# Patient Record
Sex: FEMALE | Race: WHITE | NOT HISPANIC OR LATINO | Employment: OTHER | URBAN - METROPOLITAN AREA
[De-identification: names, ages, dates, MRNs, and addresses within clinical notes are randomized per-mention and may not be internally consistent; named-entity substitution may affect disease eponyms.]

---

## 2018-01-09 ENCOUNTER — OFFICE VISIT (OUTPATIENT)
Dept: INTERNAL MEDICINE | Facility: CLINIC | Age: 58
End: 2018-01-09
Payer: COMMERCIAL

## 2018-01-09 VITALS
BODY MASS INDEX: 30.05 KG/M2 | TEMPERATURE: 98.4 F | HEART RATE: 99 BPM | OXYGEN SATURATION: 97 % | SYSTOLIC BLOOD PRESSURE: 90 MMHG | DIASTOLIC BLOOD PRESSURE: 60 MMHG | WEIGHT: 187 LBS | HEIGHT: 66 IN

## 2018-01-09 DIAGNOSIS — Z13.6 CARDIOVASCULAR SCREENING; LDL GOAL LESS THAN 130: ICD-10-CM

## 2018-01-09 DIAGNOSIS — H61.21 IMPACTED CERUMEN OF RIGHT EAR: ICD-10-CM

## 2018-01-09 DIAGNOSIS — Z12.31 ENCOUNTER FOR SCREENING MAMMOGRAM FOR BREAST CANCER: ICD-10-CM

## 2018-01-09 DIAGNOSIS — I10 ESSENTIAL HYPERTENSION, BENIGN: Primary | ICD-10-CM

## 2018-01-09 PROCEDURE — 99202 OFFICE O/P NEW SF 15 MIN: CPT | Mod: 25 | Performed by: NURSE PRACTITIONER

## 2018-01-09 PROCEDURE — 69209 REMOVE IMPACTED EAR WAX UNI: CPT | Mod: RT | Performed by: NURSE PRACTITIONER

## 2018-01-09 RX ORDER — LISINOPRIL AND HYDROCHLOROTHIAZIDE 20; 25 MG/1; MG/1
1 TABLET ORAL DAILY
COMMUNITY
End: 2018-01-19

## 2018-01-09 RX ORDER — HYDROCHLOROTHIAZIDE 25 MG/1
25 TABLET ORAL DAILY
Qty: 30 TABLET | Refills: 1 | Status: SHIPPED | OUTPATIENT
Start: 2018-01-09 | End: 2018-03-07

## 2018-01-09 NOTE — PATIENT INSTRUCTIONS
Hydrochlorothiazide 25 mg daily     Follow blood pressure - goal is less than 140/90    Mammogram   To schedule your mammogram, you may call the breast center at [phone].

## 2018-01-09 NOTE — NURSING NOTE
"Chief Complaint   Patient presents with     Establish Care     New pt ,pt wanting to review medications, needs refill on BP med       Initial BP 90/60 (BP Location: Left arm, Patient Position: Chair, Cuff Size: Adult Large)  Pulse 99  Temp 98.4  F (36.9  C) (Oral)  Ht 5' 6\" (1.676 m)  Wt 187 lb (84.8 kg)  SpO2 97%  BMI 30.18 kg/m2 Estimated body mass index is 30.18 kg/(m^2) as calculated from the following:    Height as of this encounter: 5' 6\" (1.676 m).    Weight as of this encounter: 187 lb (84.8 kg).  Medication Reconciliation: complete      "

## 2018-01-09 NOTE — MR AVS SNAPSHOT
After Visit Summary   1/9/2018    Lani Walker    MRN: [id number]           Patient Information     Date Of Birth          1960        Visit Information        Provider Department      1/9/2018 2:40 PM Francine Lombardi APRN CNP [facility] [address] [facility]        Today's Diagnoses     Essential hypertension, benign    -  1    Encounter for screening mammogram for breast cancer          Care Instructions    Hydrochlorothiazide 25 mg daily     Follow blood pressure - goal is less than 140/90    Mammogram   To schedule your mammogram, you may call the breast center at [phone].             Follow-ups after your visit        Your next 10 appointments already scheduled     Jan 16, 2018  8:20 AM CST   PHYSICAL with MEHREEN Puckett CNP   [facility] [address] ([facility])    [address]  [facility] [id number] [phone]              Future tests that were ordered for you today     Open Future Orders        Priority Expected Expires Ordered    *MA Screening Digital Bilateral Routine  1/9/2019 1/9/2018            Who to contact     If you have questions or need follow up information about today's clinic visit or your schedule please contact [facility] directly at [phone].  Normal or non-critical lab and imaging results will be communicated to you by MyChart, letter or phone within 4 business days after the clinic has received the results. If you do not hear from us within 7 days, please contact the clinic through MyChart or phone. If you have a critical or abnormal lab result, we will notify you by phone as soon as possible.  Submit refill requests through [employer] or call your pharmacy and they will forward the refill request to us. Please allow 3 business days for your refill to be completed.          Additional Information About Your Visit        [employer] Information     [employer] lets you send messages to your doctor, view  "your test results, renew your prescriptions, schedule appointments and more. To sign up, go to [email or web address] . Click on \"Log in\" on the left side of the screen, which will take you to the Welcome page. Then click on \"Sign up Now\" on the right side of the page.     You will be asked to enter the access code listed below, as well as some personal information. Please follow the directions to create your username and password.     Your access code is: [email or web address]  Expires: 2018  3:12 PM     Your access code will  in 90 days. If you need help or a new code, please call your [facility] clinic or [phone].        Care EveryWhere ID     This is your Care EveryWhere ID. This could be used by other organizations to access your [facility] medical records  [id number]        Your Vitals Were     Pulse Temperature Height Pulse Oximetry BMI (Body Mass Index)       99 98.4  F (36.9  C) (Oral) 5' 6\" (1.676 m) 97% 30.18 kg/m2        Blood Pressure from Last 3 Encounters:   18 90/60    Weight from Last 3 Encounters:   18 187 lb (84.8 kg)                 Today's Medication Changes          These changes are accurate as of: 18  3:12 PM.  If you have any questions, ask your nurse or doctor.               Start taking these medicines.        Dose/Directions    hydrochlorothiazide 25 MG tablet   Commonly known as:  HYDRODIURIL   Used for:  Essential hypertension, benign   Started by:  Francine Lombardi APRN CNP        Dose:  25 mg   Take 1 tablet (25 mg) by mouth daily   Quantity:  30 tablet   Refills:  1            Where to get your medicines      These medications were sent to [id number] [address] [address]     Phone:  [phone]     hydrochlorothiazide 25 MG tablet                Primary Care Provider    None Specified       No primary provider on file.        Equal Access to Services     [facility] SHAI AH: Haleyii elisha kraft " loren Aguilar, maryamda lubjadaha, qaybta kamaude sadler, charanjit alfonsoin hayaan azaliakairne ernestoamanda laleahadelaida familia. So [facility] [phone].    ATENCIÓN: Si habla español, tiene a biggs disposición servicios gratuitos de asistencia lingüística. Karine al [phone].    We comply with applicable federal civil rights laws and [place] laws. We do not discriminate on the basis of race, color, national origin, age, disability, sex, sexual orientation, or gender identity.            Thank you!     Thank you for choosing [facility]  for your care. Our goal is always to provide you with excellent care. Hearing back from our patients is one way we can continue to improve our services. Please take a few minutes to complete the written survey that you may receive in the mail after your visit with us. Thank you!             Your Updated Medication List - Protect others around you: Learn how to safely use, store and throw away your medicines at www.disposemymeds.org.          This list is accurate as of: 1/9/18  3:12 PM.  Always use your most recent med list.                   Brand Name Dispense Instructions for use Diagnosis    hydrochlorothiazide 25 MG tablet    HYDRODIURIL    30 tablet    Take 1 tablet (25 mg) by mouth daily    Essential hypertension, benign       lisinopril-hydrochlorothiazide 20-25 MG per tablet    PRINZIDE/ZESTORETIC     Take 1 tablet by mouth daily

## 2018-01-09 NOTE — PROGRESS NOTES
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  Additional history: as documented    Patient Active Problem List   Diagnosis     Essential hypertension, benign     Past Surgical History:   Procedure Laterality Date     BREAST BIOPSY, RT/LT  2016    left benign        Social History   Substance Use Topics     Smoking status: Current Every Day Smoker     Smokeless tobacco: Never Used      Comment: 10 cigs per day     Alcohol use Yes      Comment: once weekly 4-5 drinks      Family History   Problem Relation Age of Onset     Alzheimer Disease Mother      Heart Surgery Father      Alzheimer Disease Sister              Reviewed and updated as needed this visit by clinical staffTobacco  Allergies  Meds  Med Hx  Surg Hx  Fam Hx  Soc Hx      Reviewed and updated as needed this visit by Provider  Allergies         ROS:  Constitutional, HEENT, cardiovascular, pulmonary, GI, , musculoskeletal, neuro, skin, endocrine and psych systems are negative, except as otherwise noted.      OBJECTIVE:   BP 90/60 (BP Location: Left arm, Patient Position: Chair, Cuff Size: Adult Large)  Pulse 99  Temp 98.4  F (36.9  C) (Oral)  Ht 5' 6\" (1.676 m)  Wt 187 lb (84.8 kg)  SpO2 97%  BMI 30.18 kg/m2  Body mass index is 30.18 kg/(m^2).  GENERAL: healthy, alert and no distress  HENT: ear canals and TM's normal, " nose and mouth without ulcers or lesions  RESP: lungs clear to auscultation - no rales, rhonchi or wheezes  CV: regular rate and rhythm, normal S1 S2, no S3 or S4, no murmur, click or rub, no peripheral edema and peripheral pulses strong  MS: no gross musculoskeletal defects noted, no edema  NEURO: Normal strength and tone, mentation intact and speech normal  PSYCH: mentation appears normal, affect normal/bright    Diagnostic Test Results:  none     ASSESSMENT/PLAN:             1. Essential hypertension, benign  Too low - will decrease medication   Follow up next week for physical    - hydrochlorothiazide (HYDRODIURIL) 25 MG tablet; Take 1 tablet (25 mg) by mouth daily  Dispense: 30 tablet; Refill: 1    2. Encounter for screening mammogram for breast cancer  Benign biopsy 2016 left breast   - *MA Screening Digital Bilateral; Future    3. Impacted cerumen of right ear  Ear wash     4. CARDIOVASCULAR SCREENING; LDL GOAL LESS THAN 130  Physical next week       Patient Instructions   Hydrochlorothiazide 25 mg daily     Follow blood pressure - goal is less than 140/90    Mammogram   To schedule your mammogram, you may call the breast center at [phone].         MEHREEN Puckett [facility]

## 2018-01-16 ENCOUNTER — HOSPITAL ENCOUNTER (OUTPATIENT)
Dept: MAMMOGRAPHY | Facility: CLINIC | Age: 58
Discharge: HOME | End: 2018-01-16
Attending: NURSE PRACTITIONER | Admitting: NURSE PRACTITIONER
Payer: COMMERCIAL

## 2018-01-16 DIAGNOSIS — Z12.31 ENCOUNTER FOR SCREENING MAMMOGRAM FOR BREAST CANCER: ICD-10-CM

## 2018-01-16 PROCEDURE — 77067 SCR MAMMO BI INCL CAD: CPT

## 2018-01-19 ENCOUNTER — OFFICE VISIT (OUTPATIENT)
Dept: INTERNAL MEDICINE | Facility: CLINIC | Age: 58
End: 2018-01-19
Payer: COMMERCIAL

## 2018-01-19 VITALS
TEMPERATURE: 98.5 F | SYSTOLIC BLOOD PRESSURE: 108 MMHG | HEART RATE: 89 BPM | OXYGEN SATURATION: 97 % | DIASTOLIC BLOOD PRESSURE: 68 MMHG | HEIGHT: 66 IN | WEIGHT: 185 LBS | BODY MASS INDEX: 29.73 KG/M2

## 2018-01-19 DIAGNOSIS — Z00.01 ENCOUNTER FOR GENERAL ADULT MEDICAL EXAMINATION WITH ABNORMAL FINDINGS: Primary | ICD-10-CM

## 2018-01-19 DIAGNOSIS — Z11.59 NEED FOR HEPATITIS C SCREENING TEST: ICD-10-CM

## 2018-01-19 DIAGNOSIS — Z23 NEED FOR PROPHYLACTIC VACCINATION AND INOCULATION AGAINST INFLUENZA: ICD-10-CM

## 2018-01-19 DIAGNOSIS — I10 ESSENTIAL HYPERTENSION, BENIGN: ICD-10-CM

## 2018-01-19 DIAGNOSIS — Z71.6 ENCOUNTER FOR SMOKING CESSATION COUNSELING: ICD-10-CM

## 2018-01-19 DIAGNOSIS — Z72.0 TOBACCO ABUSE: ICD-10-CM

## 2018-01-19 DIAGNOSIS — Z13.6 CARDIOVASCULAR SCREENING; LDL GOAL LESS THAN 130: ICD-10-CM

## 2018-01-19 LAB
BASOPHILS # BLD AUTO: 0 10E9/L (ref 0–0.2)
BASOPHILS NFR BLD AUTO: 0.2 %
DIFFERENTIAL METHOD BLD: NORMAL
EOSINOPHIL # BLD AUTO: 0.1 10E9/L (ref 0–0.7)
EOSINOPHIL NFR BLD AUTO: 0.8 %
ERYTHROCYTE [DISTWIDTH] IN BLOOD BY AUTOMATED COUNT: 12.6 % (ref 10–15)
HCT VFR BLD AUTO: 38.2 % (ref 35–47)
HGB BLD-MCNC: 12.8 G/DL (ref 11.7–15.7)
LYMPHOCYTES # BLD AUTO: 2 10E9/L (ref 0.8–5.3)
LYMPHOCYTES NFR BLD AUTO: 33.7 %
MCH RBC QN AUTO: 32.4 PG (ref 26.5–33)
MCHC RBC AUTO-ENTMCNC: 33.5 G/DL (ref 31.5–36.5)
MCV RBC AUTO: 97 FL (ref 78–100)
MONOCYTES # BLD AUTO: 0.6 10E9/L (ref 0–1.3)
MONOCYTES NFR BLD AUTO: 9.6 %
NEUTROPHILS # BLD AUTO: 3.3 10E9/L (ref 1.6–8.3)
NEUTROPHILS NFR BLD AUTO: 55.7 %
PLATELET # BLD AUTO: 305 10E9/L (ref 150–450)
RBC # BLD AUTO: 3.95 10E12/L (ref 3.8–5.2)
WBC # BLD AUTO: 5.9 10E9/L (ref 4–11)

## 2018-01-19 PROCEDURE — 36415 COLL VENOUS BLD VENIPUNCTURE: CPT | Performed by: NURSE PRACTITIONER

## 2018-01-19 PROCEDURE — 99396 PREV VISIT EST AGE 40-64: CPT | Mod: 25 | Performed by: NURSE PRACTITIONER

## 2018-01-19 PROCEDURE — 90686 IIV4 VACC NO PRSV 0.5 ML IM: CPT | Performed by: NURSE PRACTITIONER

## 2018-01-19 PROCEDURE — 84439 ASSAY OF FREE THYROXINE: CPT | Performed by: NURSE PRACTITIONER

## 2018-01-19 PROCEDURE — 86803 HEPATITIS C AB TEST: CPT | Performed by: NURSE PRACTITIONER

## 2018-01-19 PROCEDURE — 80061 LIPID PANEL: CPT | Performed by: NURSE PRACTITIONER

## 2018-01-19 PROCEDURE — 90471 IMMUNIZATION ADMIN: CPT | Performed by: NURSE PRACTITIONER

## 2018-01-19 PROCEDURE — 80050 GENERAL HEALTH PANEL: CPT | Performed by: NURSE PRACTITIONER

## 2018-01-19 PROCEDURE — 82306 VITAMIN D 25 HYDROXY: CPT | Performed by: NURSE PRACTITIONER

## 2018-01-19 RX ORDER — BUPROPION HYDROCHLORIDE 150 MG/1
150 TABLET, EXTENDED RELEASE ORAL 2 TIMES DAILY
Qty: 180 TABLET | Refills: 1 | Status: SHIPPED | OUTPATIENT
Start: 2018-01-19 | End: 2018-07-17

## 2018-01-19 RX ORDER — ALBUTEROL SULFATE 90 UG/1
2 AEROSOL, METERED RESPIRATORY (INHALATION) EVERY 6 HOURS PRN
Qty: 1 INHALER | Refills: 0 | Status: SHIPPED | OUTPATIENT
Start: 2018-01-19 | End: 2018-02-16
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
## 2018-01-19 NOTE — PROGRESS NOTES

## 2018-01-19 NOTE — Clinical Note
.####################################  .Please abstract the following data from this visit with this patient into the appropriate field in Epic:  Pap smear done on this date: 6-16 (approximately), by this group: [facility], results were normal.

## 2018-01-19 NOTE — PATIENT INSTRUCTIONS
Lab in [address]    Continue hydrochlorothiazide daily     Wellbutrin 150 mg XR twice daily for smoking cessation     SAD light        Preventive Health Recommendations  Female Ages 50 - 64    Yearly exam: See your health care provider every year in order to  o Review health changes.   o Discuss preventive care.    o Review your medicines if your doctor has prescribed any.      Get a Pap test every three years (unless you have an abnormal result and your provider advises testing more often).    If you get Pap tests with HPV test, you only need to test every 5 years, unless you have an abnormal result.     You do not need a Pap test if your uterus was removed (hysterectomy) and you have not had cancer.    You should be tested each year for STDs (sexually transmitted diseases) if you're at risk.     Have a mammogram every 1 to 2 years.    Have a colonoscopy at age 50, or have a yearly FIT test (stool test). These exams screen for colon cancer.      Have a cholesterol test every 5 years, or more often if advised.    Have a diabetes test (fasting glucose) every three years. If you are at risk for diabetes, you should have this test more often.     If you are at risk for osteoporosis (brittle bone disease), think about having a bone density scan (DEXA).    Shots: Get a flu shot each year. Get a tetanus shot every 10 years.    Nutrition:     Eat at least 5 servings of fruits and vegetables each day.    Eat whole-grain bread, whole-wheat pasta and brown rice instead of white grains and rice.    Talk to your provider about Calcium and Vitamin D.     Lifestyle    Exercise at least 150 minutes a week (30 minutes a day, 5 days a week). This will help you control your weight and prevent disease.    Limit alcohol to one drink per day.    No smoking.     Wear sunscreen to prevent skin cancer.     See your dentist every six months for an exam and cleaning.    See your eye doctor every 1 to 2 years.

## 2018-01-19 NOTE — MR AVS SNAPSHOT
After Visit Summary   1/19/2018    Lani Walker    MRN: [id number]           Patient Information     Date Of Birth          1960        Visit Information        Provider Department      1/19/2018 8:00 AM Francine Lombardi APRN [facility]        Today's Diagnoses     Encounter for general adult medical examination with abnormal findings    -  1    Essential hypertension, benign        CARDIOVASCULAR SCREENING; LDL GOAL LESS THAN 130        Need for hepatitis C screening test        Encounter for smoking cessation counseling        Tobacco abuse          Care Instructions    Lab in [address]    Continue hydrochlorothiazide daily     Wellbutrin 150 mg XR twice daily for smoking cessation     SAD light        Preventive Health Recommendations  Female Ages 50 - 64    Yearly exam: See your health care provider every year in order to  o Review health changes.   o Discuss preventive care.    o Review your medicines if your doctor has prescribed any.      Get a Pap test every three years (unless you have an abnormal result and your provider advises testing more often).    If you get Pap tests with HPV test, you only need to test every 5 years, unless you have an abnormal result.     You do not need a Pap test if your uterus was removed (hysterectomy) and you have not had cancer.    You should be tested each year for STDs (sexually transmitted diseases) if you're at risk.     Have a mammogram every 1 to 2 years.    Have a colonoscopy at age 50, or have a yearly FIT test (stool test). These exams screen for colon cancer.      Have a cholesterol test every 5 years, or more often if advised.    Have a diabetes test (fasting glucose) every three years. If you are at risk for diabetes, you should have this test more often.     If you are at risk for osteoporosis (brittle bone disease), think about having a bone density scan (DEXA).    Shots: Get a flu shot each year. Get a tetanus  "shot every 10 years.    Nutrition:     Eat at least 5 servings of fruits and vegetables each day.    Eat whole-grain bread, whole-wheat pasta and brown rice instead of white grains and rice.    Talk to your provider about Calcium and Vitamin D.     Lifestyle    Exercise at least 150 minutes a week (30 minutes a day, 5 days a week). This will help you control your weight and prevent disease.    Limit alcohol to one drink per day.    No smoking.     Wear sunscreen to prevent skin cancer.     See your dentist every six months for an exam and cleaning.    See your eye doctor every 1 to 2 years.            Follow-ups after your visit        Who to contact     If you have questions or need follow up information about today's clinic visit or your schedule please contact [facility] directly at [phone].  Normal or non-critical lab and imaging results will be communicated to you by MyChart, letter or phone within 4 business days after the clinic has received the results. If you do not hear from us within 7 days, please contact the clinic through [employer] or phone. If you have a critical or abnormal lab result, we will notify you by phone as soon as possible.  Submit refill requests through [employer] or call your pharmacy and they will forward the refill request to us. Please allow 3 business days for your refill to be completed.          Additional Information About Your Visit        [employer] Information     [employer] lets you send messages to your doctor, view your test results, renew your prescriptions, schedule appointments and more. To sign up, go to [email or web address] . Click on \"Log in\" on the left side of the screen, which will take you to the Welcome page. Then click on \"Sign up Now\" on the right side of the page.     You will be asked to enter the access code listed below, as well as some personal information. Please follow the directions to create your username and password.     Your access " "code is: [email or web address]  Expires: 2018  3:12 PM     Your access code will  in 90 days. If you need help or a new code, please call your [facility] clinic or [phone].        Care EveryWhere ID     This is your Care EveryWhere ID. This could be used by other organizations to access your [facility] medical records  [id number]        Your Vitals Were     Pulse Temperature Height Pulse Oximetry BMI (Body Mass Index)       89 98.5  F (36.9  C) (Oral) 5' 6.25\" (1.683 m) 97% 29.63 kg/m2        Blood Pressure from Last 3 Encounters:   18 108/68   18 90/60    Weight from Last 3 Encounters:   18 185 lb (83.9 kg)   18 187 lb (84.8 kg)              We Performed the Following     CBC with platelets differential     Comprehensive metabolic panel     Hepatitis C Screen Reflex to HCV RNA Quant and Genotype     Lipid panel reflex to direct LDL Fasting     TSH with free T4 reflex     Vitamin D Deficiency          Today's Medication Changes          These changes are accurate as of: 18  8:57 AM.  If you have any questions, ask your nurse or doctor.               Start taking these medicines.        Dose/Directions    buPROPion 150 MG 12 hr tablet   Commonly known as:  WELLBUTRIN SR   Used for:  Encounter for smoking cessation counseling, Tobacco abuse   Started by:  Francine Lombardi APRN CNP        Dose:  150 mg   Take 1 tablet (150 mg) by mouth 2 times daily   Quantity:  180 tablet   Refills:  1            Where to get your medicines      These medications were sent to [id number] [address] [address]     Phone:  [phone]     buPROPion 150 MG 12 hr tablet                Primary Care Provider Office Phone # Fax #    MEHREEN Puckett [facility] [phone]       [address] [facility] [address]        Equal Access to Services     MACARIO GIBBONS AH: el Coker, " nilam malloriezacariasyaquelin keitamadisonpawannehemias alfonsoin hayaan adeeg kharash la'aan ah. So [facility] [phone].    ATENCIÓN: Si kamila blayne, tiene a biggs disposición servicios gratuitos de asistencia lingüística. Karine al [phone].    We comply with applicable federal civil rights laws and [place] laws. We do not discriminate on the basis of race, color, national origin, age, disability, sex, sexual orientation, or gender identity.            Thank you!     Thank you for choosing [facility]  for your care. Our goal is always to provide you with excellent care. Hearing back from our patients is one way we can continue to improve our services. Please take a few minutes to complete the written survey that you may receive in the mail after your visit with us. Thank you!             Your Updated Medication List - Protect others around you: Learn how to safely use, store and throw away your medicines at www.disposemymeds.org.          This list is accurate as of: 1/19/18  8:57 AM.  Always use your most recent med list.                   Brand Name Dispense Instructions for use Diagnosis    buPROPion 150 MG 12 hr tablet    WELLBUTRIN SR    180 tablet    Take 1 tablet (150 mg) by mouth 2 times daily    Encounter for smoking cessation counseling, Tobacco abuse       hydrochlorothiazide 25 MG tablet    HYDRODIURIL    30 tablet    Take 1 tablet (25 mg) by mouth daily    Essential hypertension, benign

## 2018-01-19 NOTE — NURSING NOTE
"Chief Complaint   Patient presents with     Physical     fasting,       Initial /68 (BP Location: Left arm, Patient Position: Sitting, Cuff Size: Adult Large)  Pulse 89  Temp 98.5  F (36.9  C) (Oral)  Ht 5' 6.25\" (1.683 m)  Wt 185 lb (83.9 kg)  SpO2 97%  BMI 29.63 kg/m2 Estimated body mass index is 29.63 kg/(m^2) as calculated from the following:    Height as of this encounter: 5' 6.25\" (1.683 m).    Weight as of this encounter: 185 lb (83.9 kg).  Medication Reconciliation: complete    "

## 2018-01-20 ENCOUNTER — HEALTH MAINTENANCE LETTER (OUTPATIENT)
Age: 58
End: 2018-01-20

## 2018-01-21 LAB
ALBUMIN SERPL-MCNC: 4.5 G/DL (ref 3.4–5)
ALP SERPL-CCNC: 56 U/L (ref 40–150)
ALT SERPL W P-5'-P-CCNC: 21 U/L (ref 0–50)
ANION GAP SERPL CALCULATED.3IONS-SCNC: 8 MMOL/L (ref 3–14)
AST SERPL W P-5'-P-CCNC: 16 U/L (ref 0–45)
BILIRUB SERPL-MCNC: 0.6 MG/DL (ref 0.2–1.3)
BUN SERPL-MCNC: 23 MG/DL (ref 7–30)
CALCIUM SERPL-MCNC: 9.5 MG/DL (ref 8.5–10.1)
CHLORIDE SERPL-SCNC: 103 MMOL/L (ref 94–109)
CHOLEST SERPL-MCNC: 296 MG/DL
CO2 SERPL-SCNC: 28 MMOL/L (ref 20–32)
CREAT SERPL-MCNC: 0.97 MG/DL (ref 0.52–1.04)
DEPRECATED CALCIDIOL+CALCIFEROL SERPL-MC: 31 UG/L (ref 20–75)
GFR SERPL CREATININE-BSD FRML MDRD: 59 ML/MIN/1.7M2
GLUCOSE SERPL-MCNC: 94 MG/DL (ref 70–99)
HCV AB SERPL QL IA: NONREACTIVE
HDLC SERPL-MCNC: 61 MG/DL
LDLC SERPL CALC-MCNC: 212 MG/DL
NONHDLC SERPL-MCNC: 235 MG/DL
POTASSIUM SERPL-SCNC: 4 MMOL/L (ref 3.4–5.3)
PROT SERPL-MCNC: 7.6 G/DL (ref 6.8–8.8)
SODIUM SERPL-SCNC: 139 MMOL/L (ref 133–144)
T4 FREE SERPL-MCNC: 1.01 NG/DL (ref 0.76–1.46)
TRIGL SERPL-MCNC: 114 MG/DL
TSH SERPL DL<=0.005 MIU/L-ACNC: 5.55 MU/L (ref 0.4–4)

## 2018-01-24 ENCOUNTER — TELEPHONE (OUTPATIENT)
Dept: INTERNAL MEDICINE | Facility: CLINIC | Age: 58
End: 2018-01-24

## 2018-01-24 DIAGNOSIS — E78.00 HYPERCHOLESTEREMIA: ICD-10-CM

## 2018-01-24 DIAGNOSIS — Z13.6 CARDIOVASCULAR SCREENING; LDL GOAL LESS THAN 130: ICD-10-CM

## 2018-01-24 DIAGNOSIS — R79.89 ELEVATED TSH: Primary | ICD-10-CM

## 2018-01-24 NOTE — LETTER
[facility] [place] [address] [place] [address]  [phone]    1/24/2018    Lani Walker  [address] [address]      Dear Lani    At [facility] we care about your health and well-being.  A review of your chart has indicated that you are due for a Mammogram, Colonoscopy.  Please contact us at [phone] so we can help facilitate these tests.   If you have already had one or all of the above screening tests at another facility, please call us to update your chart.        [facility] Internal Medicine  Healthcare Team

## 2018-01-24 NOTE — TELEPHONE ENCOUNTER
Panel Management Review      Patient has the following on her problem list:     Hypertension   Last three blood pressure readings:  BP Readings from Last 3 Encounters:   01/19/18 108/68   01/09/18 90/60     Blood pressure: Passed    HTN Guidelines:  Age 18-59 BP range:  Less than 140/90  Age 60-85 with Diabetes:  Less than 140/90  Age 60-85 without Diabetes:  less than 150/90      Composite cancer screening  Chart review shows that this patient is due/due soon for the following Pap Smear, Mammogram and Colonoscopy  Summary:    Patient is due/failing the following:   COLONOSCOPY, DAP, MAMMOGRAM, PAP and PHQ9    Action needed:   Reminder.   Pap sent to abstraction. 6/1/16 at [facility], normal.    Type of outreach:    Sent letter.    Questions for provider review:    None                                                                                                                                      RON Valerio     Chart routed to None .

## 2018-01-25 DIAGNOSIS — E78.00 HYPERCHOLESTEREMIA: Primary | ICD-10-CM

## 2018-01-25 DIAGNOSIS — Z13.6 CARDIOVASCULAR SCREENING; LDL GOAL LESS THAN 130: ICD-10-CM

## 2018-01-25 RX ORDER — ATORVASTATIN CALCIUM 20 MG/1
20 TABLET, FILM COATED ORAL DAILY
Qty: 90 TABLET | Refills: 1 | Status: SHIPPED | OUTPATIENT
Start: 2018-01-25 | End: 2019-09-13

## 2018-02-16 DIAGNOSIS — Z71.6 ENCOUNTER FOR SMOKING CESSATION COUNSELING: ICD-10-CM

## 2018-02-16 DIAGNOSIS — Z72.0 TOBACCO ABUSE: ICD-10-CM

## 2018-02-20 RX ORDER — ALBUTEROL SULFATE 90 UG/1
AEROSOL, METERED RESPIRATORY (INHALATION)
Qty: 18 G | Refills: 0 | Status: SHIPPED | OUTPATIENT
Start: 2018-02-20 | End: 2018-04-13

## 2018-02-20 NOTE — TELEPHONE ENCOUNTER
"Routing refill request to provider for review/approval because:  Drug not on the [facility] refill protocol for this dx    Requested Prescriptions   Pending Prescriptions Disp Refills     VENTOLIN  (90 BASE) MCG/ACT Inhaler [Pharmacy Med Name: Ventolin HFA Inhalation Aerosol Solution 108 (90 Base) MCG/ACT] 18 g 0     Sig: Inhale 2 puffs into the lungs every 6 hours as needed for shortness of breath / dyspnea or wheezing    Asthma Maintenance Inhalers - Anticholinergics Passed    2/16/2018  8:04 PM       Passed - Patient is age 12 years or older       Passed - Recent or future visit with authorizing provider's specialty    Patient had office visit in the last year or has a visit in the next 30 days with authorizing provider.  See \"Patient Info\" tab in inbasket, or \"Choose Columns\" in Meds & Orders section of the refill encounter.                     "

## 2018-02-21 DIAGNOSIS — Z71.6 ENCOUNTER FOR SMOKING CESSATION COUNSELING: ICD-10-CM

## 2018-02-21 DIAGNOSIS — Z72.0 TOBACCO ABUSE: ICD-10-CM

## 2018-02-23 RX ORDER — ALBUTEROL SULFATE 90 UG/1
AEROSOL, METERED RESPIRATORY (INHALATION)
Qty: 18 G | Refills: 0 | OUTPATIENT
Start: 2018-02-23

## 2018-03-07 DIAGNOSIS — I10 ESSENTIAL HYPERTENSION, BENIGN: ICD-10-CM

## 2018-03-10 RX ORDER — HYDROCHLOROTHIAZIDE 25 MG/1
TABLET ORAL
Qty: 30 TABLET | Refills: 9 | Status: SHIPPED | OUTPATIENT
Start: 2018-03-10 | End: 2019-01-25

## 2018-03-10 NOTE — TELEPHONE ENCOUNTER
"Requested Prescriptions   Pending Prescriptions Disp Refills     hydrochlorothiazide (HYDRODIURIL) 25 MG tablet [Pharmacy Med Name: HydroCHLOROthiazide Oral Tablet 25 MG] 30 tablet 0     Sig: Take 1 tablet (25 mg) by mouth daily    Diuretics (Including Combos) Protocol Passed    3/7/2018  7:02 AM       Passed - Blood pressure under 140/90 in past 12 months    BP Readings from Last 3 Encounters:   01/19/18 108/68   01/09/18 90/60                Passed - Recent (12 mo) or future (30 days) visit within the authorizing provider's specialty    Patient had office visit in the last 12 months or has a visit in the next 30 days with authorizing provider or within the authorizing provider's specialty.  See \"Patient Info\" tab in inbasket, or \"Choose Columns\" in Meds & Orders section of the refill encounter.           Passed - Patient is age 18 or older       Passed - No active pregancy on record       Passed - Normal serum creatinine on file in past 12 months    Recent Labs   Lab Test  01/19/18   0907   CR  0.97             Passed - Normal serum potassium on file in past 12 months    Recent Labs   Lab Test  01/19/18   0907   POTASSIUM  4.0                   Passed - Normal serum sodium on file in past 12 months    Recent Labs   Lab Test  01/19/18   0907   NA  139             Passed - No positive pregnancy test in past 12 months        Prescription approved per [facility] Refill Protocol.    "

## 2018-04-13 DIAGNOSIS — Z72.0 TOBACCO ABUSE: ICD-10-CM

## 2018-04-13 DIAGNOSIS — Z71.6 ENCOUNTER FOR SMOKING CESSATION COUNSELING: ICD-10-CM

## 2018-04-13 RX ORDER — ALBUTEROL SULFATE 90 UG/1
AEROSOL, METERED RESPIRATORY (INHALATION)
Qty: 18 G | Refills: 0 | Status: SHIPPED | OUTPATIENT
Start: 2018-04-13 | End: 2018-06-25

## 2018-06-25 DIAGNOSIS — Z71.6 ENCOUNTER FOR SMOKING CESSATION COUNSELING: ICD-10-CM

## 2018-06-25 DIAGNOSIS — Z72.0 TOBACCO ABUSE: ICD-10-CM

## 2018-06-27 NOTE — TELEPHONE ENCOUNTER
"Requested Prescriptions   Pending Prescriptions Disp Refills     VENTOLIN  (90 Base) MCG/ACT Inhaler [Pharmacy Med Name: Ventolin HFA Inhalation Aerosol Solution 108 (90 Base) MCG/ACT] 18 g 0    Last Written Prescription Date:  04/13/2018  Last Fill Quantity: 18 g,  # refills: 0   Last office visit: 1/19/2018 with prescribing provider:   Future Office Visit:   Sig: INHALE 2 PUFFS INTO THE LUNGS EVERY 6 HOURS AS NEEDED FOR SHORTNESS OF BREATH/DYSPNEA OR WHEEZING.    Asthma Maintenance Inhalers - Anticholinergics Passed    6/25/2018 11:31 AM       Passed - Patient is age 12 years or older       Passed - Recent (12 mo) or future (30 days) visit within the authorizing provider's specialty    Patient had office visit in the last 12 months or has a visit in the next 30 days with authorizing provider or within the authorizing provider's specialty.  See \"Patient Info\" tab in inbasket, or \"Choose Columns\" in Meds & Orders section of the refill encounter.            "

## 2018-06-28 RX ORDER — ALBUTEROL SULFATE 90 UG/1
AEROSOL, METERED RESPIRATORY (INHALATION)
Qty: 18 G | Refills: 1 | Status: SHIPPED | OUTPATIENT
Start: 2018-06-28 | End: 2018-12-30

## 2018-07-17 DIAGNOSIS — Z72.0 TOBACCO ABUSE: ICD-10-CM

## 2018-07-17 DIAGNOSIS — Z71.6 ENCOUNTER FOR SMOKING CESSATION COUNSELING: ICD-10-CM

## 2018-07-17 NOTE — TELEPHONE ENCOUNTER
"Requested Prescriptions   Pending Prescriptions Disp Refills     buPROPion (WELLBUTRIN SR) 150 MG 12 hr tablet  Last Written Prescription Date:  1/19/18  Last Fill Quantity: 180,  # refills: 1   Last office visit: 1/19/2018 with prescribing provider:  1/19/18   Future Office Visit:     180 tablet 1     Sig: Take 1 tablet (150 mg) by mouth 2 times daily    SSRIs Protocol Passed    7/17/2018 10:14 AM       Passed - Recent (12 mo) or future (30 days) visit within the authorizing provider's specialty    Patient had office visit in the last 12 months or has a visit in the next 30 days with authorizing provider or within the authorizing provider's specialty.  See \"Patient Info\" tab in inbasket, or \"Choose Columns\" in Meds & Orders section of the refill encounter.           Passed - Medication is Bupropion    If the medication is Bupropion (Wellbutrin), and the patient is taking for smoking cessation; OK to refill.         Passed - Patient is age 18 or older       Passed - No active pregnancy on record       Passed - No positive pregnancy test in last 12 months          "

## 2018-07-20 DIAGNOSIS — Z72.0 TOBACCO ABUSE: ICD-10-CM

## 2018-07-20 DIAGNOSIS — Z71.6 ENCOUNTER FOR SMOKING CESSATION COUNSELING: ICD-10-CM

## 2018-07-20 RX ORDER — BUPROPION HYDROCHLORIDE 150 MG/1
TABLET, EXTENDED RELEASE ORAL
Qty: 180 TABLET | Refills: 0 | OUTPATIENT
Start: 2018-07-20

## 2018-07-20 RX ORDER — BUPROPION HYDROCHLORIDE 150 MG/1
150 TABLET, EXTENDED RELEASE ORAL 2 TIMES DAILY
Qty: 180 TABLET | Refills: 1 | Status: SHIPPED | OUTPATIENT
Start: 2018-07-20 | End: 2019-01-23

## 2018-08-02 DIAGNOSIS — E78.00 HYPERCHOLESTEREMIA: ICD-10-CM

## 2018-08-02 DIAGNOSIS — Z13.6 CARDIOVASCULAR SCREENING; LDL GOAL LESS THAN 130: ICD-10-CM

## 2018-08-02 NOTE — TELEPHONE ENCOUNTER
"Requested Prescriptions   Pending Prescriptions Disp Refills     atorvastatin (LIPITOR) 20 MG tablet [Pharmacy Med Name: Atorvastatin Calcium Oral Tablet 20 MG] 90 tablet 0    Last Written Prescription Date:  01/25/2018  Last Fill Quantity: 90,  # refills: 1   Last office visit: 1/19/2018 with prescribing provider:     Future Office Visit:   Sig: Take 1 tablet (20 mg) by mouth daily    Statins Protocol Passed    8/2/2018  7:01 AM       Passed - LDL on file in past 12 months    Recent Labs   Lab Test  01/19/18   0907   LDL  212*            Passed - No abnormal creatine kinase in past 12 months    No lab results found.            Passed - Recent (12 mo) or future (30 days) visit within the authorizing provider's specialty    Patient had office visit in the last 12 months or has a visit in the next 30 days with authorizing provider or within the authorizing provider's specialty.  See \"Patient Info\" tab in inbasket, or \"Choose Columns\" in Meds & Orders section of the refill encounter.           Passed - Patient is age 18 or older       Passed - No active pregnancy on record       Passed - No positive pregnancy test in past 12 months        "

## 2018-08-04 NOTE — TELEPHONE ENCOUNTER
Patient was due for f/u labs in May 2018.    Need to call Patient on 8-6-18 to advise.    Then will refill medication x 1.

## 2018-08-09 RX ORDER — ATORVASTATIN CALCIUM 20 MG/1
TABLET, FILM COATED ORAL
Qty: 30 TABLET | Refills: 0 | OUTPATIENT
Start: 2018-08-09

## 2018-08-17 DIAGNOSIS — E78.00 HYPERCHOLESTEREMIA: ICD-10-CM

## 2018-08-17 DIAGNOSIS — R79.89 ELEVATED TSH: ICD-10-CM

## 2018-08-17 DIAGNOSIS — Z13.6 CARDIOVASCULAR SCREENING; LDL GOAL LESS THAN 130: ICD-10-CM

## 2018-08-17 LAB
ALBUMIN SERPL-MCNC: 4.3 G/DL (ref 3.4–5)
ALP SERPL-CCNC: 63 U/L (ref 40–150)
ALT SERPL W P-5'-P-CCNC: 26 U/L (ref 0–50)
ANION GAP SERPL CALCULATED.3IONS-SCNC: 9 MMOL/L (ref 3–14)
AST SERPL W P-5'-P-CCNC: 19 U/L (ref 0–45)
BILIRUB SERPL-MCNC: 0.9 MG/DL (ref 0.2–1.3)
BUN SERPL-MCNC: 17 MG/DL (ref 7–30)
CALCIUM SERPL-MCNC: 9.5 MG/DL (ref 8.5–10.1)
CHLORIDE SERPL-SCNC: 102 MMOL/L (ref 94–109)
CHOLEST SERPL-MCNC: 201 MG/DL
CO2 SERPL-SCNC: 28 MMOL/L (ref 20–32)
CREAT SERPL-MCNC: 0.96 MG/DL (ref 0.52–1.04)
GFR SERPL CREATININE-BSD FRML MDRD: 60 ML/MIN/1.7M2
GLUCOSE SERPL-MCNC: 101 MG/DL (ref 70–99)
HDLC SERPL-MCNC: 53 MG/DL
LDLC SERPL CALC-MCNC: 117 MG/DL
NONHDLC SERPL-MCNC: 148 MG/DL
POTASSIUM SERPL-SCNC: 3.9 MMOL/L (ref 3.4–5.3)
PROT SERPL-MCNC: 7.5 G/DL (ref 6.8–8.8)
SODIUM SERPL-SCNC: 139 MMOL/L (ref 133–144)
T4 FREE SERPL-MCNC: 1 NG/DL (ref 0.76–1.46)
TRIGL SERPL-MCNC: 153 MG/DL
TSH SERPL DL<=0.005 MIU/L-ACNC: 5.26 MU/L (ref 0.4–4)

## 2018-08-17 PROCEDURE — 36415 COLL VENOUS BLD VENIPUNCTURE: CPT | Performed by: NURSE PRACTITIONER

## 2018-08-17 PROCEDURE — 84439 ASSAY OF FREE THYROXINE: CPT | Performed by: NURSE PRACTITIONER

## 2018-08-17 PROCEDURE — 80061 LIPID PANEL: CPT | Performed by: NURSE PRACTITIONER

## 2018-08-17 PROCEDURE — 84443 ASSAY THYROID STIM HORMONE: CPT | Performed by: NURSE PRACTITIONER

## 2018-08-17 PROCEDURE — 80053 COMPREHEN METABOLIC PANEL: CPT | Performed by: NURSE PRACTITIONER

## 2018-12-30 DIAGNOSIS — Z72.0 TOBACCO ABUSE: ICD-10-CM

## 2018-12-30 DIAGNOSIS — Z71.6 ENCOUNTER FOR SMOKING CESSATION COUNSELING: ICD-10-CM

## 2018-12-31 NOTE — TELEPHONE ENCOUNTER
"Requested Prescriptions   Pending Prescriptions Disp Refills     VENTOLIN  (90 Base) MCG/ACT inhaler [Pharmacy Med Name: Ventolin HFA Inhalation Aerosol Solution 108 (90 Base) MCG/ACT] 18 g 0    Last Written Prescription Date:  06/28/2018  Last Fill Quantity: 18 g,  # refills: 1   Last office visit: 1/19/2018 with prescribing provider:     Future Office Visit:   Sig: INHALE 2 PUFFS INTO THE LUNGS EVERY 6 HOURS AS NEEDED FOR SHORTNESS OF BREATH/DYSPNEA OR WHEEZING.    Asthma Maintenance Inhalers - Anticholinergics Passed - 12/30/2018 10:39 AM       Passed - Patient is age 12 years or older       Passed - Recent (12 mo) or future (30 days) visit within the authorizing provider's specialty    Patient had office visit in the last 12 months or has a visit in the next 30 days with authorizing provider or within the authorizing provider's specialty.  See \"Patient Info\" tab in inbasket, or \"Choose Columns\" in Meds & Orders section of the refill encounter.              "

## 2019-01-02 ENCOUNTER — TELEPHONE (OUTPATIENT)
Dept: INTERNAL MEDICINE | Facility: CLINIC | Age: 59
End: 2019-01-02

## 2019-01-02 RX ORDER — ALBUTEROL SULFATE 90 UG/1
AEROSOL, METERED RESPIRATORY (INHALATION)
Qty: 18 G | Refills: 0 | Status: SHIPPED | OUTPATIENT
Start: 2019-01-02 | End: 2019-03-20

## 2019-01-02 NOTE — LETTER
[facility]  [address], [address]  [place] [address]  [phone]        January 2, 2019    Lani Walker  [address] [address]            Dear Ms. Lani Walker:    In order to ensure we are providing the best quality care, we have reviewed your chart and see that you are due for:     1. Pap smear  2.   3.     Please call the clinic at your earliest convenience to schedule an appointment.   Thank you for trusting us with your health care.             Sincerely,        PLACIDO Jeffrey

## 2019-01-02 NOTE — TELEPHONE ENCOUNTER
Routing refill request to provider for review/approval because:  Dx not on protocol - smoking cessation

## 2019-01-02 NOTE — TELEPHONE ENCOUNTER
Panel Management Review      Patient has the following on her problem list:     Hypertension   Last three blood pressure readings:  BP Readings from Last 3 Encounters:   01/19/18 108/68   01/09/18 90/60     Blood pressure: Passed    HTN Guidelines:  Age 18-59 BP range:  Less than 140/90  Age 60-85 with Diabetes:  Less than 140/90  Age 60-85 without Diabetes:  less than 150/90      Composite cancer screening  Chart review shows that this patient is due/due soon for the following Pap Smear  Summary:    Patient is due/failing the following:   PAP    Action needed:   Patient needs office visit for pap.    Type of outreach:    Sent letter.    Questions for provider review:    None                                                                                                                                      RON Valerio       Chart routed to none .

## 2019-01-25 DIAGNOSIS — I10 ESSENTIAL HYPERTENSION, BENIGN: ICD-10-CM

## 2019-01-25 NOTE — TELEPHONE ENCOUNTER
"Requested Prescriptions   Pending Prescriptions Disp Refills     hydrochlorothiazide (HYDRODIURIL) 25 MG tablet [Pharmacy Med Name: HydroCHLOROthiazide Oral Tablet 25 MG] 30 tablet 8    Last Written Prescription Date:  03/10/2018  Last Fill Quantity: 30,  # refills: 9   Last office visit: 1/19/2018 with prescribing provider:     Future Office Visit:   Sig: Take 1 tablet (25 mg) by mouth daily    Diuretics (Including Combos) Protocol Failed - 1/25/2019  7:01 AM       Failed - Blood pressure under 140/90 in past 12 months    BP Readings from Last 3 Encounters:   01/19/18 108/68   01/09/18 90/60                Failed - Recent (12 mo) or future (30 days) visit within the authorizing provider's specialty    Patient had office visit in the last 12 months or has a visit in the next 30 days with authorizing provider or within the authorizing provider's specialty.  See \"Patient Info\" tab in inbasket, or \"Choose Columns\" in Meds & Orders section of the refill encounter.             Passed - Medication is active on med list       Passed - Patient is age 18 or older       Passed - No active pregancy on record       Passed - Normal serum creatinine on file in past 12 months    Recent Labs   Lab Test 08/17/18  0846   CR 0.96             Passed - Normal serum potassium on file in past 12 months    Recent Labs   Lab Test 08/17/18  0846   POTASSIUM 3.9                   Passed - Normal serum sodium on file in past 12 months    Recent Labs   Lab Test 08/17/18  0846                Passed - No positive pregnancy test in past 12 months        "

## 2019-01-29 NOTE — TELEPHONE ENCOUNTER
Left message for patient to call back. Please assist with scheduling. Let RN know if refill needed before appointment.

## 2019-01-30 RX ORDER — HYDROCHLOROTHIAZIDE 25 MG/1
TABLET ORAL
Qty: 30 TABLET | Refills: 0 | Status: SHIPPED | OUTPATIENT
Start: 2019-01-30 | End: 2019-02-28

## 2019-01-30 NOTE — TELEPHONE ENCOUNTER
Prescription approved per [facility] Refill Protocol.    Next 5 appointments (look out 90 days)    Feb 21, 2019  8:20 AM CST  PHYSICAL with MEHREEN Puckett CNP  [facility] [address]  [facility] [address]  [phone]        Sharita Garcia RN

## 2019-01-30 NOTE — TELEPHONE ENCOUNTER
Pt called and scheduled for appt 2/21/19 physical and med check.      Please order her medication.  Pharmacy is [facility].    Semaj Kaminski  Pt Service Rep

## 2019-02-28 ENCOUNTER — HOSPITAL ENCOUNTER (OUTPATIENT)
Dept: MAMMOGRAPHY | Facility: CLINIC | Age: 59
Discharge: HOME | End: 2019-02-28
Attending: NURSE PRACTITIONER | Admitting: NURSE PRACTITIONER
Payer: COMMERCIAL

## 2019-02-28 ENCOUNTER — OFFICE VISIT (OUTPATIENT)
Dept: INTERNAL MEDICINE | Facility: CLINIC | Age: 59
End: 2019-02-28
Payer: COMMERCIAL

## 2019-02-28 VITALS
HEIGHT: 66 IN | WEIGHT: 185 LBS | SYSTOLIC BLOOD PRESSURE: 100 MMHG | OXYGEN SATURATION: 98 % | TEMPERATURE: 98.4 F | DIASTOLIC BLOOD PRESSURE: 66 MMHG | RESPIRATION RATE: 18 BRPM | BODY MASS INDEX: 29.73 KG/M2 | HEART RATE: 76 BPM

## 2019-02-28 DIAGNOSIS — Z00.01 ENCOUNTER FOR GENERAL ADULT MEDICAL EXAMINATION WITH ABNORMAL FINDINGS: Primary | ICD-10-CM

## 2019-02-28 DIAGNOSIS — Z71.6 ENCOUNTER FOR SMOKING CESSATION COUNSELING: ICD-10-CM

## 2019-02-28 DIAGNOSIS — F41.9 ANXIETY: ICD-10-CM

## 2019-02-28 DIAGNOSIS — Z72.0 TOBACCO ABUSE: ICD-10-CM

## 2019-02-28 DIAGNOSIS — I10 ESSENTIAL HYPERTENSION, BENIGN: ICD-10-CM

## 2019-02-28 DIAGNOSIS — Z12.31 VISIT FOR SCREENING MAMMOGRAM: ICD-10-CM

## 2019-02-28 DIAGNOSIS — F33.0 MILD EPISODE OF RECURRENT MAJOR DEPRESSIVE DISORDER (H): ICD-10-CM

## 2019-02-28 DIAGNOSIS — E78.00 HYPERCHOLESTEREMIA: ICD-10-CM

## 2019-02-28 LAB
BASOPHILS # BLD AUTO: 0 10E9/L (ref 0–0.2)
BASOPHILS NFR BLD AUTO: 0.2 %
DIFFERENTIAL METHOD BLD: NORMAL
EOSINOPHIL # BLD AUTO: 0 10E9/L (ref 0–0.7)
EOSINOPHIL NFR BLD AUTO: 0.5 %
ERYTHROCYTE [DISTWIDTH] IN BLOOD BY AUTOMATED COUNT: 12.6 % (ref 10–15)
HCT VFR BLD AUTO: 42.7 % (ref 35–47)
HGB BLD-MCNC: 14 G/DL (ref 11.7–15.7)
LYMPHOCYTES # BLD AUTO: 2.1 10E9/L (ref 0.8–5.3)
LYMPHOCYTES NFR BLD AUTO: 38.7 %
MCH RBC QN AUTO: 32 PG (ref 26.5–33)
MCHC RBC AUTO-ENTMCNC: 32.8 G/DL (ref 31.5–36.5)
MCV RBC AUTO: 98 FL (ref 78–100)
MONOCYTES # BLD AUTO: 0.4 10E9/L (ref 0–1.3)
MONOCYTES NFR BLD AUTO: 7.6 %
NEUTROPHILS # BLD AUTO: 2.9 10E9/L (ref 1.6–8.3)
NEUTROPHILS NFR BLD AUTO: 53 %
PLATELET # BLD AUTO: 294 10E9/L (ref 150–450)
RBC # BLD AUTO: 4.37 10E12/L (ref 3.8–5.2)
WBC # BLD AUTO: 5.5 10E9/L (ref 4–11)

## 2019-02-28 PROCEDURE — 82306 VITAMIN D 25 HYDROXY: CPT | Performed by: NURSE PRACTITIONER

## 2019-02-28 PROCEDURE — 84443 ASSAY THYROID STIM HORMONE: CPT | Performed by: NURSE PRACTITIONER

## 2019-02-28 PROCEDURE — 80061 LIPID PANEL: CPT | Performed by: NURSE PRACTITIONER

## 2019-02-28 PROCEDURE — 36415 COLL VENOUS BLD VENIPUNCTURE: CPT | Performed by: NURSE PRACTITIONER

## 2019-02-28 PROCEDURE — 84439 ASSAY OF FREE THYROXINE: CPT | Performed by: NURSE PRACTITIONER

## 2019-02-28 PROCEDURE — 99396 PREV VISIT EST AGE 40-64: CPT | Performed by: NURSE PRACTITIONER

## 2019-02-28 PROCEDURE — 77067 SCR MAMMO BI INCL CAD: CPT

## 2019-02-28 PROCEDURE — 80053 COMPREHEN METABOLIC PANEL: CPT | Performed by: NURSE PRACTITIONER

## 2019-02-28 PROCEDURE — 85025 COMPLETE CBC W/AUTO DIFF WBC: CPT | Performed by: NURSE PRACTITIONER

## 2019-02-28 RX ORDER — VARENICLINE TARTRATE 1 MG/1
1 TABLET, FILM COATED ORAL 2 TIMES DAILY
Qty: 180 TABLET | Refills: 1 | Status: SHIPPED | OUTPATIENT
Start: 2019-03-28 | End: 2021-11-18

## 2019-02-28 RX ORDER — HYDROCHLOROTHIAZIDE 25 MG/1
TABLET ORAL
Qty: 90 TABLET | Refills: 3 | Status: SHIPPED | OUTPATIENT
Start: 2019-02-28 | End: 2020-02-27

## 2019-02-28 ASSESSMENT — MIFFLIN-ST. JEOR: SCORE: 1444.87

## 2019-02-28 ASSESSMENT — PATIENT HEALTH QUESTIONNAIRE - PHQ9
10. IF YOU CHECKED OFF ANY PROBLEMS, HOW DIFFICULT HAVE THESE PROBLEMS MADE IT FOR YOU TO DO YOUR WORK, TAKE CARE OF THINGS AT HOME, OR GET ALONG WITH OTHER PEOPLE: NOT DIFFICULT AT ALL
SUM OF ALL RESPONSES TO PHQ QUESTIONS 1-9: 6
SUM OF ALL RESPONSES TO PHQ QUESTIONS 1-9: 6

## 2019-02-28 NOTE — PATIENT INSTRUCTIONS
Lab in [address]    Prozac 20 mg daily   May increase to 40 mg after 2 weeks if tolerated     Check in in a month and if doing well we can send in a 90 day supply    Chantix after you are stable on prozac - fluoxetine
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
## 2019-02-28 NOTE — NURSING NOTE
"Chief Complaint   Patient presents with     Physical     fasting does not want pap today     initial /66   Pulse 76   Temp 98.4  F (36.9  C) (Oral)   Resp 18   Ht 1.683 m (5' 6.25\")   Wt 83.9 kg (185 lb)   SpO2 98%   BMI 29.63 kg/m   Estimated body mass index is 29.63 kg/m  as calculated from the following:    Height as of this encounter: 1.683 m (5' 6.25\").    Weight as of this encounter: 83.9 kg (185 lb)..  bp completed using cuff size large  STIVEN MONTIEL LPN  "

## 2019-03-01 DIAGNOSIS — Z72.0 TOBACCO ABUSE: ICD-10-CM

## 2019-03-01 DIAGNOSIS — Z71.6 ENCOUNTER FOR SMOKING CESSATION COUNSELING: ICD-10-CM

## 2019-03-01 DIAGNOSIS — I10 ESSENTIAL HYPERTENSION, BENIGN: ICD-10-CM

## 2019-03-01 LAB
ALBUMIN SERPL-MCNC: 4.7 G/DL (ref 3.4–5)
ALP SERPL-CCNC: 58 U/L (ref 40–150)
ALT SERPL W P-5'-P-CCNC: 28 U/L (ref 0–50)
ANION GAP SERPL CALCULATED.3IONS-SCNC: 8 MMOL/L (ref 3–14)
AST SERPL W P-5'-P-CCNC: 19 U/L (ref 0–45)
BILIRUB SERPL-MCNC: 0.7 MG/DL (ref 0.2–1.3)
BUN SERPL-MCNC: 20 MG/DL (ref 7–30)
CALCIUM SERPL-MCNC: 10.1 MG/DL (ref 8.5–10.1)
CHLORIDE SERPL-SCNC: 104 MMOL/L (ref 94–109)
CHOLEST SERPL-MCNC: 285 MG/DL
CO2 SERPL-SCNC: 26 MMOL/L (ref 20–32)
CREAT SERPL-MCNC: 0.88 MG/DL (ref 0.52–1.04)
DEPRECATED CALCIDIOL+CALCIFEROL SERPL-MC: 31 UG/L (ref 20–75)
GFR SERPL CREATININE-BSD FRML MDRD: 72 ML/MIN/{1.73_M2}
GLUCOSE SERPL-MCNC: 104 MG/DL (ref 70–99)
HDLC SERPL-MCNC: 48 MG/DL
LDLC SERPL CALC-MCNC: 203 MG/DL
NONHDLC SERPL-MCNC: 237 MG/DL
POTASSIUM SERPL-SCNC: 4.1 MMOL/L (ref 3.4–5.3)
PROT SERPL-MCNC: 8.1 G/DL (ref 6.8–8.8)
SODIUM SERPL-SCNC: 138 MMOL/L (ref 133–144)
T4 FREE SERPL-MCNC: 0.96 NG/DL (ref 0.76–1.46)
TRIGL SERPL-MCNC: 169 MG/DL
TSH SERPL DL<=0.005 MIU/L-ACNC: 5.95 MU/L (ref 0.4–4)

## 2019-03-01 ASSESSMENT — PATIENT HEALTH QUESTIONNAIRE - PHQ9: SUM OF ALL RESPONSES TO PHQ QUESTIONS 1-9: 6

## 2019-03-01 NOTE — TELEPHONE ENCOUNTER
"Requested Prescriptions   Pending Prescriptions Disp Refills     buPROPion (WELLBUTRIN SR) 150 MG 12 hr tablet [Pharmacy Med Name: buPROPion HCl ER (SR) Oral Tablet Extended Release 12 Hour   DISCONTINUED 2/28/19.  Last Written Prescription Date:  1/24/19  Last Fill Quantity: 60 TABLET,  # refills: 0   Last office visit: 2/28/2019 with prescribing provider:  DIONISIO Atwood Office Visit:     60 tablet 0     Sig: Take 1 tablet (150 mg) by mouth 2 times daily. Due for office visit.    SSRIs Protocol Failed - 3/1/2019  7:02 AM  PHQ-9 SCORE 2/28/2019   PHQ-9 Total Score MyChart 6 (Mild depression)   PHQ-9 Total Score 6     No flowsheet data found.             Failed - Medication is active on med list       Passed - Recent (12 mo) or future (30 days) visit within the authorizing provider's specialty    Patient had office visit in the last 12 months or has a visit in the next 30 days with authorizing provider or within the authorizing provider's specialty.  See \"Patient Info\" tab in inbasket, or \"Choose Columns\" in Meds & Orders section of the refill encounter.             Passed - Medication is Bupropion    If the medication is Bupropion (Wellbutrin), and the patient is taking for smoking cessation; OK to refill.         Passed - Patient is age 18 or older       Passed - No active pregnancy on record       Passed - No positive pregnancy test in last 12 months        hydrochlorothiazide (HYDRODIURIL) 25 MG tablet [Pharmacy Med Name: hydroCHLOROthiazide Oral Tablet 25 MG]  Last Written Prescription Date:  2/28/19  Last Fill Quantity: 90 TABLET,  # refills: 3   Last office visit: 2/28/2019 with prescribing provider:  DIONISIO Atwood Office Visit:     30 tablet 0     Sig: Take 1 tablet (25 mg) by mouth daily    Diuretics (Including Combos) Protocol Passed - 3/1/2019  7:02 AM       Passed - Blood pressure under 140/90 in past 12 months    BP Readings from Last 3 Encounters:   02/28/19 100/66   01/19/18 108/68   01/09/18 " "90/60                Passed - Recent (12 mo) or future (30 days) visit within the authorizing provider's specialty    Patient had office visit in the last 12 months or has a visit in the next 30 days with authorizing provider or within the authorizing provider's specialty.  See \"Patient Info\" tab in inbasket, or \"Choose Columns\" in Meds & Orders section of the refill encounter.             Passed - Medication is active on med list       Passed - Patient is age 18 or older       Passed - No active pregancy on record       Passed - Normal serum creatinine on file in past 12 months    Recent Labs   Lab Test 02/28/19  0954   CR 0.88             Passed - Normal serum potassium on file in past 12 months    Recent Labs   Lab Test 02/28/19  0954   POTASSIUM 4.1                   Passed - Normal serum sodium on file in past 12 months    Recent Labs   Lab Test 02/28/19  0954                Passed - No positive pregnancy test in past 12 months          "

## 2019-03-05 RX ORDER — HYDROCHLOROTHIAZIDE 25 MG/1
TABLET ORAL
Qty: 30 TABLET | Refills: 0 | OUTPATIENT
Start: 2019-03-05

## 2019-03-05 RX ORDER — BUPROPION HYDROCHLORIDE 150 MG/1
TABLET, EXTENDED RELEASE ORAL
Qty: 60 TABLET | Refills: 0 | OUTPATIENT
Start: 2019-03-05

## 2019-03-05 NOTE — TELEPHONE ENCOUNTER
Wellbutrin   Per office visit note on 2/28/19:  Tobacco abuse  Wants to stop smoking   wellbutrin did not help   - varenicline (CHANTIX MARILYN) 0.5 MG X 11 & 1 MG X 42 tablet; Take 0.5 mg tab daily for 3 days, THEN 0.5 mg tab twice daily for 4 days, THEN 1 mg twice daily.  Dispense: 53 tablet; Refill: 0  - varenicline (CHANTIX) 1 MG tablet; Take 1 tablet (1 mg) by mouth 2 times daily  Dispense: 180 tablet; Refill: 1    Medication refused, due to the stated above.     Hydrochlorothiazide   Medication refilled 2/28/19 for 3 months with additional refills. Medication refused, too soon for refill.

## 2019-03-08 DIAGNOSIS — E78.00 HYPERCHOLESTEREMIA: Primary | ICD-10-CM

## 2019-03-08 RX ORDER — ATORVASTATIN CALCIUM 20 MG/1
20 TABLET, FILM COATED ORAL DAILY
Qty: 90 TABLET | Refills: 3 | Status: SHIPPED | OUTPATIENT
Start: 2019-03-08 | End: 2019-09-13

## 2019-03-20 DIAGNOSIS — Z72.0 TOBACCO ABUSE: ICD-10-CM

## 2019-03-20 DIAGNOSIS — Z71.6 ENCOUNTER FOR SMOKING CESSATION COUNSELING: ICD-10-CM

## 2019-03-21 RX ORDER — ALBUTEROL SULFATE 90 UG/1
AEROSOL, METERED RESPIRATORY (INHALATION)
Qty: 18 G | Refills: 0 | Status: SHIPPED | OUTPATIENT
Start: 2019-03-21 | End: 2019-04-29

## 2019-03-21 NOTE — TELEPHONE ENCOUNTER
Routing refill request to provider for review/approval because:    No hx of asthma.  Prescribed for smoking cessation.    Sharita Garcia RN

## 2019-03-21 NOTE — TELEPHONE ENCOUNTER
"Requested Prescriptions   Pending Prescriptions Disp Refills     VENTOLIN  (90 Base) MCG/ACT inhaler [Pharmacy Med Name: Ventolin HFA Inhalation Aerosol Solution 108 (90 Base) MCG/ACT] 18 g 0    Last Written Prescription Date:  01/02/2019  Last Fill Quantity: 18 g,  # refills: 0   Last office visit: 2/28/2019 with prescribing provider:     Future Office Visit:   Sig: INHALE 2 PUFFS INTO THE LUNGS EVERY 6 HOURS AS NEEDED FOR SHORTNESS OF BREATH/DYSPNEA OR WHEEZING.    Asthma Maintenance Inhalers - Anticholinergics Passed - 3/20/2019 10:01 PM       Passed - Patient is age 12 years or older       Passed - Recent (12 mo) or future (30 days) visit within the authorizing provider's specialty    Patient had office visit in the last 12 months or has a visit in the next 30 days with authorizing provider or within the authorizing provider's specialty.  See \"Patient Info\" tab in inbasket, or \"Choose Columns\" in Meds & Orders section of the refill encounter.             Passed - Medication is active on med list        "

## 2019-04-29 DIAGNOSIS — Z71.6 ENCOUNTER FOR SMOKING CESSATION COUNSELING: ICD-10-CM

## 2019-04-29 DIAGNOSIS — Z72.0 TOBACCO ABUSE: ICD-10-CM

## 2019-04-30 NOTE — TELEPHONE ENCOUNTER
Routing refill request to provider for review/approval because:  Drug not on the [facility] refill protocol for associated diagnosis.

## 2019-04-30 NOTE — TELEPHONE ENCOUNTER
"Requested Prescriptions   Pending Prescriptions Disp Refills     VENTOLIN  (90 Base) MCG/ACT inhaler [Pharmacy Med Name: Ventolin  Last Written Prescription Date:  3/21/2019  Last Fill Quantity: 18g,  # refills: 0   Last office visit: 2/28/2019 with prescribing provider:     Future Office Visit:   HFA Inhalation Aerosol Solution 108 (90 Base) MCG/ACT] 18 g 0     Sig: INHALE 2 PUFFS INTO THE LUNGS EVERY 6 HOURS AS NEEDED FOR SHORTNESS OF BREATH/DYSPNEA OR WHEEZING.       Asthma Maintenance Inhalers - Anticholinergics Passed - 4/29/2019  6:31 PM        Passed - Patient is age 12 years or older        Passed - Recent (12 mo) or future (30 days) visit within the authorizing provider's specialty     Patient had office visit in the last 12 months or has a visit in the next 30 days with authorizing provider or within the authorizing provider's specialty.  See \"Patient Info\" tab in inbasket, or \"Choose Columns\" in Meds & Orders section of the refill encounter.              Passed - Medication is active on med list        "

## 2019-05-01 RX ORDER — ALBUTEROL SULFATE 90 UG/1
AEROSOL, METERED RESPIRATORY (INHALATION)
Qty: 18 G | Refills: 0 | Status: SHIPPED | OUTPATIENT
Start: 2019-05-01 | End: 2019-10-04

## 2019-06-02 DIAGNOSIS — F41.9 ANXIETY: ICD-10-CM

## 2019-06-02 DIAGNOSIS — F33.0 MILD EPISODE OF RECURRENT MAJOR DEPRESSIVE DISORDER (H): ICD-10-CM

## 2019-06-03 NOTE — TELEPHONE ENCOUNTER
"Requested Prescriptions   Pending Prescriptions Disp Refills     FLUoxetine (PROZAC) 20 MG capsule [Pharmacy Med Name: FLUOXETINE 20MG CAPSULES] 60 capsule 0     Sig: TAKE 1 CAPSULE(20 MG) BY MOUTH DAILY. MAY INCREASE TO 2 CAPSULES(40 MG) AFTER 2 WEEKS IF TOLERATED AND NEEDED   Last Written Prescription Date:  02/28/2019  Last Fill Quantity: 60,  # refills: 1   Last office visit: 2/28/2019 with prescribing provider:     Future Office Visit:      SSRIs Protocol Failed - 6/2/2019  3:17 AM        Failed - PHQ-9 score less than 5 in past 6 months     Please review last PHQ-9 score.           Passed - Medication is active on med list        Passed - Patient is age 18 or older        Passed - No active pregnancy on record        Passed - No positive pregnancy test in last 12 months        Passed - Recent (6 mo) or future (30 days) visit within the authorizing provider's specialty     Patient had office visit in the last 6 months or has a visit in the next 30 days with authorizing provider or within the authorizing provider's specialty.  See \"Patient Info\" tab in inbasket, or \"Choose Columns\" in Meds & Orders section of the refill encounter.            "

## 2019-06-03 NOTE — TELEPHONE ENCOUNTER
Routing refill request to provider for review/approval because:  PHQ-9 greater than 5    PHQ-9 score:    PHQ-9 SCORE 2/28/2019   PHQ-9 Total Score MyChart 6 (Mild depression)   PHQ-9 Total Score 6

## 2019-06-19 ENCOUNTER — TELEPHONE (OUTPATIENT)
Dept: INTERNAL MEDICINE | Facility: CLINIC | Age: 59
End: 2019-06-19

## 2019-06-19 NOTE — TELEPHONE ENCOUNTER
Panel Management Review      Patient has the following on her problem list:     Depression / Dysthymia review    Measure:  Needs PHQ-9 score of 4 or less during index window.  Administer PHQ-9 and if score is 5 or more, send encounter to provider for next steps.    5 - 7 month window range:     PHQ-9 SCORE 2/28/2019   PHQ-9 Total Score MyChart 6 (Mild depression)   PHQ-9 Total Score 6       If PHQ-9 recheck is 5 or more, route to provider for next steps.    Patient is due for:  DAP    Hypertension   Last three blood pressure readings:  BP Readings from Last 3 Encounters:   02/28/19 100/66   01/19/18 108/68   01/09/18 90/60     Blood pressure: Passed    HTN Guidelines:  Less than 140/90      Composite cancer screening  Chart review shows that this patient is due/due soon for the following Pap Smear and Colonoscopy  Summary:    Patient is due/failing the following:   COLONOSCOPY, DAP and PAP    Action needed:   Patient needs office visit for see above.    Type of outreach:    Phone, spoke to patient.  pt [facility] in June to have pap done    Questions for provider review:    None                                                                                                                                    .STIVEN MONTIEL LPN       Chart routed to none .

## 2019-08-02 DIAGNOSIS — F41.9 ANXIETY: ICD-10-CM

## 2019-08-02 DIAGNOSIS — F33.0 MILD EPISODE OF RECURRENT MAJOR DEPRESSIVE DISORDER (H): ICD-10-CM

## 2019-08-02 NOTE — TELEPHONE ENCOUNTER
PHQ-9 score:    PHQ-9 SCORE 2/28/2019   PHQ-9 Total Score MyChart 6 (Mild depression)   PHQ-9 Total Score 6       Routing refill request to provider for review/approval because:  Most recent phq9 ouside standing order parameters.    Please advise, thanks.

## 2019-08-02 NOTE — TELEPHONE ENCOUNTER
"Requested Prescriptions   Pending Prescriptions Disp Refills     FLUoxetine (PROZAC) 20 MG capsule [Pharmacy Med Name: FLUOXETINE 20MG CAPSULES] 60 capsule 0     Sig: TAKE 1 CAPSULE(20 MG) BY MOUTH DAILY. MAY INCREASE TO 2 CAPSULES 40 MG AFTER 2 WEEKS IF TOLERATED AND NEEDED   Last Written Prescription Date:  06/03/2019  Last Fill Quantity: 60,  # refills: 0   Last office visit: 2/28/2019 with prescribing provider:     Future Office Visit:      SSRIs Protocol Failed - 8/2/2019  7:01 AM        Failed - PHQ-9 score less than 5 in past 6 months     Please review last PHQ-9 score.           Passed - Medication is active on med list        Passed - Patient is age 18 or older        Passed - No active pregnancy on record        Passed - No positive pregnancy test in last 12 months        Passed - Recent (6 mo) or future (30 days) visit within the authorizing provider's specialty     Patient had office visit in the last 6 months or has a visit in the next 30 days with authorizing provider or within the authorizing provider's specialty.  See \"Patient Info\" tab in inbasket, or \"Choose Columns\" in Meds & Orders section of the refill encounter.            "

## 2019-08-02 NOTE — TELEPHONE ENCOUNTER
Spoke with patient.  States she is taking 1 capsule daily.    States she just picked up refill this morning and has enough to last for a while.    She scheduled a f/u appointment in August 2019 for her depression and needs a pap smear.    Next 5 appointments (look out 90 days)    Aug 27, 2019  7:00 AM CDT  Office Visit with MEHREEN Puckett CNP  [facility] [address]  [facility] [address] [phone]          Medication refused with reason: refill not needed.

## 2019-09-13 ENCOUNTER — OFFICE VISIT (OUTPATIENT)
Dept: INTERNAL MEDICINE | Facility: CLINIC | Age: 59
End: 2019-09-13
Payer: COMMERCIAL

## 2019-09-13 VITALS
WEIGHT: 185 LBS | BODY MASS INDEX: 29.73 KG/M2 | DIASTOLIC BLOOD PRESSURE: 68 MMHG | HEART RATE: 78 BPM | SYSTOLIC BLOOD PRESSURE: 112 MMHG | TEMPERATURE: 98 F | OXYGEN SATURATION: 97 % | HEIGHT: 66 IN | RESPIRATION RATE: 18 BRPM

## 2019-09-13 DIAGNOSIS — E78.00 HYPERCHOLESTEREMIA: ICD-10-CM

## 2019-09-13 DIAGNOSIS — Z13.6 CARDIOVASCULAR SCREENING; LDL GOAL LESS THAN 130: ICD-10-CM

## 2019-09-13 DIAGNOSIS — Z23 NEED FOR PROPHYLACTIC VACCINATION AND INOCULATION AGAINST INFLUENZA: ICD-10-CM

## 2019-09-13 DIAGNOSIS — F41.9 ANXIETY: ICD-10-CM

## 2019-09-13 DIAGNOSIS — Z11.51 SCREENING FOR HUMAN PAPILLOMAVIRUS: ICD-10-CM

## 2019-09-13 DIAGNOSIS — Z00.00 LABORATORY EXAMINATION ORDERED AS PART OF A ROUTINE GENERAL MEDICAL EXAMINATION: Primary | ICD-10-CM

## 2019-09-13 DIAGNOSIS — F33.0 MILD EPISODE OF RECURRENT MAJOR DEPRESSIVE DISORDER (H): ICD-10-CM

## 2019-09-13 PROCEDURE — 90471 IMMUNIZATION ADMIN: CPT | Performed by: NURSE PRACTITIONER

## 2019-09-13 PROCEDURE — 90682 RIV4 VACC RECOMBINANT DNA IM: CPT | Performed by: NURSE PRACTITIONER

## 2019-09-13 PROCEDURE — 99214 OFFICE O/P EST MOD 30 MIN: CPT | Mod: 25 | Performed by: NURSE PRACTITIONER

## 2019-09-13 PROCEDURE — G0123 SCREEN CERV/VAG THIN LAYER: HCPCS | Performed by: NURSE PRACTITIONER

## 2019-09-13 PROCEDURE — 87624 HPV HI-RISK TYP POOLED RSLT: CPT | Performed by: NURSE PRACTITIONER

## 2019-09-13 RX ORDER — ATORVASTATIN CALCIUM 20 MG/1
20 TABLET, FILM COATED ORAL DAILY
Qty: 90 TABLET | Refills: 3 | Status: SHIPPED | OUTPATIENT
Start: 2019-09-13 | End: 2020-09-29 | Stop reason: DRUGHIGH

## 2019-09-13 ASSESSMENT — PATIENT HEALTH QUESTIONNAIRE - PHQ9
SUM OF ALL RESPONSES TO PHQ QUESTIONS 1-9: 5
SUM OF ALL RESPONSES TO PHQ QUESTIONS 1-9: 5
10. IF YOU CHECKED OFF ANY PROBLEMS, HOW DIFFICULT HAVE THESE PROBLEMS MADE IT FOR YOU TO DO YOUR WORK, TAKE CARE OF THINGS AT HOME, OR GET ALONG WITH OTHER PEOPLE: SOMEWHAT DIFFICULT

## 2019-09-13 ASSESSMENT — MIFFLIN-ST. JEOR: SCORE: 1439.87

## 2019-09-13 NOTE — NURSING NOTE
"Chief Complaint   Patient presents with     Recheck Medication     also needs pap and discuss chantix     initial /68   Pulse 78   Temp 98  F (36.7  C) (Oral)   Resp 18   Ht 1.683 m (5' 6.25\")   Wt 83.9 kg (185 lb)   SpO2 97%   BMI 29.63 kg/m   Estimated body mass index is 29.63 kg/m  as calculated from the following:    Height as of this encounter: 1.683 m (5' 6.25\").    Weight as of this encounter: 83.9 kg (185 lb)..  bp completed using cuff size large  STIVEN MONTIEL LPN  "

## 2019-09-13 NOTE — PROGRESS NOTES
"Answers for HPI/ROS submitted by the patient on 9/13/2019   If you checked off any problems, how difficult have these problems made it for you to do your work, take care of things at home, or get along with other people?: Somewhat difficult  PHQ9 TOTAL SCORE: 5  .Subjective     Lani Walker is a 59 year old female who presents to clinic today for the following health issues:    HPI   Chief Complaint   Patient presents with     Recheck Medication     also needs pap and discuss chantix    Want to up depression medication to twice daily- winter is harder than rest of year - feels she would benefit with increase     She lost her job- they closed [employer] - she is getting paid for 5 weeks     Need to start chantix for smoking cessation                 Reviewed and updated as needed this visit by Provider  Tobacco  Allergies  Meds  Problems  Med Hx  Surg Hx  Fam Hx         Review of Systems   ROS COMP: Constitutional, HEENT, cardiovascular, pulmonary, GI, , musculoskeletal, neuro, skin, endocrine and psych systems are negative, except as otherwise noted.      Objective    /68   Pulse 78   Temp 98  F (36.7  C) (Oral)   Resp 18   Ht 1.683 m (5' 6.25\")   Wt 83.9 kg (185 lb)   SpO2 97%   BMI 29.63 kg/m    Body mass index is 29.63 kg/m .  Physical Exam   GENERAL: alert and no distress  RESP: lungs clear to auscultation - no rales, rhonchi or wheezes  CV: regular rate and rhythm  ABDOMEN: soft, nontender,  and bowel sounds normal   (female): normal female external genitalia, normal urethral meatus, vaginal mucosa, normal cervix/adnexa/uterus without masses or discharge  MS: no gross musculoskeletal defects noted, no edema  NEURO: Normal strength and tone, mentation intact and speech normal  PSYCH: mentation appears normal, affect normal/bright    Diagnostic Test Results:  Labs reviewed in Epic  Lab future         Assessment & Plan     1. Mild episode of recurrent major depressive disorder " "(H)  Want to increase dose of medication   - FLUoxetine (PROZAC) 20 MG capsule; Take 1 capsule (20 mg) by mouth 2 times daily  Dispense: 180 capsule; Refill: 3    2. Anxiety  As above   - FLUoxetine (PROZAC) 20 MG capsule; Take 1 capsule (20 mg) by mouth 2 times daily  Dispense: 180 capsule; Refill: 3    3. Laboratory examination ordered as part of a routine general medical examination  Future lab   - Lipid panel reflex to direct LDL Fasting; Future  - Comprehensive metabolic panel; Future  - TSH with free T4 reflex; Future  - CBC with platelets and differential; Future    4. Hypercholesteremia  Tolerating medication   - atorvastatin (LIPITOR) 20 MG tablet; Take 1 tablet (20 mg) by mouth daily  Dispense: 90 tablet; Refill: 3    5. CARDIOVASCULAR SCREENING; LDL GOAL LESS THAN 130    - atorvastatin (LIPITOR) 20 MG tablet; Take 1 tablet (20 mg) by mouth daily  Dispense: 90 tablet; Refill: 3    6. Screening for human papillomavirus    - Pap imaged thin layer screen with HPV - recommended age 30 - 65 years (select HPV order below)  - HPV High Risk Types DNA Cervical    7. Need for prophylactic vaccination and inoculation against influenza    - INFLUENZA QUAD, RECOMBINANT, P-FREE (RIV4) (FLUBLOCK) [68030]  - Vaccine Administration, Initial [67262]     Tobacco Cessation:   reports that she has been smoking.  She has never used smokeless tobacco.        BMI:   Estimated body mass index is 29.63 kg/m  as calculated from the following:    Height as of this encounter: 1.683 m (5' 6.25\").    Weight as of this encounter: 83.9 kg (185 lb).           Patient Instructions   Prozac 20 mg twice daily     Fasting lab future               Return in about 6 months (around 3/13/2020).    MEHREEN Puckett [facility]        "

## 2019-09-13 NOTE — LETTER
September 23, 2019    Lanijaun Walker  [address] [facility] [address]    Dear ,  This letter is regarding your recent Pap smear (cervical cancer screening) and Human Papillomavirus (HPV) test.  We are happy to inform you that your Pap smear result is normal. Cervical cancer is closely linked with certain types of HPV. Your results showed no evidence of high-risk HPV.  We recommend you have your next PAP smear and HPV test in 5 years.  You will still need to return to the clinic every year for an annual exam and other preventive tests.  If you have additional questions regarding this result, please call our registered nurse, Charity at [phone].  Sincerely,    MEHREEN Puckett CNP /esh

## 2019-09-14 ASSESSMENT — PATIENT HEALTH QUESTIONNAIRE - PHQ9: SUM OF ALL RESPONSES TO PHQ QUESTIONS 1-9: 5

## 2019-09-18 LAB
COPATH REPORT: NORMAL
FINAL DIAGNOSIS: NORMAL
HPV HR 12 DNA CVX QL NAA+PROBE: NEGATIVE
HPV16 DNA SPEC QL NAA+PROBE: NEGATIVE
HPV18 DNA SPEC QL NAA+PROBE: NEGATIVE
PAP: NORMAL
SPECIMEN DESCRIPTION: NORMAL
SPECIMEN SOURCE CVX/VAG CYTO: NORMAL

## 2019-10-04 DIAGNOSIS — Z71.6 ENCOUNTER FOR SMOKING CESSATION COUNSELING: ICD-10-CM

## 2019-10-04 DIAGNOSIS — Z72.0 TOBACCO ABUSE: ICD-10-CM

## 2019-10-04 NOTE — TELEPHONE ENCOUNTER
"Requested Prescriptions   Pending Prescriptions Disp Refills     VENTOLIN  (90 Base) MCG/ACT inhaler [Pharmacy Med Name: Ventolin HFA Inhalation Aerosol Solution 108 (90 Base) MCG/ACT] 18 g 0     Sig: INHALE 2 PUFFS INTO THE LUNGS EVERY 6 HOURS AS NEEDED FOR SHORTNESS OF BREATH/DYSPNEA OR WHEEZING.   Last Written Prescription Date:  05/01/2019  Last Fill Quantity: 18 g,  # refills: 0   Last office visit: 9/13/2019 with prescribing provider:     Future Office Visit:      Asthma Maintenance Inhalers - Anticholinergics Passed - 10/4/2019  2:44 PM        Passed - Patient is age 12 years or older        Passed - Recent (12 mo) or future (30 days) visit within the authorizing provider's specialty     Patient has had an office visit with the authorizing provider or a provider within the authorizing providers department within the previous 12 mos or has a future within next 30 days. See \"Patient Info\" tab in inbasket, or \"Choose Columns\" in Meds & Orders section of the refill encounter.              Passed - Medication is active on med list        "

## 2019-10-08 NOTE — TELEPHONE ENCOUNTER
Routing refill request to provider for review/approval because:  Medication not on protocol for associated diagnosis

## 2019-10-09 RX ORDER — ALBUTEROL SULFATE 90 UG/1
AEROSOL, METERED RESPIRATORY (INHALATION)
Qty: 18 G | Refills: 0 | Status: SHIPPED | OUTPATIENT
Start: 2019-10-09 | End: 2020-01-13

## 2020-01-09 DIAGNOSIS — Z71.6 ENCOUNTER FOR SMOKING CESSATION COUNSELING: ICD-10-CM

## 2020-01-09 DIAGNOSIS — Z72.0 TOBACCO ABUSE: ICD-10-CM

## 2020-01-09 NOTE — TELEPHONE ENCOUNTER
"Requested Prescriptions   Pending Prescriptions Disp Refills     VENTOLIN  (90 Base) MCG/ACT inhaler [Pharmacy Med Name: Ventolin HFA Inhalation Aerosol Solution 108 (90 Base) MCG/ACT] 18 g 0     Sig: INHALE 2 PUFFS INTO THE LUNGS EVERY 6 HOURS AS NEEDED FOR SHORTNESS OF BREATH/DYSPNEA OR WHEEZING.       Asthma Maintenance Inhalers - Anticholinergics Passed - 1/9/2020  1:03 PM        Passed - Patient is age 12 years or older        Passed - Recent (12 mo) or future (30 days) visit within the authorizing provider's specialty     Patient has had an office visit with the authorizing provider or a provider within the authorizing providers department within the previous 12 mos or has a future within next 30 days. See \"Patient Info\" tab in inbasket, or \"Choose Columns\" in Meds & Orders section of the refill encounter.              Passed - Medication is active on med list        Last Written Prescription Date:  10/9/19  Last Fill Quantity: 18 G,  # refills: 0   Last office visit: 9/13/2019 with prescribing provider:  9/13/19   Future Office Visit:      "

## 2020-01-09 NOTE — LETTER
[facility]  [address], [address]  [place], [place]  [address]                                            TEL:[phone]  FAX:[phone]        Lani Walker  [address] [place] [id number]          January 13, 2020    Dear Lani,   APPOINTMENT REMINDER:   Our records indicate that it is time for you to be seen for a follow-up office visit and annual physical.     Taking care of your health is important to us, and ongoing visits with your provider are vital to your care.    We look forward to seeing you in the near future.  You may call our office at [phone] to schedule this appointment.    Please disregard this notice if you have already made an appointment.      Sincerely,      [facility]

## 2020-01-13 RX ORDER — ALBUTEROL SULFATE 90 UG/1
AEROSOL, METERED RESPIRATORY (INHALATION)
Qty: 18 G | Refills: 0 | Status: SHIPPED | OUTPATIENT
Start: 2020-01-13 | End: 2020-04-03

## 2020-01-13 NOTE — TELEPHONE ENCOUNTER
Medication is being filled for 1 time refill only due to:  Patient needs to be seen because will be due for f/u/annual physical.    Letter mailed to patient reminding to schedule.

## 2020-02-25 DIAGNOSIS — I10 ESSENTIAL HYPERTENSION, BENIGN: ICD-10-CM

## 2020-02-27 RX ORDER — HYDROCHLOROTHIAZIDE 25 MG/1
TABLET ORAL
Qty: 90 TABLET | Refills: 0 | Status: SHIPPED | OUTPATIENT
Start: 2020-02-27 | End: 2020-05-21

## 2020-04-02 DIAGNOSIS — Z71.6 ENCOUNTER FOR SMOKING CESSATION COUNSELING: ICD-10-CM

## 2020-04-02 DIAGNOSIS — Z72.0 TOBACCO ABUSE: ICD-10-CM

## 2020-04-02 NOTE — TELEPHONE ENCOUNTER
"Requested Prescriptions   Pending Prescriptions Disp Refills     VENTOLIN  (90 Base) MCG/ACT inhaler [Pharmacy Med Name: Ventolin HFA Inhalation Aerosol Solution 108 (90 Base) MCG/ACT] 18 g 0     Sig: INHALE 2 PUFFS INTO THE LUNGS EVERY 6 HOURS AS NEEDED FOR SHORTNESS OF BREATH/DYSPNEA OR WHEEZING.   Last Written Prescription Date:  01/13/2020  Last Fill Quantity: 18 g,  # refills: 0   Last office visit: 9/13/2019 with prescribing provider:     Future Office Visit:      Asthma Maintenance Inhalers - Anticholinergics Passed - 4/2/2020  2:39 PM        Passed - Patient is age 12 years or older        Passed - Recent (12 mo) or future (30 days) visit within the authorizing provider's specialty     Patient has had an office visit with the authorizing provider or a provider within the authorizing providers department within the previous 12 mos or has a future within next 30 days. See \"Patient Info\" tab in inbasket, or \"Choose Columns\" in Meds & Orders section of the refill encounter.              Passed - Medication is active on med list       Short-Acting Beta Agonist Inhalers Protocol  Passed - 4/2/2020  2:39 PM        Passed - Patient is age 12 or older        Passed - Recent (12 mo) or future (30 days) visit within the authorizing provider's specialty     Patient has had an office visit with the authorizing provider or a provider within the authorizing providers department within the previous 12 mos or has a future within next 30 days. See \"Patient Info\" tab in inbasket, or \"Choose Columns\" in Meds & Orders section of the refill encounter.              Passed - Medication is active on med list           "

## 2020-04-03 RX ORDER — ALBUTEROL SULFATE 90 UG/1
AEROSOL, METERED RESPIRATORY (INHALATION)
Qty: 18 G | Refills: 0 | Status: SHIPPED | OUTPATIENT
Start: 2020-04-03 | End: 2020-07-07

## 2020-04-03 NOTE — TELEPHONE ENCOUNTER
"Requested Prescriptions   Pending Prescriptions Disp Refills     VENTOLIN  (90 Base) MCG/ACT inhaler [Pharmacy Med Name: Ventolin HFA Inhalation Aerosol Solution 108 (90 Base) MCG/ACT] 18 g 0     Sig: INHALE 2 PUFFS INTO THE LUNGS EVERY 6 HOURS AS NEEDED FOR SHORTNESS OF BREATH/DYSPNEA OR WHEEZING.       Asthma Maintenance Inhalers - Anticholinergics Passed - 4/2/2020  3:21 PM        Passed - Patient is age 12 years or older        Passed - Recent (12 mo) or future (30 days) visit within the authorizing provider's specialty     Patient has had an office visit with the authorizing provider or a provider within the authorizing providers department within the previous 12 mos or has a future within next 30 days. See \"Patient Info\" tab in inbasket, or \"Choose Columns\" in Meds & Orders section of the refill encounter.              Passed - Medication is active on med list       Short-Acting Beta Agonist Inhalers Protocol  Passed - 4/2/2020  3:21 PM        Passed - Patient is age 12 or older        Passed - Recent (12 mo) or future (30 days) visit within the authorizing provider's specialty     Patient has had an office visit with the authorizing provider or a provider within the authorizing providers department within the previous 12 mos or has a future within next 30 days. See \"Patient Info\" tab in inbasket, or \"Choose Columns\" in Meds & Orders section of the refill encounter.              Passed - Medication is active on med list           Rx approved per [facility] protocol.      Tania Smith RN BSN, Pap Tracking    "

## 2020-05-20 DIAGNOSIS — I10 ESSENTIAL HYPERTENSION, BENIGN: ICD-10-CM

## 2020-05-21 RX ORDER — HYDROCHLOROTHIAZIDE 25 MG/1
TABLET ORAL
Qty: 90 TABLET | Refills: 0 | Status: SHIPPED | OUTPATIENT
Start: 2020-05-21 | End: 2020-07-30

## 2020-05-21 NOTE — TELEPHONE ENCOUNTER
Routing refill request to provider for review/approval because:  Labs not current:  Scr, Na, K  Does have an appt with you in 2 months

## 2020-06-08 ENCOUNTER — HOSPITAL ENCOUNTER (OUTPATIENT)
Dept: MAMMOGRAPHY | Facility: CLINIC | Age: 60
Discharge: HOME | End: 2020-06-08
Attending: NURSE PRACTITIONER | Admitting: NURSE PRACTITIONER
Payer: COMMERCIAL

## 2020-06-08 DIAGNOSIS — Z12.31 VISIT FOR SCREENING MAMMOGRAM: ICD-10-CM

## 2020-06-08 PROCEDURE — 77067 SCR MAMMO BI INCL CAD: CPT

## 2020-06-09 ENCOUNTER — HOSPITAL ENCOUNTER (OUTPATIENT)
Dept: ULTRASOUND IMAGING | Facility: CLINIC | Age: 60
End: 2020-06-09
Attending: NURSE PRACTITIONER
Payer: COMMERCIAL

## 2020-06-09 ENCOUNTER — HOSPITAL ENCOUNTER (OUTPATIENT)
Dept: MAMMOGRAPHY | Facility: CLINIC | Age: 60
End: 2020-06-09
Attending: NURSE PRACTITIONER
Payer: COMMERCIAL

## 2020-06-09 DIAGNOSIS — R92.8 ABNORMAL MAMMOGRAM: ICD-10-CM

## 2020-06-09 PROCEDURE — 76642 ULTRASOUND BREAST LIMITED: CPT | Mod: LT

## 2020-06-09 PROCEDURE — G0279 TOMOSYNTHESIS, MAMMO: HCPCS

## 2020-07-29 DIAGNOSIS — I10 ESSENTIAL HYPERTENSION, BENIGN: ICD-10-CM

## 2020-07-29 DIAGNOSIS — E78.00 HYPERCHOLESTEREMIA: Primary | ICD-10-CM

## 2020-07-30 RX ORDER — HYDROCHLOROTHIAZIDE 25 MG/1
TABLET ORAL
Qty: 30 TABLET | Refills: 0 | Status: SHIPPED | OUTPATIENT
Start: 2020-07-30 | End: 2020-07-31

## 2020-07-30 NOTE — TELEPHONE ENCOUNTER
Pending Prescriptions:                       Disp   Refills    hydrochlorothiazide (HYDRODIURIL) 25 MG ta*90 tab*0        Sig: TAKE 1 TABLET(25 MG) BY MOUTH DAILY    Routing refill request to provider for review/approval because:  Patient fails protocol

## 2020-07-30 NOTE — TELEPHONE ENCOUNTER
Patient has 3 pills left. She left some at her mothers home. Please send in 1 refill. Patient will be out before her appt

## 2020-07-31 RX ORDER — HYDROCHLOROTHIAZIDE 25 MG/1
25 TABLET ORAL DAILY
Qty: 30 TABLET | Refills: 1 | Status: SHIPPED | OUTPATIENT
Start: 2020-07-31 | End: 2020-09-08

## 2020-07-31 NOTE — TELEPHONE ENCOUNTER
Spoke with patient.  She has a lab appointment and an office visit scheduled in September 2020.  Cannot come in earlier as she will be busy.    Medication refilled to last until appointment 9-8-20.

## 2020-09-14 DIAGNOSIS — Z71.6 ENCOUNTER FOR SMOKING CESSATION COUNSELING: ICD-10-CM

## 2020-09-14 DIAGNOSIS — Z72.0 TOBACCO ABUSE: ICD-10-CM

## 2020-09-14 RX ORDER — ALBUTEROL SULFATE 90 UG/1
AEROSOL, METERED RESPIRATORY (INHALATION)
Qty: 18 G | Refills: 0 | Status: SHIPPED | OUTPATIENT
Start: 2020-09-14 | End: 2021-01-29

## 2020-09-18 ENCOUNTER — TELEPHONE (OUTPATIENT)
Dept: INTERNAL MEDICINE | Facility: CLINIC | Age: 60
End: 2020-09-18

## 2020-09-18 NOTE — TELEPHONE ENCOUNTER
Prior Authorization Retail Medication Request    Medication/Dose: VENTOLIN  (90 Base) MCG/ACT inhaler   ICD code (if different than what is on RX):    Tobacco abuse [Z72.0]        Encounter for smoking cessation counseling [Z71.6]           Previously Tried and Failed:    Rationale:      Insurance Name:    Insurance ID:      covermymeds Key [id number]    Pharmacy Information (if different than what is on RX)  Name:    Phone:

## 2020-09-18 NOTE — TELEPHONE ENCOUNTER
Prior Authorization Not Needed per Insurance    Medication: VENTOLIN  (90 Base) MCG/ACT inhaler   Insurance Company: [employer] - Phone [phone] Fax [phone]  Expected CoPay:      Pharmacy Filling the Rx: [id number] - [place], [place] - [address] [place] [address]  Pharmacy Notified: Yes  Patient Notified: Yes    Verified with pharmacy that medication went through insurance. No PA is needed.

## 2020-09-28 DIAGNOSIS — E78.00 HYPERCHOLESTEREMIA: ICD-10-CM

## 2020-09-28 DIAGNOSIS — I10 ESSENTIAL HYPERTENSION, BENIGN: ICD-10-CM

## 2020-09-28 LAB
BASOPHILS # BLD AUTO: 0 10E9/L (ref 0–0.2)
BASOPHILS NFR BLD AUTO: 0.2 %
DIFFERENTIAL METHOD BLD: NORMAL
EOSINOPHIL # BLD AUTO: 0.1 10E9/L (ref 0–0.7)
EOSINOPHIL NFR BLD AUTO: 0.6 %
ERYTHROCYTE [DISTWIDTH] IN BLOOD BY AUTOMATED COUNT: 12.7 % (ref 10–15)
HCT VFR BLD AUTO: 41.4 % (ref 35–47)
HGB BLD-MCNC: 13.9 G/DL (ref 11.7–15.7)
LYMPHOCYTES # BLD AUTO: 2 10E9/L (ref 0.8–5.3)
LYMPHOCYTES NFR BLD AUTO: 22.8 %
MCH RBC QN AUTO: 32.4 PG (ref 26.5–33)
MCHC RBC AUTO-ENTMCNC: 33.6 G/DL (ref 31.5–36.5)
MCV RBC AUTO: 97 FL (ref 78–100)
MONOCYTES # BLD AUTO: 0.7 10E9/L (ref 0–1.3)
MONOCYTES NFR BLD AUTO: 8.6 %
NEUTROPHILS # BLD AUTO: 5.9 10E9/L (ref 1.6–8.3)
NEUTROPHILS NFR BLD AUTO: 67.8 %
PLATELET # BLD AUTO: 291 10E9/L (ref 150–450)
RBC # BLD AUTO: 4.29 10E12/L (ref 3.8–5.2)
WBC # BLD AUTO: 8.6 10E9/L (ref 4–11)

## 2020-09-28 PROCEDURE — 80061 LIPID PANEL: CPT | Performed by: NURSE PRACTITIONER

## 2020-09-28 PROCEDURE — 84439 ASSAY OF FREE THYROXINE: CPT | Performed by: NURSE PRACTITIONER

## 2020-09-28 PROCEDURE — 87389 HIV-1 AG W/HIV-1&-2 AB AG IA: CPT | Performed by: NURSE PRACTITIONER

## 2020-09-28 PROCEDURE — 36415 COLL VENOUS BLD VENIPUNCTURE: CPT | Performed by: NURSE PRACTITIONER

## 2020-09-28 PROCEDURE — 80050 GENERAL HEALTH PANEL: CPT | Performed by: NURSE PRACTITIONER

## 2020-09-29 DIAGNOSIS — E78.00 HYPERCHOLESTEREMIA: Primary | ICD-10-CM

## 2020-09-29 LAB
ALBUMIN SERPL-MCNC: 3.9 G/DL (ref 3.4–5)
ALP SERPL-CCNC: 65 U/L (ref 40–150)
ALT SERPL W P-5'-P-CCNC: 29 U/L (ref 0–50)
ANION GAP SERPL CALCULATED.3IONS-SCNC: 11 MMOL/L (ref 3–14)
AST SERPL W P-5'-P-CCNC: 15 U/L (ref 0–45)
BILIRUB SERPL-MCNC: 0.9 MG/DL (ref 0.2–1.3)
BUN SERPL-MCNC: 16 MG/DL (ref 7–30)
CALCIUM SERPL-MCNC: 9.4 MG/DL (ref 8.5–10.1)
CHLORIDE SERPL-SCNC: 104 MMOL/L (ref 94–109)
CHOLEST SERPL-MCNC: 241 MG/DL
CO2 SERPL-SCNC: 21 MMOL/L (ref 20–32)
CREAT SERPL-MCNC: 0.97 MG/DL (ref 0.52–1.04)
GFR SERPL CREATININE-BSD FRML MDRD: 64 ML/MIN/{1.73_M2}
GLUCOSE SERPL-MCNC: 105 MG/DL (ref 70–99)
HDLC SERPL-MCNC: 44 MG/DL
HIV 1+2 AB+HIV1 P24 AG SERPL QL IA: NONREACTIVE
LDLC SERPL CALC-MCNC: 154 MG/DL
NONHDLC SERPL-MCNC: 197 MG/DL
POTASSIUM SERPL-SCNC: 3.9 MMOL/L (ref 3.4–5.3)
PROT SERPL-MCNC: 7.7 G/DL (ref 6.8–8.8)
SODIUM SERPL-SCNC: 136 MMOL/L (ref 133–144)
T4 FREE SERPL-MCNC: 0.99 NG/DL (ref 0.76–1.46)
TRIGL SERPL-MCNC: 216 MG/DL
TSH SERPL DL<=0.005 MIU/L-ACNC: 5.41 MU/L (ref 0.4–4)

## 2020-09-29 RX ORDER — ATORVASTATIN CALCIUM 40 MG/1
40 TABLET, FILM COATED ORAL DAILY
Qty: 90 TABLET | Refills: 3 | Status: SHIPPED | OUTPATIENT
Start: 2020-09-29 | End: 2021-10-01

## 2020-10-06 ENCOUNTER — OFFICE VISIT (OUTPATIENT)
Dept: INTERNAL MEDICINE | Facility: CLINIC | Age: 60
End: 2020-10-06
Payer: COMMERCIAL

## 2020-10-06 VITALS
HEART RATE: 71 BPM | HEIGHT: 66 IN | RESPIRATION RATE: 20 BRPM | SYSTOLIC BLOOD PRESSURE: 106 MMHG | BODY MASS INDEX: 30.86 KG/M2 | WEIGHT: 192 LBS | OXYGEN SATURATION: 100 % | TEMPERATURE: 98.3 F | DIASTOLIC BLOOD PRESSURE: 68 MMHG

## 2020-10-06 DIAGNOSIS — Z00.01 ENCOUNTER FOR GENERAL ADULT MEDICAL EXAMINATION WITH ABNORMAL FINDINGS: Primary | ICD-10-CM

## 2020-10-06 DIAGNOSIS — L85.8 KERATOSIS PILARIS: ICD-10-CM

## 2020-10-06 DIAGNOSIS — R63.5 WEIGHT GAIN: ICD-10-CM

## 2020-10-06 DIAGNOSIS — I10 ESSENTIAL HYPERTENSION, BENIGN: ICD-10-CM

## 2020-10-06 DIAGNOSIS — F33.0 MILD EPISODE OF RECURRENT MAJOR DEPRESSIVE DISORDER (H): ICD-10-CM

## 2020-10-06 DIAGNOSIS — F41.9 ANXIETY: ICD-10-CM

## 2020-10-06 DIAGNOSIS — E78.00 HYPERCHOLESTEREMIA: ICD-10-CM

## 2020-10-06 PROCEDURE — 99396 PREV VISIT EST AGE 40-64: CPT | Performed by: NURSE PRACTITIONER

## 2020-10-06 RX ORDER — METFORMIN HCL 500 MG
500 TABLET, EXTENDED RELEASE 24 HR ORAL
Qty: 90 TABLET | Refills: 1 | Status: SHIPPED | OUTPATIENT
Start: 2020-10-06 | End: 2021-03-31

## 2020-10-06 RX ORDER — TOPIRAMATE 50 MG/1
25 TABLET, FILM COATED ORAL 2 TIMES DAILY
Qty: 180 TABLET | Refills: 1 | Status: SHIPPED | OUTPATIENT
Start: 2020-10-06 | End: 2021-11-18

## 2020-10-06 RX ORDER — HYDROCHLOROTHIAZIDE 25 MG/1
25 TABLET ORAL DAILY
Qty: 90 TABLET | Refills: 3 | Status: SHIPPED | OUTPATIENT
Start: 2020-10-06 | End: 2021-09-27

## 2020-10-06 ASSESSMENT — ENCOUNTER SYMPTOMS
DIZZINESS: 0
HEADACHES: 0
JOINT SWELLING: 0
BREAST MASS: 0
SHORTNESS OF BREATH: 0
COUGH: 0
PALPITATIONS: 0
DIARRHEA: 0
FEVER: 0
HEARTBURN: 0
HEMATOCHEZIA: 0
ABDOMINAL PAIN: 0
HEMATURIA: 0
PARESTHESIAS: 1
NAUSEA: 0
DYSURIA: 0
ARTHRALGIAS: 0
FREQUENCY: 0
NERVOUS/ANXIOUS: 1
WEAKNESS: 0
EYE PAIN: 0
SORE THROAT: 0
MYALGIAS: 0
CONSTIPATION: 0
CHILLS: 0

## 2020-10-06 ASSESSMENT — PATIENT HEALTH QUESTIONNAIRE - PHQ9
SUM OF ALL RESPONSES TO PHQ QUESTIONS 1-9: 3
SUM OF ALL RESPONSES TO PHQ QUESTIONS 1-9: 3
10. IF YOU CHECKED OFF ANY PROBLEMS, HOW DIFFICULT HAVE THESE PROBLEMS MADE IT FOR YOU TO DO YOUR WORK, TAKE CARE OF THINGS AT HOME, OR GET ALONG WITH OTHER PEOPLE: NOT DIFFICULT AT ALL

## 2020-10-06 ASSESSMENT — MIFFLIN-ST. JEOR: SCORE: 1466.63

## 2020-10-06 NOTE — NURSING NOTE
"Chief Complaint   Patient presents with     Physical     had labs done last week     initial /68   Pulse 71   Temp 98.3  F (36.8  C) (Oral)   Resp 20   Ht 1.683 m (5' 6.25\")   Wt 87.1 kg (192 lb)   SpO2 100%   BMI 30.76 kg/m   Estimated body mass index is 30.76 kg/m  as calculated from the following:    Height as of this encounter: 1.683 m (5' 6.25\").    Weight as of this encounter: 87.1 kg (192 lb)..  bp completed using cuff size large  STIVEN MONTIEL LPN  "

## 2020-10-06 NOTE — PATIENT INSTRUCTIONS
Amlactin cream to arms daily   This may sting at first     Metformin xr once daily     Topamax 1/2 tab for 2 weeks and increase to 1 tab twice daily     Need to do a blood pressure and weight recheck in a month  -Nurse only

## 2020-10-06 NOTE — PROGRESS NOTES
SUBJECTIVE:   CC: Lani Walker is an 60 year old woman who presents for preventive health visit.       Patient has been advised of split billing requirements and indicates understanding:   Healthy Habits:     Getting at least 3 servings of Calcium per day:  Yes    Bi-annual eye exam:  NO    Dental care twice a year:  Yes    Sleep apnea or symptoms of sleep apnea:  None    Diet:  Carbohydrate counting    Frequency of exercise:  4-5 days/week    Duration of exercise:  15-30 minutes    Taking medications regularly:  Yes    Medication side effects:  None    PHQ-2 Total Score: 4    Additional concerns today:  No              Today's PHQ-2 Score:   PHQ-2 ( 1999 [employer]) 10/6/2020   Q1: Little interest or pleasure in doing things 2   Q2: Feeling down, depressed or hopeless 2   PHQ-2 Score 4   Q1: Little interest or pleasure in doing things More than half the days   Q2: Feeling down, depressed or hopeless More than half the days   PHQ-2 Score 4       Abuse: Current or Past (Physical, Sexual or Emotional) - No  Do you feel safe in your environment? Yes    Have you ever done Advance Care Planning? (For example, a Health Directive, POLST, or a discussion with a medical provider or your loved ones about your wishes): No, advance care planning information given to patient to review.  Patient plans to discuss their wishes with loved ones or provider.      Social History     Tobacco Use     Smoking status: Current Every Day Smoker     Smokeless tobacco: Never Used     Tobacco comment: 10 cigs per day   Substance Use Topics     Alcohol use: Yes     Comment: once weekly 4-5 drinks          Alcohol Use 10/6/2020   Prescreen: >3 drinks/day or >7 drinks/week? Yes   AUDIT SCORE  3       Reviewed orders with patient.  Reviewed health maintenance and updated orders accordingly - Yes          Pertinent mammograms are reviewed under the imaging tab.  History of abnormal Pap smear:   PAP / HPV Latest Ref Rng & Units 9/13/2019   PAP -  "NIL   HPV 16 DNA NEG:Negative Negative   HPV 18 DNA NEG:Negative Negative   OTHER HR HPV NEG:Negative Negative     Reviewed and updated as needed this visit by clinical staff  Tobacco  Allergies  Meds  Problems  Med Hx  Surg Hx  Fam Hx          Reviewed and updated as needed this visit by Provider  Tobacco  Allergies  Meds  Problems  Med Hx  Surg Hx  Fam Hx             Review of Systems   Constitutional: Negative for chills and fever.   HENT: Negative for congestion, ear pain, hearing loss and sore throat.    Eyes: Negative for pain and visual disturbance.   Respiratory: Negative for cough and shortness of breath.    Cardiovascular: Negative for chest pain, palpitations and peripheral edema.   Gastrointestinal: Negative for abdominal pain, constipation, diarrhea, heartburn, hematochezia and nausea.   Breasts:  Negative for tenderness, breast mass and discharge.   Genitourinary: Negative for dysuria, frequency, genital sores, hematuria, pelvic pain, urgency, vaginal bleeding and vaginal discharge.   Musculoskeletal: Negative for arthralgias, joint swelling and myalgias.   Skin: Positive for rash.   Neurological: Positive for paresthesias. Negative for dizziness, weakness and headaches.   Psychiatric/Behavioral: Negative for mood changes. The patient is nervous/anxious.      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      OBJECTIVE:   /68   Pulse 71   Temp 98.3  F (36.8  C) (Oral)   Resp 20   Ht 1.683 m (5' 6.25\")   Wt 87.1 kg (192 lb)   SpO2 100%   BMI 30.76 kg/m    Physical Exam  GENERAL: healthy, alert and no distress  HENT: ear canals and TM's normal, nose and mouth without ulcers or " lesions  RESP: lungs clear to auscultation - no rales, rhonchi or wheezes  CV: regular rate and rhythm, normal S1 S2, no S3 or S4, no murmur, click or rub, no peripheral edema and peripheral pulses strong  ABDOMEN: soft, nontender, no hepatosplenomegaly, no masses and bowel sounds normal  MS: no gross musculoskeletal defects noted, no edema  SKIN: no suspicious lesions or rashes  NEURO: Normal strength and tone, mentation intact and speech normal  PSYCH: mentation appears normal, affect normal/bright    Diagnostic Test Results:  Labs reviewed in Epic    ASSESSMENT/PLAN:   1. Encounter for general adult medical examination with abnormal findings      2. Keratosis pilaris  Upper arms bilaterally     3. Weight gain  Want to try medication for weight loss   Friend has done metformin and topamax with success   - metFORMIN (GLUCOPHAGE-XR) 500 MG 24 hr tablet; Take 1 tablet (500 mg) by mouth daily (with dinner)  Dispense: 90 tablet; Refill: 1  - topiramate (TOPAMAX) 50 MG tablet; Take 0.5 tablets (25 mg) by mouth 2 times daily Take 1/2 tab daily twice daily - after 2 weeks increase to 1 tab daily  Dispense: 180 tablet; Refill: 1    4. Hypercholesteremia  Tolerating medication     5. Essential hypertension, benign  In good range   - hydrochlorothiazide (HYDRODIURIL) 25 MG tablet; Take 1 tablet (25 mg) by mouth daily  Dispense: 90 tablet; Refill: 3    6. Mild episode of recurrent major depressive disorder (H)  Stable on medication   - FLUoxetine (PROZAC) 20 MG capsule; Take 1 capsule (20 mg) by mouth daily  Dispense: 90 capsule; Refill: 3    7. Anxiety  Stable on medication   - FLUoxetine (PROZAC) 20 MG capsule; Take 1 capsule (20 mg) by mouth daily  Dispense: 90 capsule; Refill: 3    Patient has been advised of split billing requirements and indicates understanding: Yes  COUNSELING:  Reviewed preventive health counseling, as reflected in patient instructions       Regular exercise       Healthy diet/nutrition        "Osteoporosis Prevention/Bone Health       Colon cancer screening    Estimated body mass index is 30.76 kg/m  as calculated from the following:    Height as of this encounter: 1.683 m (5' 6.25\").    Weight as of this encounter: 87.1 kg (192 lb).    Weight management plan: Discussed healthy diet and exercise guidelines trial of med     She reports that she has been smoking. She has never used smokeless tobacco.  Tobacco Cessation Action Plan:   Information offered: Patient not interested at this time      Counseling Resources:  ATP IV Guidelines  Pooled Cohorts Equation Calculator  Breast Cancer Risk Calculator  BRCA-Related Cancer Risk Assessment: FHS-7 Tool  FRAX Risk Assessment  ICSI Preventive Guidelines  Dietary Guidelines for Americans, 2010  "[employer]'s MyPlate  ASA Prophylaxis  Lung CA Screening    MEHREEN Puckett [facility]  Answers for HPI/ROS submitted by the patient on 10/6/2020   Annual Exam:  If you checked off any problems, how difficult have these problems made it for you to do your work, take care of things at home, or get along with other people?: Not difficult at all  PHQ9 TOTAL SCORE: 3    "

## 2020-10-07 ASSESSMENT — PATIENT HEALTH QUESTIONNAIRE - PHQ9: SUM OF ALL RESPONSES TO PHQ QUESTIONS 1-9: 3

## 2020-11-20 ENCOUNTER — HOSPITAL ENCOUNTER (OUTPATIENT)
Facility: CLINIC | Age: 60
End: 2020-11-20
Attending: INTERNAL MEDICINE | Admitting: INTERNAL MEDICINE
Payer: COMMERCIAL

## 2020-11-29 DIAGNOSIS — Z11.59 ENCOUNTER FOR SCREENING FOR OTHER VIRAL DISEASES: Primary | ICD-10-CM

## 2021-01-28 DIAGNOSIS — Z71.6 ENCOUNTER FOR SMOKING CESSATION COUNSELING: ICD-10-CM

## 2021-01-28 DIAGNOSIS — Z72.0 TOBACCO ABUSE: ICD-10-CM

## 2021-01-29 ENCOUNTER — TELEPHONE (OUTPATIENT)
Dept: INTERNAL MEDICINE | Facility: CLINIC | Age: 61
End: 2021-01-29

## 2021-01-29 RX ORDER — ALBUTEROL SULFATE 90 UG/1
AEROSOL, METERED RESPIRATORY (INHALATION)
Qty: 18 G | Refills: 2 | Status: SHIPPED | OUTPATIENT
Start: 2021-01-29 | End: 2021-11-17

## 2021-01-29 NOTE — TELEPHONE ENCOUNTER
Pending Prescriptions:                       Disp   Refills    VENTOLIN  (90 Base) MCG/ACT inhale*18 g   2            Sig: INHALE 2 PUFFS INTO THE LUNGS EVERY 6 HOURS AS           NEEDED FOR SHORTNESS OF BREATH/DYSPNEA OR           WHEEZING.    Prescription approved per [facility] Refill Protocol.

## 2021-01-29 NOTE — TELEPHONE ENCOUNTER
Prior Authorization Retail Medication Request    Medication/Dose:  Albuterol sulfate  ICD code (if different than what is on RX):  unknown  Previously Tried and Failed:  unknown  Rationale:  unknown    Insurance Name:  unknown  Insurance ID:  unknown      Pharmacy Information (if different than what is on RX)  Name:  [facility]  Phone:  [phone]

## 2021-02-01 NOTE — TELEPHONE ENCOUNTER
Prior Authorization Not Needed per Insurance    Medication: VENTOLIN  (90 Base) MCG/ACT inhaler   Insurance Company: [employer] - Phone [phone] Fax [phone]  Expected CoPay:      Pharmacy Filling the Rx: [id number] - [place], [place] - [address] [place] [address]  Pharmacy Notified: Yes  Patient Notified: Yes **Instructed pharmacy to notify patient when script is ready to /ship.**    Contacted pharmacy to see which albuterol hfa they are processing.  They were able to get generic to go through.  Brand Venoltin isn't covered but they will fill generic and see if patient wants that. Pharmacy stated to disregard for now.

## 2021-02-08 RX ORDER — ONDANSETRON 2 MG/ML
4 INJECTION INTRAMUSCULAR; INTRAVENOUS
Status: CANCELLED | OUTPATIENT
Start: 2021-02-08

## 2021-02-08 RX ORDER — LIDOCAINE 40 MG/G
CREAM TOPICAL
Status: CANCELLED | OUTPATIENT
Start: 2021-02-08

## 2021-03-24 DIAGNOSIS — R63.5 WEIGHT GAIN: ICD-10-CM

## 2021-03-25 NOTE — TELEPHONE ENCOUNTER
This is a lab order for diabetes, it was ordered for weekend.  Please call the patient and get a recent weight so that we can document she is losing weight with the medication or at least not gaining any further weight.

## 2021-03-25 NOTE — TELEPHONE ENCOUNTER
Pending Prescriptions:                       Disp   Refills    metFORMIN (GLUCOPHAGE-XR) 500 MG 24 hr tab*90 tab*1        Sig: TAKE 1 TABLET(500 MG) BY MOUTH DAILY WITH DINNER    Routing refill request to provider for review/approval because:  No results found for: A1C

## 2021-03-30 NOTE — TELEPHONE ENCOUNTER
Spoke with patient who states that she weighs #180, she started at #192. Provider please advise.  VERNA WILKINSON CMA

## 2021-03-31 RX ORDER — METFORMIN HCL 500 MG
TABLET, EXTENDED RELEASE 24 HR ORAL
Qty: 90 TABLET | Refills: 0 | Status: SHIPPED | OUTPATIENT
Start: 2021-03-31 | End: 2021-06-30

## 2021-03-31 NOTE — TELEPHONE ENCOUNTER
rx filled for 90 days only she needs to make an appointment with Francine in the office so we can check her wt.

## 2021-06-13 DIAGNOSIS — Z11.59 ENCOUNTER FOR SCREENING FOR OTHER VIRAL DISEASES: ICD-10-CM

## 2021-06-30 VITALS — BODY MASS INDEX: 28.51 KG/M2 | WEIGHT: 178 LBS

## 2021-06-30 DIAGNOSIS — R63.5 WEIGHT GAIN: ICD-10-CM

## 2021-06-30 RX ORDER — METFORMIN HCL 500 MG
TABLET, EXTENDED RELEASE 24 HR ORAL
Qty: 90 TABLET | Refills: 1 | Status: SHIPPED | OUTPATIENT
Start: 2021-06-30 | End: 2021-11-18

## 2021-06-30 NOTE — TELEPHONE ENCOUNTER
Metformin. Per the message from March, she was supposed to come in and check her weight for Metformin refill. She takes it for weight gain.     No results found for: A1C    Lab Results   Component Value Date     09/28/2020     02/28/2019

## 2021-07-06 ENCOUNTER — HOSPITAL ENCOUNTER (OUTPATIENT)
Dept: MAMMOGRAPHY | Facility: CLINIC | Age: 61
Discharge: HOME | End: 2021-07-06
Attending: NURSE PRACTITIONER | Admitting: NURSE PRACTITIONER
Payer: COMMERCIAL

## 2021-07-06 DIAGNOSIS — Z12.31 VISIT FOR SCREENING MAMMOGRAM: ICD-10-CM

## 2021-07-06 PROCEDURE — 77063 BREAST TOMOSYNTHESIS BI: CPT

## 2021-09-06 DIAGNOSIS — Z11.59 ENCOUNTER FOR SCREENING FOR OTHER VIRAL DISEASES: ICD-10-CM

## 2021-10-01 DIAGNOSIS — E78.00 HYPERCHOLESTEREMIA: ICD-10-CM

## 2021-10-01 DIAGNOSIS — F41.9 ANXIETY: ICD-10-CM

## 2021-10-01 DIAGNOSIS — F33.0 MILD EPISODE OF RECURRENT MAJOR DEPRESSIVE DISORDER (H): ICD-10-CM

## 2021-10-01 RX ORDER — ATORVASTATIN CALCIUM 40 MG/1
TABLET, FILM COATED ORAL
Qty: 90 TABLET | Refills: 0 | Status: SHIPPED | OUTPATIENT
Start: 2021-10-01 | End: 2021-11-18

## 2021-10-01 NOTE — TELEPHONE ENCOUNTER
Pending Prescriptions:                       Disp   Refills    FLUoxetine (PROZAC) 20 MG capsule [Pharmac*180 ca*         Sig: TAKE 1 CAPSULE(20 MG) BY MOUTH TWICE DAILY  Routing refill request to provider for review/approval because:  Fails protocol  Next 5 appointments (look out 90 days)      Oct 26, 2021  7:00 AM  PHYSICAL with MEHREEN Puckett CNP  [facility] ([facility] [address]  [facility] [id number] [phone]

## 2021-10-01 NOTE — TELEPHONE ENCOUNTER
Medication is being filled for 1 time refill only due to:  Patient needs to be seen because needs OV and Labs.    Pt has appt scheduled.  Refill to get patient through until then    Marium Farrell RN, BSN

## 2021-11-03 DIAGNOSIS — Z11.59 ENCOUNTER FOR SCREENING FOR OTHER VIRAL DISEASES: ICD-10-CM

## 2021-11-18 ENCOUNTER — OFFICE VISIT (OUTPATIENT)
Dept: INTERNAL MEDICINE | Facility: CLINIC | Age: 61
End: 2021-11-18
Payer: COMMERCIAL

## 2021-11-18 VITALS
HEART RATE: 73 BPM | BODY MASS INDEX: 28.61 KG/M2 | DIASTOLIC BLOOD PRESSURE: 66 MMHG | RESPIRATION RATE: 18 BRPM | TEMPERATURE: 98 F | HEIGHT: 66 IN | SYSTOLIC BLOOD PRESSURE: 112 MMHG | WEIGHT: 178 LBS | OXYGEN SATURATION: 100 %

## 2021-11-18 DIAGNOSIS — F41.9 ANXIETY: ICD-10-CM

## 2021-11-18 DIAGNOSIS — I10 ESSENTIAL HYPERTENSION, BENIGN: ICD-10-CM

## 2021-11-18 DIAGNOSIS — Z72.0 TOBACCO ABUSE: ICD-10-CM

## 2021-11-18 DIAGNOSIS — R73.09 ELEVATED GLUCOSE: ICD-10-CM

## 2021-11-18 DIAGNOSIS — E78.00 HYPERCHOLESTEREMIA: ICD-10-CM

## 2021-11-18 DIAGNOSIS — Z71.6 ENCOUNTER FOR SMOKING CESSATION COUNSELING: ICD-10-CM

## 2021-11-18 DIAGNOSIS — Z00.00 ROUTINE GENERAL MEDICAL EXAMINATION AT A HEALTH CARE FACILITY: Primary | ICD-10-CM

## 2021-11-18 DIAGNOSIS — R63.5 WEIGHT GAIN: ICD-10-CM

## 2021-11-18 DIAGNOSIS — F33.0 MILD EPISODE OF RECURRENT MAJOR DEPRESSIVE DISORDER (H): ICD-10-CM

## 2021-11-18 LAB
ALBUMIN SERPL-MCNC: 4 G/DL (ref 3.4–5)
ALP SERPL-CCNC: 61 U/L (ref 40–150)
ALT SERPL W P-5'-P-CCNC: 20 U/L (ref 0–50)
ANION GAP SERPL CALCULATED.3IONS-SCNC: 8 MMOL/L (ref 3–14)
AST SERPL W P-5'-P-CCNC: 11 U/L (ref 0–45)
BASOPHILS # BLD AUTO: 0 10E3/UL (ref 0–0.2)
BASOPHILS NFR BLD AUTO: 0 %
BILIRUB SERPL-MCNC: 0.8 MG/DL (ref 0.2–1.3)
BUN SERPL-MCNC: 12 MG/DL (ref 7–30)
CALCIUM SERPL-MCNC: 9.3 MG/DL (ref 8.5–10.1)
CHLORIDE BLD-SCNC: 101 MMOL/L (ref 94–109)
CHOLEST SERPL-MCNC: 238 MG/DL
CO2 SERPL-SCNC: 26 MMOL/L (ref 20–32)
CREAT SERPL-MCNC: 0.86 MG/DL (ref 0.52–1.04)
EOSINOPHIL # BLD AUTO: 0 10E3/UL (ref 0–0.7)
EOSINOPHIL NFR BLD AUTO: 1 %
ERYTHROCYTE [DISTWIDTH] IN BLOOD BY AUTOMATED COUNT: 12.5 % (ref 10–15)
FASTING STATUS PATIENT QL REPORTED: YES
GFR SERPL CREATININE-BSD FRML MDRD: 74 ML/MIN/1.73M2
GLUCOSE BLD-MCNC: 112 MG/DL (ref 70–99)
HBA1C MFR BLD: 5.9 % (ref 0–5.6)
HCT VFR BLD AUTO: 39.3 % (ref 35–47)
HDLC SERPL-MCNC: 60 MG/DL
HGB BLD-MCNC: 13.2 G/DL (ref 11.7–15.7)
LDLC SERPL CALC-MCNC: 150 MG/DL
LYMPHOCYTES # BLD AUTO: 2.1 10E3/UL (ref 0.8–5.3)
LYMPHOCYTES NFR BLD AUTO: 31 %
MCH RBC QN AUTO: 31.9 PG (ref 26.5–33)
MCHC RBC AUTO-ENTMCNC: 33.6 G/DL (ref 31.5–36.5)
MCV RBC AUTO: 95 FL (ref 78–100)
MONOCYTES # BLD AUTO: 0.6 10E3/UL (ref 0–1.3)
MONOCYTES NFR BLD AUTO: 9 %
NEUTROPHILS # BLD AUTO: 4 10E3/UL (ref 1.6–8.3)
NEUTROPHILS NFR BLD AUTO: 60 %
NONHDLC SERPL-MCNC: 178 MG/DL
PLATELET # BLD AUTO: 257 10E3/UL (ref 150–450)
POTASSIUM BLD-SCNC: 3.6 MMOL/L (ref 3.4–5.3)
PROT SERPL-MCNC: 7.6 G/DL (ref 6.8–8.8)
RBC # BLD AUTO: 4.14 10E6/UL (ref 3.8–5.2)
SODIUM SERPL-SCNC: 135 MMOL/L (ref 133–144)
T4 FREE SERPL-MCNC: 1.02 NG/DL (ref 0.76–1.46)
TRIGL SERPL-MCNC: 138 MG/DL
TSH SERPL DL<=0.005 MIU/L-ACNC: 4.49 MU/L (ref 0.4–4)
WBC # BLD AUTO: 6.7 10E3/UL (ref 4–11)

## 2021-11-18 PROCEDURE — 36415 COLL VENOUS BLD VENIPUNCTURE: CPT | Performed by: NURSE PRACTITIONER

## 2021-11-18 PROCEDURE — 99396 PREV VISIT EST AGE 40-64: CPT | Performed by: NURSE PRACTITIONER

## 2021-11-18 PROCEDURE — 80050 GENERAL HEALTH PANEL: CPT | Performed by: NURSE PRACTITIONER

## 2021-11-18 PROCEDURE — 83036 HEMOGLOBIN GLYCOSYLATED A1C: CPT | Performed by: NURSE PRACTITIONER

## 2021-11-18 PROCEDURE — 80061 LIPID PANEL: CPT | Performed by: NURSE PRACTITIONER

## 2021-11-18 PROCEDURE — 84439 ASSAY OF FREE THYROXINE: CPT | Performed by: NURSE PRACTITIONER

## 2021-11-18 RX ORDER — ALBUTEROL SULFATE 90 UG/1
AEROSOL, METERED RESPIRATORY (INHALATION)
Qty: 8.5 G | Refills: 11 | Status: SHIPPED | OUTPATIENT
Start: 2021-11-18 | End: 2022-11-29

## 2021-11-18 RX ORDER — HYDROCHLOROTHIAZIDE 25 MG/1
25 TABLET ORAL DAILY
Qty: 90 TABLET | Refills: 3 | Status: SHIPPED | OUTPATIENT
Start: 2021-11-18 | End: 2022-09-27

## 2021-11-18 RX ORDER — ATORVASTATIN CALCIUM 40 MG/1
40 TABLET, FILM COATED ORAL DAILY
Qty: 90 TABLET | Refills: 3 | Status: SHIPPED | OUTPATIENT
Start: 2021-11-18 | End: 2024-01-01

## 2021-11-18 RX ORDER — METFORMIN HCL 500 MG
TABLET, EXTENDED RELEASE 24 HR ORAL
Qty: 90 TABLET | Refills: 3 | Status: SHIPPED | OUTPATIENT
Start: 2021-11-18 | End: 2022-06-02

## 2021-11-18 ASSESSMENT — ENCOUNTER SYMPTOMS
HEADACHES: 0
HEMATOCHEZIA: 0
SHORTNESS OF BREATH: 0
HEMATURIA: 0
DYSURIA: 0
SORE THROAT: 0
PARESTHESIAS: 0
MYALGIAS: 0
CONSTIPATION: 0
ARTHRALGIAS: 0
FREQUENCY: 0
CHILLS: 0
JOINT SWELLING: 0
DIZZINESS: 0
EYE PAIN: 0
FEVER: 0
NERVOUS/ANXIOUS: 0
WEAKNESS: 0
DIARRHEA: 0
COUGH: 0
BREAST MASS: 0
PALPITATIONS: 0
ABDOMINAL PAIN: 0
NAUSEA: 0
HEARTBURN: 0

## 2021-11-18 ASSESSMENT — ANXIETY QUESTIONNAIRES
7. FEELING AFRAID AS IF SOMETHING AWFUL MIGHT HAPPEN: SEVERAL DAYS
2. NOT BEING ABLE TO STOP OR CONTROL WORRYING: SEVERAL DAYS
4. TROUBLE RELAXING: NOT AT ALL
3. WORRYING TOO MUCH ABOUT DIFFERENT THINGS: SEVERAL DAYS
5. BEING SO RESTLESS THAT IT IS HARD TO SIT STILL: NOT AT ALL
GAD7 TOTAL SCORE: 5
6. BECOMING EASILY ANNOYED OR IRRITABLE: SEVERAL DAYS
8. IF YOU CHECKED OFF ANY PROBLEMS, HOW DIFFICULT HAVE THESE MADE IT FOR YOU TO DO YOUR WORK, TAKE CARE OF THINGS AT HOME, OR GET ALONG WITH OTHER PEOPLE?: SOMEWHAT DIFFICULT
7. FEELING AFRAID AS IF SOMETHING AWFUL MIGHT HAPPEN: SEVERAL DAYS
1. FEELING NERVOUS, ANXIOUS, OR ON EDGE: SEVERAL DAYS
GAD7 TOTAL SCORE: 5
GAD7 TOTAL SCORE: 5

## 2021-11-18 ASSESSMENT — PATIENT HEALTH QUESTIONNAIRE - PHQ9
SUM OF ALL RESPONSES TO PHQ QUESTIONS 1-9: 9
SUM OF ALL RESPONSES TO PHQ QUESTIONS 1-9: 9
10. IF YOU CHECKED OFF ANY PROBLEMS, HOW DIFFICULT HAVE THESE PROBLEMS MADE IT FOR YOU TO DO YOUR WORK, TAKE CARE OF THINGS AT HOME, OR GET ALONG WITH OTHER PEOPLE: NOT DIFFICULT AT ALL

## 2021-11-18 ASSESSMENT — MIFFLIN-ST. JEOR: SCORE: 1398.12

## 2021-11-18 NOTE — PROGRESS NOTES
Answers for HPI/ROS submitted by the patient on 11/18/2021  If you checked off any problems, how difficult have these problems made it for you to do your work, take care of things at home, or get along with other people?: Not difficult at all  PHQ9 TOTAL SCORE: 9  JENNIFFER 7 TOTAL SCORE: 5       SUBJECTIVE:   CC: Lani Walker is an 61 year old woman who presents for preventive health visit.       Patient has been advised of split billing requirements and indicates understanding:   Healthy Habits:     Getting at least 3 servings of Calcium per day:  Yes    Bi-annual eye exam:  NO    Dental care twice a year:  Yes    Sleep apnea or symptoms of sleep apnea:  None    Diet:  Regular (no restrictions)    Frequency of exercise:  2-3 days/week    Duration of exercise:  15-30 minutes    Taking medications regularly:  Yes    PHQ-2 Total Score: 2    Additional concerns today:  No        Today's PHQ-2 Score:   PHQ-2 ( 1999 [employer]) 11/18/2021   Q1: Little interest or pleasure in doing things 1   Q2: Feeling down, depressed or hopeless 1   PHQ-2 Score 2   PHQ-2 Total Score (12-17 Years)- Positive if 3 or more points; Administer PHQ-A if positive -   Q1: Little interest or pleasure in doing things Several days   Q2: Feeling down, depressed or hopeless Several days   PHQ-2 Score 2       Abuse: Current or Past (Physical, Sexual or Emotional) - No  Do you feel safe in your environment? Yes        Social History     Tobacco Use     Smoking status: Current Every Day Smoker     Smokeless tobacco: Never Used     Tobacco comment: 10 cigs per day   Substance Use Topics     Alcohol use: Yes     Comment: once weekly 4-5 drinks          Alcohol Use 11/18/2021   Prescreen: >3 drinks/day or >7 drinks/week? No   AUDIT SCORE  -       Reviewed orders with patient.  Reviewed health maintenance and updated orders accordingly - Yes      Breast Cancer Screening:  Any new diagnosis of family breast, ovarian, or bowel cancer?     FHS-7:   Breast CA Risk  Assessment (FHS-7) 11/18/2021   Did any of your first-degree relatives have breast or ovarian cancer? Yes   Did any of your relatives have bilateral breast cancer? Yes   Did any man in your family have breast cancer? No   Did any woman in your family have breast and ovarian cancer? Yes   Did any woman in your family have breast cancer before age 50 y? No   Do you have 2 or more relatives with breast and/or ovarian cancer? Yes   Do you have 2 or more relatives with breast and/or bowel cancer? No         Pertinent mammograms are reviewed under the imaging tab.    History of abnormal Pap smear:   PAP / HPV Latest Ref Rng & Units 9/13/2019   PAP (Historical) - NIL   HPV16 NEG:Negative Negative   HPV18 NEG:Negative Negative   HRHPV NEG:Negative Negative     Reviewed and updated as needed this visit by clinical staff  Tobacco  Allergies  Meds  Problems  Med Hx  Surg Hx  Fam Hx  Soc Hx         Reviewed and updated as needed this visit by Provider   Allergies                 Review of Systems   Constitutional: Negative for chills and fever.   HENT: Negative for congestion, ear pain, hearing loss and sore throat.    Eyes: Negative for pain and visual disturbance.   Respiratory: Negative for cough and shortness of breath.    Cardiovascular: Negative for chest pain, palpitations and peripheral edema.   Gastrointestinal: Negative for abdominal pain, constipation, diarrhea, heartburn, hematochezia and nausea.   Breasts:  Negative for tenderness, breast mass and discharge.   Genitourinary: Negative for dysuria, frequency, genital sores, hematuria, pelvic pain, urgency, vaginal bleeding and vaginal discharge.   Musculoskeletal: Negative for arthralgias, joint swelling and myalgias.   Skin: Negative for rash.   Neurological: Negative for dizziness, weakness, headaches and paresthesias.   Psychiatric/Behavioral: Positive for mood changes. The patient is not nervous/anxious.      Colon 2017 [facility] [place]     Some weight loss  "with metformin - wants to continue    Doing well on current medication       OBJECTIVE:   /66   Pulse 73   Temp 98  F (36.7  C) (Oral)   Resp 18   Ht 1.683 m (5' 6.25\")   Wt 80.7 kg (178 lb)   SpO2 100%   BMI 28.51 kg/m    Physical Exam  GENERAL:  alert and no distress  HENT: ear canals and TM's normal, nose and mouth without ulcers or lesions  RESP: lungs clear to auscultation - no rales, rhonchi or wheezes  CV: regular rate and rhythm  ABDOMEN: soft, nontender,  and bowel sounds normal  MS: no gross musculoskeletal defects noted, no edema  SKIN: no suspicious lesions or rashes  NEURO: Normal strength and tone, mentation intact and speech normal  PSYCH: mentation appears normal, affect normal/bright    Diagnostic Test Results:  Labs reviewed in [facility]  Lab     ASSESSMENT/PLAN:   (Z00.00) Routine general medical examination at a health care facility  (primary encounter diagnosis)  Comment:   Plan: Lipid panel reflex to direct LDL Fasting,         Comprehensive metabolic panel (BMP + Alb, Alk         Phos, ALT, AST, Total. Bili, TP), TSH with free        T4 reflex, CBC with platelets and differential            (F33.0) Mild episode of recurrent major depressive disorder (H)  Comment: stable   Plan: Comprehensive metabolic panel (BMP + Alb, Alk         Phos, ALT, AST, Total. Bili, TP), TSH with free        T4 reflex, CBC with platelets and differential,        FLUoxetine (PROZAC) 20 MG capsule            (E78.00) Hypercholesteremia  Comment: tolerating medication   Plan: Lipid panel reflex to direct LDL Fasting,         Comprehensive metabolic panel (BMP + Alb, Alk         Phos, ALT, AST, Total. Bili, TP), atorvastatin         (LIPITOR) 40 MG tablet            (I10) Essential hypertension, benign  Comment: in good range   Plan: Comprehensive metabolic panel (BMP + Alb, Alk         Phos, ALT, AST, Total. Bili, TP), TSH with free        T4 reflex, CBC with platelets and differential,        " "hydrochlorothiazide (HYDRODIURIL) 25 MG tablet            (F41.9) Anxiety  Comment: stable on med   Plan: FLUoxetine (PROZAC) 20 MG capsule            (Z72.0) Tobacco abuse  Comment:   Plan: albuterol (PROAIR HFA/PROVENTIL HFA/VENTOLIN         HFA) 108 (90 Base) MCG/ACT inhaler       (Z71.6) Encounter for smoking cessation counseling  Comment:   Plan: albuterol (PROAIR HFA/PROVENTIL HFA/VENTOLIN         HFA) 108 (90 Base) MCG/ACT inhaler          (R63.5) Weight gain  Comment has lost weight  Wt Readings from Last 4 Encounters:   11/18/21 80.7 kg (178 lb)   06/30/21 80.7 kg (178 lb)   10/06/20 87.1 kg (192 lb)   09/13/19 83.9 kg (185 lb)       Plan: metFORMIN (GLUCOPHAGE-XR) 500 MG 24 hr tablet            (R73.09) Elevated glucose  Comment:   Plan: Hemoglobin A1c              Patient has been advised of split billing requirements and indicates understanding:   COUNSELING:  Reviewed preventive health counseling, as reflected in patient instructions       Regular exercise       Healthy diet/nutrition       Osteoporosis prevention/bone health    Estimated body mass index is 28.51 kg/m  as calculated from the following:    Height as of this encounter: 1.683 m (5' 6.25\").    Weight as of this encounter: 80.7 kg (178 lb).    Weight management plan: Discussed healthy diet and exercise guidelines metformin    She reports that she has been smoking. She has never used smokeless tobacco.  Tobacco Cessation Action Plan:   Information offered: Patient not interested at this time      Counseling Resources:  ATP IV Guidelines  Pooled Cohorts Equation Calculator  Breast Cancer Risk Calculator  BRCA-Related Cancer Risk Assessment: FHS-7 Tool  FRAX Risk Assessment  ICSI Preventive Guidelines  Dietary Guidelines for Americans, 2010  USDA's MyPlate  ASA Prophylaxis  Lung CA Screening    MEHREEN Puckett [facility]  "

## 2021-11-18 NOTE — NURSING NOTE
"Chief Complaint   Patient presents with     Physical     fasting     initial /66   Pulse 73   Temp 98  F (36.7  C) (Oral)   Resp 18   Ht 1.683 m (5' 6.25\")   Wt 80.7 kg (178 lb)   SpO2 100%   BMI 28.51 kg/m   Estimated body mass index is 28.51 kg/m  as calculated from the following:    Height as of this encounter: 1.683 m (5' 6.25\").    Weight as of this encounter: 80.7 kg (178 lb)..  bp completed using cuff size large  STIVEN MONTIEL LPN  " No

## 2021-11-19 ASSESSMENT — ANXIETY QUESTIONNAIRES: GAD7 TOTAL SCORE: 5

## 2021-11-19 ASSESSMENT — PATIENT HEALTH QUESTIONNAIRE - PHQ9: SUM OF ALL RESPONSES TO PHQ QUESTIONS 1-9: 9

## 2021-12-29 DIAGNOSIS — R63.5 WEIGHT GAIN: ICD-10-CM

## 2021-12-31 RX ORDER — TOPIRAMATE 50 MG/1
TABLET, FILM COATED ORAL
Qty: 180 TABLET | Refills: 1 | OUTPATIENT
Start: 2021-12-31

## 2021-12-31 NOTE — TELEPHONE ENCOUNTER
Medication refused, medication discontinued.  Patient reported not taking on 11/18/21.    Pending Prescriptions:                       Disp   Refills    topiramate (TOPAMAX) 50 MG tablet [Pharma*180 ta*1            Sig: TAKE 1/2 TABLET TWICE DAILY BY MOUTH FOR 2 WEEKS           AND THEN INCREASE TO TAKE 1 TABLET BY MOUTH DAILY

## 2022-01-01 DIAGNOSIS — I10 ESSENTIAL HYPERTENSION, BENIGN: ICD-10-CM

## 2022-01-01 DIAGNOSIS — R63.5 WEIGHT GAIN: ICD-10-CM

## 2022-01-01 DIAGNOSIS — E78.00 HYPERCHOLESTEREMIA: ICD-10-CM

## 2022-01-03 RX ORDER — METFORMIN HCL 500 MG
TABLET, EXTENDED RELEASE 24 HR ORAL
Qty: 90 TABLET | Refills: 3 | OUTPATIENT
Start: 2022-01-03

## 2022-01-03 RX ORDER — HYDROCHLOROTHIAZIDE 25 MG/1
TABLET ORAL
Qty: 90 TABLET | Refills: 3 | OUTPATIENT
Start: 2022-01-03

## 2022-01-03 RX ORDER — ATORVASTATIN CALCIUM 40 MG/1
TABLET, FILM COATED ORAL
Qty: 90 TABLET | Refills: 3 | OUTPATIENT
Start: 2022-01-03

## 2022-01-04 NOTE — TELEPHONE ENCOUNTER
Should have refills on file  E-Prescribing Status: Receipt confirmed by pharmacy (11/18/2021  7:49 AM CST)    Marium DUEÑAS RN, BSN

## 2022-01-05 DIAGNOSIS — Z72.0 TOBACCO ABUSE: ICD-10-CM

## 2022-01-05 DIAGNOSIS — Z71.6 ENCOUNTER FOR SMOKING CESSATION COUNSELING: ICD-10-CM

## 2022-01-07 RX ORDER — ALBUTEROL SULFATE 90 UG/1
AEROSOL, METERED RESPIRATORY (INHALATION)
Qty: 8.5 G | Refills: 0 | OUTPATIENT
Start: 2022-01-07

## 2022-03-02 ENCOUNTER — OFFICE VISIT (OUTPATIENT)
Dept: URGENT CARE | Facility: URGENT CARE | Age: 62
End: 2022-03-02
Payer: COMMERCIAL

## 2022-03-02 VITALS
OXYGEN SATURATION: 97 % | DIASTOLIC BLOOD PRESSURE: 80 MMHG | RESPIRATION RATE: 16 BRPM | BODY MASS INDEX: 29.17 KG/M2 | WEIGHT: 182.1 LBS | TEMPERATURE: 98.2 F | HEART RATE: 82 BPM | SYSTOLIC BLOOD PRESSURE: 120 MMHG

## 2022-03-02 DIAGNOSIS — R30.0 DYSURIA: Primary | ICD-10-CM

## 2022-03-02 DIAGNOSIS — B37.31 CANDIDIASIS OF VAGINA: ICD-10-CM

## 2022-03-02 LAB
ALBUMIN UR-MCNC: NEGATIVE MG/DL
APPEARANCE UR: CLEAR
BACTERIA #/AREA URNS HPF: ABNORMAL /HPF
BILIRUB UR QL STRIP: NEGATIVE
CLUE CELLS: ABNORMAL
COLOR UR AUTO: YELLOW
GLUCOSE UR STRIP-MCNC: NEGATIVE MG/DL
HGB UR QL STRIP: NEGATIVE
KETONES UR STRIP-MCNC: NEGATIVE MG/DL
LEUKOCYTE ESTERASE UR QL STRIP: ABNORMAL
NITRATE UR QL: NEGATIVE
PH UR STRIP: 6 [PH] (ref 5–7)
RBC #/AREA URNS AUTO: ABNORMAL /HPF
SP GR UR STRIP: 1.01 (ref 1–1.03)
SQUAMOUS #/AREA URNS AUTO: ABNORMAL /LPF
TRICHOMONAS, WET PREP: ABNORMAL
UROBILINOGEN UR STRIP-ACNC: 0.2 E.U./DL
WBC # BLD AUTO: 7.2 10E3/UL (ref 4–11)
WBC #/AREA URNS AUTO: ABNORMAL /HPF
WBC'S/HIGH POWER FIELD, WET PREP: ABNORMAL
YEAST, WET PREP: PRESENT

## 2022-03-02 PROCEDURE — 99214 OFFICE O/P EST MOD 30 MIN: CPT | Performed by: FAMILY MEDICINE

## 2022-03-02 PROCEDURE — 36415 COLL VENOUS BLD VENIPUNCTURE: CPT | Performed by: FAMILY MEDICINE

## 2022-03-02 PROCEDURE — 87210 SMEAR WET MOUNT SALINE/INK: CPT | Performed by: FAMILY MEDICINE

## 2022-03-02 PROCEDURE — 85048 AUTOMATED LEUKOCYTE COUNT: CPT | Performed by: FAMILY MEDICINE

## 2022-03-02 PROCEDURE — 81001 URINALYSIS AUTO W/SCOPE: CPT | Performed by: FAMILY MEDICINE

## 2022-03-02 RX ORDER — FLUCONAZOLE 150 MG/1
TABLET ORAL
Qty: 2 TABLET | Refills: 0 | Status: SHIPPED | OUTPATIENT
Start: 2022-03-02 | End: 2022-04-27

## 2022-03-02 NOTE — PATIENT INSTRUCTIONS
Test shows that you have a yeast infection, and this could be contributing to symptoms.    Take fluconazole 150 mg today.  Repeat in 3 days.

## 2022-03-02 NOTE — PROGRESS NOTES
"ASSESSMENT:    ICD-10-CM    1. Dysuria  R30.0 UA Macro with Reflex to Micro and Culture - lab collect     UA Macro with Reflex to Micro and Culture - lab collect     Urine Microscopic     WBC count     Wet prep - Clinic Collect     WBC count   2. Candidiasis of vagina  B37.3 fluconazole (DIFLUCAN) 150 MG tablet     Today's UA with micro shows resolution of prior UTI.  WBC normal.  Wet prep indicates vaginal candidiasis, which could be contributing to symptoms.  No suspicion for cauda equina syndrome at this time.    PLAN:  Fluconazole 150 mg x 1 dose today.  Repeat in 3 days if needed.    If symptoms still present in a week, follow up with primary care provider for further investigation.    SUBJECTIVE:  Lani Walker is a 61 year old female who presents to  today with concern for possible UTI.  Was at [facility] 2 weeks ago and was diagnosed with UTI.  After her visit, she got a call saying they had done a urine culture and that showed \"Pseudo-something\" and had her start taking cephalexin 500 mg TID x 7 days (per the empty med bottle she brought in today).  She hasn't had any fevers in the last two weeks.  Has been feeling more tired than usual.  Having some low back pain that wrapped around L side.  Is worried that UTI didn't fully clear.    OBJECTIVE:  /80 (BP Location: Right arm, Patient Position: Chair, Cuff Size: Adult Large)   Pulse 82   Temp 98.2  F (36.8  C) (Oral)   Resp 16   Wt 82.6 kg (182 lb 1.6 oz)   SpO2 97%   Breastfeeding No   BMI 29.17 kg/m    GEN: non-toxic, in NAD  BACK: no CVA tenderness, mild lumbar paraspinal tenderness bilaterally  SKIN: no rashes on the back      Results for orders placed or performed in visit on 03/02/22   UA Macro with Reflex to Micro and Culture - lab collect     Status: Abnormal    Specimen: Urine, Clean Catch   Result Value Ref Range    Color Urine Yellow Colorless, Straw, Light Yellow, Yellow    Appearance Urine Clear Clear    Glucose Urine " Negative Negative mg/dL    Bilirubin Urine Negative Negative    Ketones Urine Negative Negative mg/dL    Specific Gravity Urine 1.010 1.003 - 1.035    Blood Urine Negative Negative    pH Urine 6.0 5.0 - 7.0    Protein Albumin Urine Negative Negative mg/dL    Urobilinogen Urine 0.2 0.2, 1.0 E.U./dL    Nitrite Urine Negative Negative    Leukocyte Esterase Urine Small (A) Negative   Urine Microscopic     Status: Abnormal   Result Value Ref Range    Bacteria Urine Few (A) None Seen /HPF    RBC Urine None Seen 0-2 /HPF /HPF    WBC Urine 0-5 0-5 /HPF /HPF    Squamous Epithelials Urine Few (A) None Seen /LPF    Narrative    Urine Culture not indicated   WBC count     Status: Normal   Result Value Ref Range    WBC Count 7.2 4.0 - 11.0 10e3/uL   Wet prep - Clinic Collect     Status: Abnormal    Specimen: Vagina; Swab   Result Value Ref Range    Trichomonas Absent Absent    Yeast Present (A) Absent    Clue Cells Absent Absent    WBCs/high power field 2+ (A) None

## 2022-03-15 DIAGNOSIS — F41.9 ANXIETY: ICD-10-CM

## 2022-03-15 DIAGNOSIS — Z11.59 ENCOUNTER FOR SCREENING FOR OTHER VIRAL DISEASES: Primary | ICD-10-CM

## 2022-03-15 DIAGNOSIS — F33.0 MILD EPISODE OF RECURRENT MAJOR DEPRESSIVE DISORDER (H): ICD-10-CM

## 2022-03-16 NOTE — TELEPHONE ENCOUNTER
Routing refill request to provider for review/approval because:  Phq 9 score out of protocol range              PHQ 9/13/2019 10/6/2020 11/18/2021   PHQ-9 Total Score 5 3 9   Q9: Thoughts of better off dead/self-harm past 2 weeks Not at all Not at all Not at all

## 2022-04-18 ENCOUNTER — LAB (OUTPATIENT)
Dept: LAB | Facility: CLINIC | Age: 62
End: 2022-04-18
Attending: INTERNAL MEDICINE
Payer: COMMERCIAL

## 2022-04-18 DIAGNOSIS — Z11.59 ENCOUNTER FOR SCREENING FOR OTHER VIRAL DISEASES: ICD-10-CM

## 2022-04-18 LAB — SARS-COV-2 RNA RESP QL NAA+PROBE: NEGATIVE

## 2022-04-18 PROCEDURE — U0003 INFECTIOUS AGENT DETECTION BY NUCLEIC ACID (DNA OR RNA); SEVERE ACUTE RESPIRATORY SYNDROME CORONAVIRUS 2 (SARS-COV-2) (CORONAVIRUS DISEASE [COVID-19]), AMPLIFIED PROBE TECHNIQUE, MAKING USE OF HIGH THROUGHPUT TECHNOLOGIES AS DESCRIBED BY CMS-2020-01-R: HCPCS

## 2022-04-21 ENCOUNTER — HOSPITAL ENCOUNTER (OUTPATIENT)
Facility: CLINIC | Age: 62
Discharge: HOME | End: 2022-04-21
Attending: INTERNAL MEDICINE | Admitting: INTERNAL MEDICINE
Payer: COMMERCIAL

## 2022-04-21 VITALS
OXYGEN SATURATION: 98 % | BODY MASS INDEX: 27.47 KG/M2 | TEMPERATURE: 98 F | HEIGHT: 67 IN | HEART RATE: 80 BPM | WEIGHT: 175 LBS | RESPIRATION RATE: 16 BRPM | SYSTOLIC BLOOD PRESSURE: 113 MMHG | DIASTOLIC BLOOD PRESSURE: 80 MMHG

## 2022-04-21 LAB — COLONOSCOPY: NORMAL

## 2022-04-21 PROCEDURE — G0500 MOD SEDAT ENDO SERVICE >5YRS: HCPCS | Performed by: INTERNAL MEDICINE

## 2022-04-21 PROCEDURE — G0105 COLORECTAL SCRN; HI RISK IND: HCPCS | Performed by: INTERNAL MEDICINE

## 2022-04-21 PROCEDURE — 45378 DIAGNOSTIC COLONOSCOPY: CPT | Performed by: INTERNAL MEDICINE

## 2022-04-21 PROCEDURE — 250N000011 HC RX IP 250 OP 636: Performed by: INTERNAL MEDICINE

## 2022-04-21 RX ORDER — FENTANYL CITRATE 0.05 MG/ML
50-100 INJECTION, SOLUTION INTRAMUSCULAR; INTRAVENOUS EVERY 5 MIN PRN
Status: DISCONTINUED | OUTPATIENT
Start: 2022-04-21 | End: 2022-04-21 | Stop reason: HOSPADM

## 2022-04-21 RX ORDER — FLUMAZENIL 0.1 MG/ML
0.2 INJECTION, SOLUTION INTRAVENOUS
Status: DISCONTINUED | OUTPATIENT
Start: 2022-04-21 | End: 2022-04-21 | Stop reason: HOSPADM

## 2022-04-21 RX ORDER — LIDOCAINE 40 MG/G
CREAM TOPICAL
Status: DISCONTINUED | OUTPATIENT
Start: 2022-04-21 | End: 2022-04-21 | Stop reason: HOSPADM

## 2022-04-21 RX ORDER — NALOXONE HYDROCHLORIDE 0.4 MG/ML
0.4 INJECTION, SOLUTION INTRAMUSCULAR; INTRAVENOUS; SUBCUTANEOUS
Status: DISCONTINUED | OUTPATIENT
Start: 2022-04-21 | End: 2022-04-21 | Stop reason: HOSPADM

## 2022-04-21 RX ORDER — DIPHENHYDRAMINE HYDROCHLORIDE 50 MG/ML
25-50 INJECTION INTRAMUSCULAR; INTRAVENOUS
Status: DISCONTINUED | OUTPATIENT
Start: 2022-04-21 | End: 2022-04-21 | Stop reason: HOSPADM

## 2022-04-21 RX ORDER — NALOXONE HYDROCHLORIDE 0.4 MG/ML
0.2 INJECTION, SOLUTION INTRAMUSCULAR; INTRAVENOUS; SUBCUTANEOUS
Status: DISCONTINUED | OUTPATIENT
Start: 2022-04-21 | End: 2022-04-21 | Stop reason: HOSPADM

## 2022-04-21 RX ORDER — ONDANSETRON 4 MG/1
4 TABLET, ORALLY DISINTEGRATING ORAL EVERY 6 HOURS PRN
Status: DISCONTINUED | OUTPATIENT
Start: 2022-04-21 | End: 2022-04-21 | Stop reason: HOSPADM

## 2022-04-21 RX ORDER — SIMETHICONE 40MG/0.6ML
133 SUSPENSION, DROPS(FINAL DOSAGE FORM)(ML) ORAL
Status: DISCONTINUED | OUTPATIENT
Start: 2022-04-21 | End: 2022-04-21 | Stop reason: HOSPADM

## 2022-04-21 RX ORDER — EPINEPHRINE 1 MG/ML
0.1 INJECTION, SOLUTION INTRAMUSCULAR; SUBCUTANEOUS
Status: DISCONTINUED | OUTPATIENT
Start: 2022-04-21 | End: 2022-04-21 | Stop reason: HOSPADM

## 2022-04-21 RX ORDER — ATROPINE SULFATE 0.4 MG/ML
1 AMPUL (ML) INJECTION
Status: DISCONTINUED | OUTPATIENT
Start: 2022-04-21 | End: 2022-04-21 | Stop reason: HOSPADM

## 2022-04-21 RX ORDER — ONDANSETRON 2 MG/ML
4 INJECTION INTRAMUSCULAR; INTRAVENOUS EVERY 6 HOURS PRN
Status: DISCONTINUED | OUTPATIENT
Start: 2022-04-21 | End: 2022-04-21 | Stop reason: HOSPADM

## 2022-04-21 RX ORDER — ONDANSETRON 2 MG/ML
4 INJECTION INTRAMUSCULAR; INTRAVENOUS
Status: DISCONTINUED | OUTPATIENT
Start: 2022-04-21 | End: 2022-04-21 | Stop reason: HOSPADM

## 2022-04-21 RX ORDER — PROCHLORPERAZINE MALEATE 10 MG
10 TABLET ORAL EVERY 6 HOURS PRN
Status: DISCONTINUED | OUTPATIENT
Start: 2022-04-21 | End: 2022-04-21 | Stop reason: HOSPADM

## 2022-04-21 RX ADMIN — FENTANYL CITRATE 50 MCG: 0.05 INJECTION, SOLUTION INTRAMUSCULAR; INTRAVENOUS at 09:29

## 2022-04-21 RX ADMIN — MIDAZOLAM 2 MG: 1 INJECTION INTRAMUSCULAR; INTRAVENOUS at 09:24

## 2022-04-21 RX ADMIN — FENTANYL CITRATE 100 MCG: 0.05 INJECTION, SOLUTION INTRAMUSCULAR; INTRAVENOUS at 09:24

## 2022-04-21 RX ADMIN — MIDAZOLAM 1 MG: 1 INJECTION INTRAMUSCULAR; INTRAVENOUS at 09:29

## 2022-04-21 NOTE — LETTER
April 1, 2022      Lani Walker  [address] [facility] [address]        Dear Lani,     Please be aware that coverage of these services is subject to the terms and limitations of your health insurance plan.  Call member services at your health plan with any benefit or coverage questions.    Thank you for choosing [facility]. You are scheduled for the following service(s):    Date:  Thursday April 21, 2022             Procedure:  COLONOSCOPY  Doctor:        Sharif Gill MD   Arrival Time:  08:15am *Enter and check in at the Main Hospital Entrance*  Procedure Time:  09:00am      Location:   [facility]        Endoscopy Department, First Floor         [address] [place], [place] [address] [phone] or [phone] () to reschedule        MIRALAX -GATORADE  PREP  Colonoscopy is the most accurate test to detect colon polyps and colon cancer; and the only test where polyps can be removed. During this procedure, a doctor examines the lining of your large intestine and rectum through a flexible tube.   Transportation  You must arrange for a ride for the day of your procedure with a responsible adult. A taxi , Uber, etc, is not an option unless you are accompanied by a responsible adult. If you fail to arrange transportation with a responsible adult, your procedure will be cancelled and rescheduled.    Purchase the  following supplies at your local pharmacy:  - 2 (two) bisacodyl tablets: each tablet contains 5 mg.  (Dulcolax  laxative NOT Dulcolax  stool softener)   - 1 (one) 8.3 oz bottle of Polyethylene Glycol (PEG) 3350 Powder   (MiraLAX , Smooth LAX , ClearLAX  or equivalent)  - 64 oz Gatorade    Regular Gatorade, Gatorade G2 , Powerade , Powerade Zero  or Pedialyte  is acceptable. Red colored flavors are not allowed; all other colors (yellow, green, orange, purple and blue) are okay. It is also okay to buy two 2.12 oz packets of  powdered Gatorade that can be mixed with water to a total volume of 64 oz of liquid.  - 1 (one) 10 oz bottle of Magnesium Citrate (Red colored flavors are not allowed)  It is also okay for you to use a 0.5 oz package of powdered magnesium citrate (17 g) mixed with 10 oz of water.      PREPARATION FOR COLONOSCOPY    7 days before:    Discontinue fiber supplements and medications containing iron. This includes Metamucil  and Fibercon ; and multivitamins with iron.    3 days before:    Begin a low-fiber diet. A low-fiber diet helps making the cleanout more effective.     Examples of a low-fiber diet include (but are not limited to): white bread, white rice, pasta, crackers, fish, chicken, eggs, ground beef, creamy peanut butter, cooked/steamed/boiled vegetables, canned fruit, bananas, melons, milk, plain yogurt cheese, salad dressing and other condiments.     The following are not allowed on a low-fiber diet: seeds, nuts, popcorn, bran, whole wheat, corn, quinoa, raw fruits and vegetables, berries and dried fruit, beans and lentils.    For additional details on low-fiber diet, please refer to the table on the last page.    2 days before:    Continue the low-fiber diet.     Drink at least 8 glasses of water throughout the day.     Stop eating solid foods at 11:45 pm.    1 day before:    In the morning: begin a clear liquid diet (liquids you can see through).     Examples of a clear liquid diet include: water, clear broth or bouillon, Gatorade, Pedialyte or Powerade, carbonated and non-carbonated soft drinks (Sprite , 7-Up , ginger ale), strained fruit juices without pulp (apple, white grape, white cranberry), Jell-O  and popsicles.     The following are not allowed on a clear liquid diet: red liquids, alcoholic beverages, dairy products (milk, creamer, and yogurt), protein shakes, creamy broths, juice with pulp and chewing tobacco.    At noon: take 2 (two) bisacodyl tablets     At 4 (and no later than 6pm): start  drinking the Miralax-Gatorade preparation (8.3 oz of Miralax mixed with 64 oz of Gatorade in a large pitcher). Drink 1(one) 8 oz glass every 15 minutes thereafter, until the mixture is gone.    COLON CLEANSING TIPS: drink adequate amounts of fluids before and after your colon cleansing to prevent dehydration. Stay near a toilet because you will have diarrhea. Even if you are sitting on the toilet, continue to drink the cleansing solution every 15 minutes. If you feel nauseous or vomit, rinse your mouth with water, take a 15 to 30-minute-break and then continue drinking the solution. You will be uncomfortable until the stool has flushed from your colon (in about 2 to 4 hours). You may feel chilled.    Day of your procedure  You may take your morning medications including blood pressure medications, methadone, anti-seizure medications with sips of water 3 hours prior to your procedure or earlier. Do not take insulin, blood thinners (unless specifically told by your primary care provider) or vitamins prior to your procedure. Continue the clear liquid diet.       4 hours prior: drink 10 oz of magnesium citrate. It may be easier to drink it with a straw.    STOP consuming all liquids after that.     Do not take anything by mouth during this time.     Allow extra time to travel to your procedure as you may need to stop and use a restroom along the way.    You are ready for the procedure, if you followed all instructions and your stool is no longer formed, but clear or yellow liquid. If you are unsure whether your colon is clean, please call our office at [phone] before you leave for your appointment.    Bring the following to your procedure:  - Insurance Card/Photo ID.   - List of current medications including over-the-counter medications and supplements.   - Your rescue inhaler if you currently use one to control asthma.    Canceling or rescheduling your appointment:   If you must cancel or reschedule your  appointment, please call [phone] as soon as possible.      COLONOSCOPY PRE-PROCEDURE CHECKLIST    If you have diabetes, ask your regular doctor for diet and medication restrictions.  If you take an anticoagulant or anti-platelet medication (such as Coumadin , Lovenox , Pradaxa , Xarelto , Eliquis , etc.), please call your primary doctor for advice on holding this medication.  If you take aspirin you may continue to do so.  If you are or may be pregnant, please discuss the risks and benefits of this procedure with your doctor.        What happens during a colonoscopy?    Plan to spend up to two hours, starting at registration time, at the endoscopy center the day of your procedure. The colonoscopy takes an average of 15 to 30 minutes. Recovery time is about 30 minutes.      Before the exam:    You will change into a gown.    Your medical history and medication list will be reviewed with you, unless that has been done over the phone prior to the procedure.     A nurse will insert an intravenous (IV) line into your hand or arm.    The doctor will meet with you and will give you a consent form to sign.  During the exam:     Medicine will be given through the IV line to help you relax.     Your heart rate and oxygen levels will be monitored. If your blood pressure is low, you may be given fluids through the IV line.     The doctor will insert a flexible hollow tube, called a colonoscope, into your rectum. The scope will be advanced slowly through the large intestine (colon).    You may have a feeling of fullness or pressure.     If an abnormal tissue or a polyp is found, the doctor may remove it through the endoscope for closer examination, or biopsy. Tissue removal is painless    After the exam:           Any tissue samples removed during the exam will be sent to a lab for evaluation. It may take 5-7 working days for you to be notified of the results.     A nurse will provide you with complete discharge  instructions before you leave the endoscopy center. Be sure to ask the nurse for specific instructions if you take blood thinners such as Aspirin, Coumadin or Plavix.     The doctor will prepare a full report for you and for the physician who referred you for the procedure.     Your doctor will talk with you about the initial results of your exam.      Medication given during the exam will prohibit you from driving for the rest of the day.     Following the exam, you may resume your normal diet. Your first meal should be light, no greasy foods. Avoid alcohol until the next day.     You may resume your regular activities the day after the procedure.         LOW-FIBER DIET    Foods RECOMMENDED Foods to AVOID   Breads, Cereal, Rice and Pasta:   White bread, rolls, biscuits, croissant and tamiko toast.   Waffles, Yoruba toast and pancakes.   White rice, noodles, pasta, macaroni and peeled cooked potatoes.   Plain crackers and saltines.   Cooked cereals: farina, cream of rice.   Cold cereals: Puffed Rice , Rice Krispies , Corn Flakes  and Special K    Breads, Cereal, Rice and Pasta:   Breads or rolls with nuts, seeds or fruit.   Whole wheat, pumpernickel, rye breads and cornbread.   Potatoes with skin, brown or wild rice, and kasha (buckwheat).     Vegetables:   Tender cooked and canned vegetables without seeds: carrots, asparagus tips, green or wax beans, pumpkin, spinach, lima beans. Vegetables:   Raw or steamed vegetables.   Vegetables with seeds.   Sauerkraut.   Winter squash, peas, broccoli, Brussel sprouts, cabbage, onions, cauliflower, baked beans, peas and corn.   Fruits:   Strained fruit juice.   Canned fruit, except pineapple.   Ripe bananas and melon. Fruits:   Prunes and prune juice.   Raw fruits.   Dried fruits: figs, dates and raisins.   Milk/Dairy:   Milk: plain or flavored.   Yogurt, custard and ice cream.   Cheese and cottage cheese Milk/Dairy:     Meat and other proteins:   ground, well-cooked tender  beef, lamb, ham, veal, pork, fish, poultry and organ meats.   Eggs.   Peanut butter without nuts. Meat and other proteins:   Tough, fibrous meats with gristle.   Dry beans, peas and lentils.   Peanut butter with nuts.   Tofu.   Fats, Snack, Sweets, Condiments and Beverages:   Margarine, butter, oils, mayonnaise, sour cream and salad dressing, plain gravy.   Sugar, hard candy, clear jelly, honey and syrup.   Spices, cooked herbs, bouillon, broth and soups made with allowed vegetable, ketchup and mustard.   Coffee, tea and carbonated drinks.   Plain cakes, cookies and pretzels.   Gelatin, plain puddings, custard, ice cream, sherbet and popsicles. Fats, Snack, Sweets, Condiments and Beverages:   Nuts, seeds and coconut.   Jam, marmalade and preserves.   Pickles, olives, relish and horseradish.   All desserts containing nuts, seeds, dried fruit and coconut; or made from whole grains or bran.   Candy made with nuts or seeds.   Popcorn.         DIRECTIONS TO THE ENDOSCOPY DEPARTMENT    From the north ([facility])  Take [address] South, exit on [address] [address]. Get into the left hand marlyn, turn left (east), go one-half mile to [address] and turn left. Go north to the second stoplight, take a right on [address] and follow it to the Main Hospital entrance.    From the south ([facility])  Take 35N to the 35E split and exit on [address] [address]. On [address] [address], turn left (west) to [address]. Turn right (north) on [address]. Go north to the second stoplight, take a right on [address] and follow it to the Main Hospital entrance.    From the east via 35E ([facility])  Take [address]E south to [address] [address] exit. Turn right on [address] [address]. Go west to [address]. Turn right (north) on [address]. Go to the second stoplight, take a right on [address] to the Main Hospital entrance.    From the east via [address] ([facility])  Take [facility] [address]  West to [address]. Turn left (south) on [address] to [address], turn left (east) on [address] and follow it to the Main Hospital entrance.    From the west via [address] ([place])  Take [address] east to [address]. Turn right (south) on [address] to [address], turn left (east) on [address] and follow it to the Main Hospital entrance.

## 2022-04-21 NOTE — DISCHARGE INSTRUCTIONS
The patient has received a copy of the Provation  report the doctor has written and discharge instructions have been discussed with the patient and responsible adult.  All questions were addressed and answered prior to patient discharge.     Understanding Diverticulosis and Diverticulitis     Pouches or diverticula usually occur in the lower part of the colon called the sigmoid.      Diverticulitis occurs when the pouches become inflamed.     The colon (large intestine) is the last part of the digestive tract. It absorbs water from stool and changes it from a liquid to a solid. In certain cases, small pouches called diverticula can form in the colon wall. This condition is called diverticulosis. The pouches can become infected. If this happens, it becomes a more serious problem called diverticulitis. These problems can be painful. But they can be managed.   Managing Your Condition  Diet changes or taking medications are often tried first. These may be enough to bring relief. If the case is bad, surgery may be done. You and your doctor can discuss the plan that is best for you.  If You Have Diverticulosis  Diet changes are often enough to control symptoms. The main changes are adding fiber (roughage) and drinking more water. Fiber absorbs water as it travels through your colon. This helps your stool stay soft and move smoothly. Water helps this process. If needed, you may be told to take over-the-counter stool softeners. To help relieve pain, antispasmodic medications may be prescribed.  If You Have Diverticulitis  Treatment depends on how bad your symptoms are.  For mild symptoms: You may be put on a liquid diet for a short time. You may also be prescribed antibiotics. If these two steps relieve your symptoms, you may then be prescribed a high-fiber diet. If you still have symptoms, your doctor will discuss further treatment options with you.  For severe symptoms: You may need to be admitted to the hospital. There,  you can be given IV antibiotics and fluids. Once symptoms are under control, the above treatments may be tried. If these don t control your condition, your doctor may discuss the option of having surgery with you.  [place] to Colon Health  Help keep your colon healthy with a diet that includes plenty of high-fiber fruits, vegetables, and whole grains. Drink plenty of liquids like water and juice. Your doctor may also recommend avoiding seeds and nuts.          [id number] [employer] [facility], [address] [place] [address], [place] [address]. All rights reserved. This information is not intended as a substitute for professional medical care. Always follow your healthcare professional's instructions.     Eating a High-Fiber Diet  Fiber is what gives strength and structure to plants. Most grains, beans, vegetables, and fruits contain fiber. Foods rich in fiber are often low in calories and fat, and they fill you up more. They may also reduce your risks for certain health problems. To find out the amount of fiber in canned, packaged, or frozen foods, read the  Nutrition Facts  label. It tells you how much fiber is in a serving.      Types of Fiber and Their Benefits  There are two types of fiber: insoluble and soluble. They both aid digestion and help you maintain a healthy weight.  Insoluble fiber: This is found in whole grains, cereals, certain fruits and vegetables (such as apple skin, corn, and carrots). Insoluble fiber may prevent constipation and reduce the risk of certain types of cancer.   Soluble fiber: This type of fiber is in oats, beans, and certain fruits and vegetables (such as strawberries and peas). Soluble fiber can reduce cholesterol (which may help lower the risk of heart disease), and helps control blood sugar levels.  Look for High-Fiber Foods  Whole-grain breads and cereals: Try to eat 6-8 ounces a day. Include wheat and oat bran cereals, whole-wheat muffins or toast, and corn tortillas in your meals.  Fruits: Try  to eat 2 cups a day. Apples, oranges, strawberries, pears, and bananas are good sources. (Note: Fruit juice is low in fiber.)  Vegetables: Try to eat 3 cups a day. Add asparagus, carrots, broccoli, peas, and corn to your meals.  Legumes (beans): One cup of cooked lentils gives you over 15 grams of fiber. Try navy beans, lentils, and chickpeas.  Seeds:  A small handful of seeds gives you about 3 grams of fiber. Try sunflower seeds.    Keep Track of Your Fiber  A healthy diet includes 31 grams of fiber a day if you have a 2,000-calorie diet. Keep track of how much fiber you eat. Start by reading food labels. Then eat a variety of foods high in fiber. Ask your doctor about supplemental fiber products.            [id number] [employer], [address] [place] [address], [place] [address]. All rights reserved. This information is not intended as a substitute for professional medical care. Always follow your healthcare professional's instructions.

## 2022-04-21 NOTE — H&P
Pre-Endoscopy History and Physical     Lani Walker MRN# [id number]   YOB: 1960 Age: 61 year old     Date of Procedure: 4/21/2022  Primary care provider: Francine Lombardi  Type of Endoscopy: Colonoscopy with possible biopsy, possible polypectomy  Reason for Procedure: screen  Type of Anesthesia Anticipated: Conscious Sedation    HPI:    Lani is a 61 year old female who will be undergoing the above procedure.      A history and physical has been performed. The patient's medications and allergies have been reviewed. The risks and benefits of the procedure and the sedation options and risks were discussed with the patient.  All questions were answered and informed consent was obtained.      She denies a personal or family history of anesthesia complications or bleeding disorders.     Patient Active Problem List   Diagnosis     Essential hypertension, benign     CARDIOVASCULAR SCREENING; LDL GOAL LESS THAN 130     Hypercholesteremia     Mild episode of recurrent major depressive disorder (H)        Past Medical History:   Diagnosis Date     HTN (hypertension)         Past Surgical History:   Procedure Laterality Date     BREAST BIOPSY, RT/LT  2016    left benign      COLONOSCOPY  04/2017    every 5 years        Social History     Tobacco Use     Smoking status: Current Every Day Smoker     Smokeless tobacco: Never Used     Tobacco comment: 10 cigs per day   Substance Use Topics     Alcohol use: Yes     Comment: once weekly 4-5 drinks        Family History   Problem Relation Age of Onset     Alzheimer Disease Mother      Heart Surgery Father         stent     Diabetes Maternal Grandfather      Breast Cancer Maternal Aunt      Ovarian Cancer Maternal Aunt      Alzheimer Disease Maternal Aunt      Alzheimer Disease Maternal Aunt      Asthma No family hx of      Thyroid Disease No family hx of      Colon Cancer No family hx of        Prior to Admission medications    Medication Sig Start Date End  "Date Taking? Authorizing Provider   atorvastatin (LIPITOR) 40 MG tablet Take 1 tablet (40 mg) by mouth daily ### DO NOT FILL NOW.  Please update patient's profile to reflect additional refills.  #### 11/18/21  Yes Francine Lombardi APRN CNP   FLUoxetine (PROZAC) 20 MG capsule TAKE 1 CAPSULE(20 MG) BY MOUTH DAILY 3/16/22  Yes Francine oLmbardi APRN CNP   hydrochlorothiazide (HYDRODIURIL) 25 MG tablet Take 1 tablet (25 mg) by mouth daily 11/18/21  Yes Francine Lombardi APRN CNP   albuterol (PROAIR HFA/PROVENTIL HFA/VENTOLIN HFA) 108 (90 Base) MCG/ACT inhaler INHALE TWO PUFFS BY MOUTH EVERY SIX HOURS AS NEEDED FOR SHORTNESS OF BREATH/DYSPNEA OR WHEEZING 11/18/21   Francine Lombardi APRN CNP   fluconazole (DIFLUCAN) 150 MG tablet Take one dose today.  Repeat in 3 days if needed. 3/2/22   Joceline Hawley MD   metFORMIN (GLUCOPHAGE-XR) 500 MG 24 hr tablet TAKE 1 TABLET(500 MG) BY MOUTH DAILY WITH DINNER 11/18/21   Francine Lombardi APRN CNP       No Known Allergies     REVIEW OF SYSTEMS:   5 point ROS negative except as noted above in HPI, including Gen., Resp., CV, GI &  system review.    PHYSICAL EXAM:   There were no vitals taken for this visit. Estimated body mass index is 29.17 kg/m  as calculated from the following:    Height as of 11/18/21: 1.683 m (5' 6.25\").    Weight as of 3/2/22: 82.6 kg (182 lb 1.6 oz).   GENERAL APPEARANCE: alert, and oriented  MENTAL STATUS: alert  AIRWAY EXAM: Mallampatti Class I (visualization of the soft palate, fauces, uvula, anterior and posterior pillars)  RESP: lungs clear to auscultation - no rales, rhonchi or wheezes  CV: regular rates and rhythm  DIAGNOSTICS:    Not indicated    IMPRESSION   ASA Class 2 - Mild systemic disease    PLAN:   Plan for Colonoscopy with possible biopsy, possible polypectomy. We discussed the risks, benefits and alternatives and the patient wished to proceed.    The above has been forwarded to the consulting provider.      Signed " Electronically by: Sharif Gill MD  April 21, 2022

## 2022-04-27 ENCOUNTER — OFFICE VISIT (OUTPATIENT)
Dept: INTERNAL MEDICINE | Facility: CLINIC | Age: 62
End: 2022-04-27
Payer: COMMERCIAL

## 2022-04-27 VITALS
BODY MASS INDEX: 27.94 KG/M2 | TEMPERATURE: 97.9 F | WEIGHT: 178 LBS | HEART RATE: 85 BPM | OXYGEN SATURATION: 97 % | DIASTOLIC BLOOD PRESSURE: 86 MMHG | HEIGHT: 67 IN | SYSTOLIC BLOOD PRESSURE: 115 MMHG | RESPIRATION RATE: 16 BRPM

## 2022-04-27 DIAGNOSIS — J32.9 SINUSITIS, UNSPECIFIED CHRONICITY, UNSPECIFIED LOCATION: Primary | ICD-10-CM

## 2022-04-27 DIAGNOSIS — K57.30 DIVERTICULOSIS OF LARGE INTESTINE WITHOUT HEMORRHAGE: ICD-10-CM

## 2022-04-27 PROCEDURE — 99213 OFFICE O/P EST LOW 20 MIN: CPT | Performed by: NURSE PRACTITIONER

## 2022-04-27 RX ORDER — AZITHROMYCIN 250 MG/1
TABLET, FILM COATED ORAL
Qty: 6 TABLET | Refills: 0 | Status: SHIPPED | OUTPATIENT
Start: 2022-04-27 | End: 2022-04-29

## 2022-04-27 ASSESSMENT — ANXIETY QUESTIONNAIRES
7. FEELING AFRAID AS IF SOMETHING AWFUL MIGHT HAPPEN: NOT AT ALL
GAD7 TOTAL SCORE: 7
2. NOT BEING ABLE TO STOP OR CONTROL WORRYING: MORE THAN HALF THE DAYS
6. BECOMING EASILY ANNOYED OR IRRITABLE: SEVERAL DAYS
3. WORRYING TOO MUCH ABOUT DIFFERENT THINGS: MORE THAN HALF THE DAYS
5. BEING SO RESTLESS THAT IT IS HARD TO SIT STILL: NOT AT ALL
1. FEELING NERVOUS, ANXIOUS, OR ON EDGE: MORE THAN HALF THE DAYS

## 2022-04-27 ASSESSMENT — PATIENT HEALTH QUESTIONNAIRE - PHQ9
SUM OF ALL RESPONSES TO PHQ QUESTIONS 1-9: 12
5. POOR APPETITE OR OVEREATING: NOT AT ALL

## 2022-04-27 NOTE — PROGRESS NOTES
"  Assessment & Plan     Sinusitis, unspecified chronicity, unspecified location    - azithromycin (ZITHROMAX) 250 MG tablet; Two tablets first day, then one tablet daily for four days.    Diverticulosis of large intestine without hemorrhage        flonase daily  Diverticulosis handout and high fiber foods geven to patient.             Maria Luisa Alba NP  [facility] RAOUL Garibay is a 61 year old who presents for the following health issues  accompanied by her none.    HPI       ED/UC Followup:    Facility:  [facility]  Date of visit: 3-2-2022  Reason for visit: GI issues         Review of Systems   CONSTITUTIONAL: NEGATIVE for fever, chills, change in weight  ENT/MOUTH: NEGATIVE for ear, mouth and throat problems  RESP: NEGATIVE for significant cough or SOB  CV: NEGATIVE for chest pain, palpitations or peripheral edema      Objective    /86   Pulse 85   Temp 97.9  F (36.6  C) (Oral)   Resp 16   Ht 1.689 m (5' 6.5\")   Wt 80.7 kg (178 lb)   SpO2 97%   Breastfeeding No   BMI 28.30 kg/m      Physical Exam   GENERAL: healthy, alert and no distress  EYES: Eyes grossly normal to inspection, PERRL and conjunctivae and sclerae normal  HENT: ear canals and TM's normal, nose and mouth without ulcers or lesions  HENT: sinuses: maxillary tenderness on bilateral  NECK: no adenopathy, no asymmetry, masses, or scars and thyroid normal to palpation  RESP: lungs clear to auscultation - no rales, rhonchi or wheezes  CV: regular rate and rhythm, normal S1 S2, no S3 or S4, no murmur, click or rub, no peripheral edema and peripheral pulses strong                "

## 2022-04-28 ASSESSMENT — ANXIETY QUESTIONNAIRES: GAD7 TOTAL SCORE: 7

## 2022-04-29 ENCOUNTER — TELEPHONE (OUTPATIENT)
Dept: INTERNAL MEDICINE | Facility: CLINIC | Age: 62
End: 2022-04-29
Payer: COMMERCIAL

## 2022-04-29 DIAGNOSIS — J32.9 SINUSITIS, UNSPECIFIED CHRONICITY, UNSPECIFIED LOCATION: ICD-10-CM

## 2022-04-29 RX ORDER — AZITHROMYCIN 250 MG/1
TABLET, FILM COATED ORAL
Qty: 6 TABLET | Refills: 0 | Status: SHIPPED | OUTPATIENT
Start: 2022-04-29 | End: 2022-06-02

## 2022-04-29 NOTE — TELEPHONE ENCOUNTER
Patient misplaced prescription, had taken first two tabs. Please resent to selected pharmacy.  -Felicia Rodriguez

## 2022-04-29 NOTE — TELEPHONE ENCOUNTER
MILLICENT-shreya ordered by Maria Luisa Alba. Routed to covering provider to review.     Connie Newton RN  [facility]

## 2022-05-20 ENCOUNTER — NURSE TRIAGE (OUTPATIENT)
Dept: INTERNAL MEDICINE | Facility: CLINIC | Age: 62
End: 2022-05-20
Payer: COMMERCIAL

## 2022-05-20 ENCOUNTER — OFFICE VISIT (OUTPATIENT)
Dept: URGENT CARE | Facility: URGENT CARE | Age: 62
End: 2022-05-20
Payer: COMMERCIAL

## 2022-05-20 VITALS
RESPIRATION RATE: 16 BRPM | BODY MASS INDEX: 28.3 KG/M2 | OXYGEN SATURATION: 100 % | HEART RATE: 93 BPM | DIASTOLIC BLOOD PRESSURE: 80 MMHG | WEIGHT: 178 LBS | SYSTOLIC BLOOD PRESSURE: 115 MMHG | TEMPERATURE: 97.1 F

## 2022-05-20 DIAGNOSIS — K57.32 DIVERTICULITIS OF COLON: Primary | ICD-10-CM

## 2022-05-20 PROCEDURE — 99213 OFFICE O/P EST LOW 20 MIN: CPT | Performed by: PHYSICIAN ASSISTANT

## 2022-05-20 RX ORDER — CETIRIZINE HYDROCHLORIDE 10 MG/1
10 TABLET ORAL DAILY
COMMUNITY
Start: 2022-03-15 | End: 2024-01-01

## 2022-05-20 RX ORDER — METRONIDAZOLE 500 MG/1
500 TABLET ORAL 3 TIMES DAILY
Qty: 21 TABLET | Refills: 0 | Status: SHIPPED | OUTPATIENT
Start: 2022-05-20 | End: 2022-05-27

## 2022-05-20 RX ORDER — CIPROFLOXACIN 500 MG/1
500 TABLET, FILM COATED ORAL 2 TIMES DAILY
Qty: 14 TABLET | Refills: 0 | Status: SHIPPED | OUTPATIENT
Start: 2022-05-20 | End: 2022-05-27

## 2022-05-20 NOTE — PROGRESS NOTES
Patient presents with:  Abdominal Pain: Pt was here 3 weeks ago and diagnosed with diverticulitis but was given no medication. Pt is still having pain     (K57.32) Diverticulitis of colon  (primary encounter diagnosis)  Comment:   Plan: metroNIDAZOLE (FLAGYL) 500 MG tablet,         ciprofloxacin (CIPRO) 500 MG tablet, Adult         Gastro Ref - Consult Only          Differential Diagnosis:  Abdominal Pain: Diverticular Disease and Diverticulitis    If not improving or if condition worsens, follow up with your Primary Care Provider.  Referred to GI.  See handouts on diverticulitis      SUBJECTIVE:   Lani Walker is a 61 year old female who presents today with intermittent left lower quadrant pain for the past few weeks.  Had a colonoscopy which revealed diverticulosis at the time.  She has been following a diverticulosis diet without any relief, and yesterday the pain flared in the LLQ.  No fevers.        Past Medical History:   Diagnosis Date     HTN (hypertension)          Current Outpatient Medications   Medication Sig Dispense Refill     Multiple Vitamins-Iron (DAILY-KORIN/IRON/BETA-CAROTENE) TABS TAKE 1 TABLET BY MOUTH DAILY. (Patient not taking: Reported on 10/19/2020) 30 tablet 7     Social History     Tobacco Use     Smoking status: Never Smoker     Smokeless tobacco: Never Used   Substance Use Topics     Alcohol use: Not on file     Family History   Problem Relation Age of Onset     Diabetes Mother      Diabetes Father          ROS:    10 point ROS of systems including Constitutional, Eyes, Respiratory, Cardiovascular, Gastroenterology, Genitourinary, Integumentary, Muscularskeletal, Psychiatric ,neurological were all negative except for pertinent positives noted in my HPI       OBJECTIVE:  /80   Pulse 93   Temp 97.1  F (36.2  C) (Tympanic)   Resp 16   Wt 80.7 kg (178 lb)   SpO2 100%   BMI 28.30 kg/m    Physical Exam:  GENERAL APPEARANCE: healthy, alert and no distress  RESP: lungs clear to  auscultation - no rales, rhonchi or wheezes  CV: regular rates and rhythm, normal S1 S2, no murmur noted  ABDOMEN:  soft, with left lower quadrant tenderness, no rebound.  No HSM or masses and bowel sounds  hyperactive   SKIN: no suspicious lesions or rashes        Colonoscopy 4/21/22  Findings:        The perianal and digital rectal examinations were normal.        A few small and large-mouthed diverticula were found in the sigmoid        colon.        The exam was otherwise without abnormality on direct and retroflexion        views.                                                                                     Impression:               - Diverticulosis in the sigmoid colon.                             - The examination was otherwise normal on direct                             and retroflexion views.                             - No specimens collected.   Recommendation:           - Repeat colonoscopy in 5 years for surveillance.

## 2022-05-20 NOTE — TELEPHONE ENCOUNTER
"Called patient  S-(situation): abdominal pain worsening over past 2 months    B-(background): pain in lower left and mid abdomen.  Stated is constipated and having gas pains that are shooting into her back.      A-(assessment): denied fever, diarrhea, urine problems, chest pain, difficulty breathing.    Stated has been eating a mainly liquid diet as eating solid food worsens her symptoms.  States she has not been able to get anything done today because of the pain.  Has vomited x 2 in past 6 days. Has had similar pain in the past and has had abdominal radiological exams with no acute issues.  Patient had a colonoscopy and was told she has diverticulosis.  Stated she felt really good after the GI doctor did the colonoscopy and cleaned out the diverticuli.      R-(recommendations): protocol states to go to the Er.  Patient verbalized understanding.  Stated she has an upcoming appointment.      Next 5 appointments (look out 90 days)    May 23, 2022  9:30 AM  (Arrive by 9:10 AM)  Provider Visit with Tiffanie Campos PA-C  [facility] ([facility] ) [address] [facility] [place] [facility] [address] [phone]       Additional Information    Negative: Passed out (i.e., fainted, collapsed and was not responding)    Negative: Shock suspected (e.g., cold/pale/clammy skin, too weak to stand, low BP, rapid pulse)    Negative: Sounds like a life-threatening emergency to the triager    Negative: Chest pain    Negative: Pain is mainly in upper abdomen (if needed ask: 'is it mainly above the belly button?')    Negative: Abdominal pain and pregnant > 20 weeks    Negative: Abdominal pain and pregnant < 20 weeks    Answer Assessment - Initial Assessment Questions  1. LOCATION: \"Where does it hurt?\"       Mid stomach and left side  2. RADIATION: \"Does the pain shoot anywhere else?\" (e.g., chest, back)      Has gas pains shooting up her back  3. ONSET: \"When did the pain begin?\" " "(e.g., minutes, hours or days ago)       3 weeks ago  4. SUDDEN: \"Gradual or sudden onset?\"      gradually  5. PATTERN \"Does the pain come and go, or is it constant?\"     - If constant: \"Is it getting better, staying the same, or worsening?\"       (Note: Constant means the pain never goes away completely; most serious pain is constant and it progresses)      - If intermittent: \"How long does it last?\" \"Do you have pain now?\"      (Note: Intermittent means the pain goes away completely between bouts)      Pain worse for the past 3 days  6. SEVERITY: \"How bad is the pain?\"  (e.g., Scale 1-10; mild, moderate, or severe)    - MILD (1-3): doesn't interfere with normal activities, abdomen soft and not tender to touch     - MODERATE (4-7): interferes with normal activities or awakens from sleep, tender to touch     - SEVERE (8-10): excruciating pain, doubled over, unable to do any normal activities       Moderate   7. RECURRENT SYMPTOM: \"Have you ever had this type of abdominal pain before?\" If so, ask: \"When was the last time?\" and \"What happened that time?\"       Yes, has had an ultrasound in the past and showed no acute issues  8. CAUSE: \"What do you think is causing the abdominal pain?\"      Gas and wondering if she is having diverticulitis  9. RELIEVING/AGGRAVATING FACTORS: \"What makes it better or worse?\" (e.g., movement, antacids, bowel movement)      Eating normal foods makes it worse. Needs to be more of a liquid diet  10. OTHER SYMPTOMS: \"Has there been any vomiting, diarrhea, constipation, or urine problems?\"        Constipated.  Vomited twice in past 6 days  11. PREGNANCY: \"Is there any chance you are pregnant?\" \"When was your last menstrual period?\"        no    Protocols used: ABDOMINAL PAIN - FEMALE-A-OH      "

## 2022-05-20 NOTE — PATIENT INSTRUCTIONS
(K57.32) Diverticulitis of colon  (primary encounter diagnosis)  Comment: with diverticulosis on recent colonoscopy, but now with increased LLQ pain.    Plan: metroNIDAZOLE (FLAGYL) 500 MG tablet,         ciprofloxacin (CIPRO) 500 MG tablet, Adult         Gastro Ref - Consult Only

## 2022-05-20 NOTE — TELEPHONE ENCOUNTER
Patient c/o diverticulitis pain. She is in extreme pain she said she feels like she has food postioning but it isnt.    Diet is  slimfast and soup     Best number to call back [phone]    [facility] [address]

## 2022-05-23 ENCOUNTER — OFFICE VISIT (OUTPATIENT)
Dept: INTERNAL MEDICINE | Facility: CLINIC | Age: 62
End: 2022-05-23
Payer: COMMERCIAL

## 2022-05-23 VITALS
BODY MASS INDEX: 27.25 KG/M2 | OXYGEN SATURATION: 95 % | WEIGHT: 173.6 LBS | TEMPERATURE: 97.8 F | DIASTOLIC BLOOD PRESSURE: 85 MMHG | HEIGHT: 67 IN | HEART RATE: 109 BPM | SYSTOLIC BLOOD PRESSURE: 111 MMHG | RESPIRATION RATE: 18 BRPM

## 2022-05-23 DIAGNOSIS — R10.32 LLQ ABDOMINAL PAIN: Primary | ICD-10-CM

## 2022-05-23 PROCEDURE — 99213 OFFICE O/P EST LOW 20 MIN: CPT | Performed by: PHYSICIAN ASSISTANT

## 2022-05-23 RX ORDER — ASCORBIC ACID 100 MG
TABLET,CHEWABLE ORAL
COMMUNITY
End: 2024-01-01

## 2022-05-23 NOTE — NURSING NOTE
"/85 (BP Location: Left arm, Patient Position: Sitting, Cuff Size: Adult Large)   Pulse 109   Temp 97.8  F (36.6  C) (Oral)   Resp 18   Ht 1.689 m (5' 6.5\")   Wt 78.7 kg (173 lb 9.6 oz)   SpO2 95%   BMI 27.60 kg/m      "

## 2022-05-23 NOTE — PROGRESS NOTES
"  Assessment & Plan     LLQ abdominal pain  Possible diverticulitis. Improving with Flagyl and Cipro. Recommend finishing both of these antibiotics as directed. No evidence of complications on today's exam. Advise follow-up if she develops worsening pain, fevers, etc. For constipation, discussed fiber, fluids, and exercise. Discussed that fiber is also helpful for diverticulosis. If she has another episode of pain in the future, consider CT for confirmation of diverticulitis. She is also scheduled with OBGYN, as there is a FH of ovarian cancer.    15 minutes spent on the date of the encounter doing chart review, history and exam, documentation and further activities per the note       No follow-ups on file.    Tiffanie Campos PA-C  [facility] RAOUL Garibay is a 61 year old who presents for the following health issues     HPI     ED/UC Followup:    Facility:  [facility]   Date of visit: 05/20/2022  Reason for visit: Abdominal Pain   Current Status: Improved     Had US in [place]. Was put on Cipro for 7 days.  Then came back to [place] and had a colonoscopy in April, which showed diverticulosis  Pain then recurred, prompting UC visit on 5/20/22  She was started on Flagyl and Cipro  Has been tolerating the medications well  Pain has improved quite a bit  Is having some constipation  Has had some vomiting episodes  No fevers or chills  Having some R low back pain    Started abx Sat AM    Has not had a CT scan    Review of Systems   Constitutional, HEENT, cardiovascular, pulmonary, gi and gu systems are negative, except as otherwise noted.      Objective    /85 (BP Location: Left arm, Patient Position: Sitting, Cuff Size: Adult Large)   Pulse 109   Temp 97.8  F (36.6  C) (Oral)   Resp 18   Ht 1.689 m (5' 6.5\")   Wt 78.7 kg (173 lb 9.6 oz)   SpO2 95%   BMI 27.60 kg/m    Body mass index is 27.6 kg/m .  Physical Exam   GENERAL: healthy, alert and no " distress  RESP: lungs clear to auscultation - no rales, rhonchi or wheezes  CV: regular rate and rhythm, normal S1 S2, no S3 or S4, no murmur, click or rub, no peripheral edema and peripheral pulses strong  ABDOMEN: bowel sounds normal and mild LLQ tenderness  MS: no gross musculoskeletal defects noted, no edema  SKIN: no suspicious lesions or rashes  PSYCH: mentation appears normal, affect normal/bright

## 2022-05-24 ENCOUNTER — TELEPHONE (OUTPATIENT)
Dept: GASTROENTEROLOGY | Facility: CLINIC | Age: 62
End: 2022-05-24
Payer: COMMERCIAL

## 2022-05-24 NOTE — TELEPHONE ENCOUNTER
[facility] Call Center    Phone Message    May a detailed message be left on voicemail: yes     Reason for Call: Appointment Intake    Referring Provider Name: Jyoti Flores PA-C  Diagnosis and/or Symptoms: Diverticulitis of colon [K57.32]     Patient is being referred for Diverticulitis and has lost 8lbs within 2 months. Please review per scheduling guidelines. Thanks!     Action Taken: Message routed to:  [facility] GI    Travel Screening: Not Applicable

## 2022-05-24 NOTE — TELEPHONE ENCOUNTER
Patient chart will be reviewed by GI clinic management. Appointment urgency will be determined  based on chart review. Patient will be contacted if an appointment change is necessary after chart review.

## 2022-05-27 ENCOUNTER — TELEPHONE (OUTPATIENT)
Dept: INTERNAL MEDICINE | Facility: CLINIC | Age: 62
End: 2022-05-27
Payer: COMMERCIAL

## 2022-05-27 NOTE — TELEPHONE ENCOUNTER
Reason for Call:  Other appointment    Detailed comments: Pt called to schedule Diverticulitis follow up with either pcp or different provider at [place] clinic, no openings with any provider until 6/20/22. Scheduled pt at [facility] on 6/3/22. Pt would like call back if able to see pcp or a different provider within [place] next week.     Phone Number Patient can be reached at: Cell number on file:    Telephone Information:   Mobile [phone]       Best Time: na    Can we leave a detailed message on this number? Not Applicable    Call taken on 5/27/2022 at 8:23 AM by Larissa Tucker

## 2022-05-27 NOTE — TELEPHONE ENCOUNTER
Francine - you have a same day hold on 6/2. Can this be used for diverticulitis follow-up or should she keep appointment as currently scheduled.     Appointments in Next Year    Jun 03, 2022  8:30 AM  (Arrive by 8:10 AM)  Provider Visit with Hardy Arevalo MD  [facility] ([facility] ) [phone]   Jun 23, 2022  2:15 PM  Office Visit with Elizabeth Hansen DO  [facility] ([facility] ) [phone]   Rony 10, 2023  8:00 AM  New Visit with Harvey Holden MD  [facility] [place] ([facility] ) [phone]         Connie Newton RN  [facility]

## 2022-05-27 NOTE — TELEPHONE ENCOUNTER
You can use same day - at this point I am ok with same days being used when needed   Just prefer not to use virtual

## 2022-05-27 NOTE — TELEPHONE ENCOUNTER
"Attempted to contact patient. Left voice message to call back.     Patient could be scheduled on Francine's \"same day\" hold on 6/2 if still available.     Connie Newton RN  [facility]   "

## 2022-06-01 ENCOUNTER — TELEPHONE (OUTPATIENT)
Dept: INTERNAL MEDICINE | Facility: CLINIC | Age: 62
End: 2022-06-01
Payer: COMMERCIAL

## 2022-06-02 ENCOUNTER — TELEPHONE (OUTPATIENT)
Dept: INTERNAL MEDICINE | Facility: CLINIC | Age: 62
End: 2022-06-02

## 2022-06-02 ENCOUNTER — HOSPITAL ENCOUNTER (OUTPATIENT)
Dept: CT IMAGING | Facility: CLINIC | Age: 62
Discharge: HOME | End: 2022-06-02
Attending: NURSE PRACTITIONER | Admitting: NURSE PRACTITIONER
Payer: COMMERCIAL

## 2022-06-02 ENCOUNTER — OFFICE VISIT (OUTPATIENT)
Dept: INTERNAL MEDICINE | Facility: CLINIC | Age: 62
End: 2022-06-02
Payer: COMMERCIAL

## 2022-06-02 VITALS
HEART RATE: 95 BPM | SYSTOLIC BLOOD PRESSURE: 96 MMHG | WEIGHT: 168.8 LBS | TEMPERATURE: 97.8 F | RESPIRATION RATE: 16 BRPM | HEIGHT: 67 IN | OXYGEN SATURATION: 100 % | BODY MASS INDEX: 26.49 KG/M2 | DIASTOLIC BLOOD PRESSURE: 73 MMHG

## 2022-06-02 DIAGNOSIS — E78.00 HYPERCHOLESTEREMIA: ICD-10-CM

## 2022-06-02 DIAGNOSIS — R73.09 ELEVATED HEMOGLOBIN A1C: ICD-10-CM

## 2022-06-02 DIAGNOSIS — F33.0 MILD EPISODE OF RECURRENT MAJOR DEPRESSIVE DISORDER (H): ICD-10-CM

## 2022-06-02 DIAGNOSIS — R11.2 NAUSEA AND VOMITING, INTRACTABILITY OF VOMITING NOT SPECIFIED, UNSPECIFIED VOMITING TYPE: ICD-10-CM

## 2022-06-02 DIAGNOSIS — C78.7 LIVER METASTASES: ICD-10-CM

## 2022-06-02 DIAGNOSIS — K57.32 DIVERTICULITIS OF COLON: ICD-10-CM

## 2022-06-02 DIAGNOSIS — C80.0 CARCINOMATOSIS (H): ICD-10-CM

## 2022-06-02 DIAGNOSIS — I10 ESSENTIAL HYPERTENSION, BENIGN: ICD-10-CM

## 2022-06-02 DIAGNOSIS — R10.84 ABDOMINAL PAIN, GENERALIZED: ICD-10-CM

## 2022-06-02 DIAGNOSIS — R10.32 LLQ ABDOMINAL PAIN: Primary | ICD-10-CM

## 2022-06-02 DIAGNOSIS — C25.9 MALIGNANT NEOPLASM OF PANCREAS, UNSPECIFIED LOCATION OF MALIGNANCY (H): ICD-10-CM

## 2022-06-02 LAB
BASOPHILS # BLD AUTO: 0 10E3/UL (ref 0–0.2)
BASOPHILS NFR BLD AUTO: 0 %
EOSINOPHIL # BLD AUTO: 0.1 10E3/UL (ref 0–0.7)
EOSINOPHIL NFR BLD AUTO: 1 %
ERYTHROCYTE [DISTWIDTH] IN BLOOD BY AUTOMATED COUNT: 12.3 % (ref 10–15)
HBA1C MFR BLD: 6 % (ref 0–5.6)
HCT VFR BLD AUTO: 36.7 % (ref 35–47)
HGB BLD-MCNC: 12.8 G/DL (ref 11.7–15.7)
IMM GRANULOCYTES # BLD: 0 10E3/UL
IMM GRANULOCYTES NFR BLD: 0 %
LYMPHOCYTES # BLD AUTO: 1.2 10E3/UL (ref 0.8–5.3)
LYMPHOCYTES NFR BLD AUTO: 16 %
MCH RBC QN AUTO: 31.8 PG (ref 26.5–33)
MCHC RBC AUTO-ENTMCNC: 34.9 G/DL (ref 31.5–36.5)
MCV RBC AUTO: 91 FL (ref 78–100)
MONOCYTES # BLD AUTO: 0.8 10E3/UL (ref 0–1.3)
MONOCYTES NFR BLD AUTO: 11 %
NEUTROPHILS # BLD AUTO: 5.1 10E3/UL (ref 1.6–8.3)
NEUTROPHILS NFR BLD AUTO: 71 %
PLATELET # BLD AUTO: 288 10E3/UL (ref 150–450)
RADIOLOGIST FLAGS: ABNORMAL
RBC # BLD AUTO: 4.03 10E6/UL (ref 3.8–5.2)
WBC # BLD AUTO: 7.2 10E3/UL (ref 4–11)

## 2022-06-02 PROCEDURE — 83036 HEMOGLOBIN GLYCOSYLATED A1C: CPT | Performed by: NURSE PRACTITIONER

## 2022-06-02 PROCEDURE — 250N000011 HC RX IP 250 OP 636: Performed by: NURSE PRACTITIONER

## 2022-06-02 PROCEDURE — 250N000009 HC RX 250: Performed by: NURSE PRACTITIONER

## 2022-06-02 PROCEDURE — 74177 CT ABD & PELVIS W/CONTRAST: CPT

## 2022-06-02 PROCEDURE — 36415 COLL VENOUS BLD VENIPUNCTURE: CPT | Performed by: NURSE PRACTITIONER

## 2022-06-02 PROCEDURE — 80050 GENERAL HEALTH PANEL: CPT | Performed by: NURSE PRACTITIONER

## 2022-06-02 PROCEDURE — 80061 LIPID PANEL: CPT | Performed by: NURSE PRACTITIONER

## 2022-06-02 PROCEDURE — 99214 OFFICE O/P EST MOD 30 MIN: CPT | Performed by: NURSE PRACTITIONER

## 2022-06-02 RX ORDER — IOPAMIDOL 755 MG/ML
500 INJECTION, SOLUTION INTRAVASCULAR ONCE
Status: COMPLETED | OUTPATIENT
Start: 2022-06-02 | End: 2022-06-02

## 2022-06-02 RX ADMIN — IOPAMIDOL 84 ML: 755 INJECTION, SOLUTION INTRAVENOUS at 13:34

## 2022-06-02 RX ADMIN — SODIUM CHLORIDE 51 ML: 9 INJECTION, SOLUTION INTRAVENOUS at 13:34

## 2022-06-02 ASSESSMENT — ENCOUNTER SYMPTOMS: ABDOMINAL PAIN: 1

## 2022-06-02 NOTE — TELEPHONE ENCOUNTER
Pt on my scheduled for Friday 6/3/22. Review of chart shoes that appt was made  5/27/22 when pt could not get in to see per PCP Dr Lombardi. TE from that day states:    Detailed comments: Pt called to schedule Diverticulitis follow up with either pcp or different provider at [place] clinic, no openings with any provider until 6/20/22. Scheduled pt at [facility] on 6/3/22. Pt would like call back if able to see pcp or a different provider within [place] next week    Pt was then called back [facility] staff after speaking with Dr Lombardi and pt scheduled to see her instead tomorrow. Therefore cancelling the appt with me since it is not necessary and pt seeing PCP instead which is most appropriate and as requested  By pt. Tried to call pt. NA. LM that I was cancelling the scheduled appt with me,  so that other acute need  Pt could be placed in that slot for hospital follow-up

## 2022-06-02 NOTE — PROGRESS NOTES
Assessment & Plan     Q abdominal pain  Was treated for diverticulitis - done with medication for a week   First normal stool yesterday - able to keep solid food down last week   Was nausea and vomiting when she was constipation     Nausea and vomiting, intractability of vomiting not specified, unspecified vomiting type  With constipation   - CT Abdomen Pelvis w Contrast    Abdominal pain, generalized    - CT Abdomen Pelvis w Contrast    Mild episode of recurrent major depressive disorder (H)  Stable     Hypercholesteremia    - Lipid panel reflex to direct LDL Fasting; Future  - Comprehensive metabolic panel (BMP + Alb, Alk Phos, ALT, AST, Total. Bili, TP); Future  - Lipid panel reflex to direct LDL Fasting  - Comprehensive metabolic panel (BMP + Alb, Alk Phos, ALT, AST, Total. Bili, TP)    Essential hypertension, benign  Stop hydrochlorothiazide   Weight loss and dehydrated   - Lipid panel reflex to direct LDL Fasting; Future  - Comprehensive metabolic panel (BMP + Alb, Alk Phos, ALT, AST, Total. Bili, TP); Future  - CBC with platelets and differential; Future  - TSH with free T4 reflex; Future  - Lipid panel reflex to direct LDL Fasting  - Comprehensive metabolic panel (BMP + Alb, Alk Phos, ALT, AST, Total. Bili, TP)  - CBC with platelets and differential  - TSH with free T4 reflex    Elevated hemoglobin A1c  She stopped metformin for 2 month   - Comprehensive metabolic panel (BMP + Alb, Alk Phos, ALT, AST, Total. Bili, TP); Future  - Hemoglobin A1c; Future  - Comprehensive metabolic panel (BMP + Alb, Alk Phos, ALT, AST, Total. Bili, TP)  - Hemoglobin A1c    Diverticulitis of colon  Treated but still a little pain   - CT Abdomen Pelvis w Contrast      37 minutes spent on the date of the encounter doing chart review, history and exam, documentation and further activities per the note       BMI:   Estimated body mass index is 26.84 kg/m  as calculated from the following:    Height as of this encounter: 1.689 m  "(5' 6.5\").    Weight as of this encounter: 76.6 kg (168 lb 12.8 oz).       Patient Instructions   Lab in [address]    Stop metformin     Stop hydrochlorothiazide     CT scan       Return in about 1 year (around 6/2/2023).    MEHREEN Puckett CNP  [facility] RAOUL Garibay is a 61 year old who presents for the following health issues:    Abdominal pain, increased gas,decreased appetite, weight loss & constipation for one month. Colonoscopy 4-2022. Has GYN appointment 6-2022 and GI appointment 1-2023.    Antibiotics completed about a week ago    Stomach pain persists   Lost 18 pounds since last October   Liquid diet because of her stomach   Last night able to eat a grilled cheese sandwhich   Yesterday had a normal bowel movement     Wt Readings from Last 4 Encounters:   06/02/22 76.6 kg (168 lb 12.8 oz)   05/23/22 78.7 kg (173 lb 9.6 oz)   05/20/22 80.7 kg (178 lb)   04/27/22 80.7 kg (178 lb)         Colonoscopy ok     Stopped smoking except 4 cigs a day      Every few days she is throwing up some food  Gastro 1/2023  Gyn in June     Quit drinking diet coke and quit chocolate, mexican food     Not taking metformin for 2 months time     yesterday  Last ate 530 pm   Drank 9 pm     Abdominal Pain        Review of Systems   Gastrointestinal: Positive for abdominal pain.      Constitutional, HEENT, cardiovascular, pulmonary, GI, , musculoskeletal, neuro, skin, endocrine and psych systems are negative, except as otherwise noted.      Objective    BP 96/73   Pulse 95   Temp 97.8  F (36.6  C) (Oral)   Resp 16   Ht 1.689 m (5' 6.5\")   Wt 76.6 kg (168 lb 12.8 oz)   SpO2 100%   Breastfeeding No   BMI 26.84 kg/m    Body mass index is 26.84 kg/m .  Physical Exam   GENERAL:alert and no distress  RESP: lungs clear to auscultation - no rales, rhonchi or wheezes  CV: regular rate and rhythm, normal S1 S2, no S3 or S4, no murmur, click or rub, no peripheral edema and peripheral " pulses strong  ABDOMEN: soft non tender in upper abdneomen   Slight tender LLQ- no rebound   MS: no gross musculoskeletal defects noted, no edema  PSYCH: mentation appears normal, affect normal/bright    Lab   CT   IMPRESSION:   1. There is a large mass in the pancreas involving the portal vein,  splenic vein, superior mesenteric vein, celiac axis, splenic artery,  and left renal artery. The pancreatic mass appears to directly invade  the left adrenal gland. There are liver metastases along with ascites  and peritoneal carcinomatosis.  2. Sigmoid diverticulosis. No definite diverticulitis. Abnormal  portion of the sigmoid colon has the appearance of a metastatic  deposit on the surface of the sigmoid colon.      [Access Center: Probable pancreatic adenocarcinoma with local  invasiveness and metastatic disease.]    Called to patient and oncology to call and make appointment in next few days   HOOD Durbin was communicated with

## 2022-06-02 NOTE — TELEPHONE ENCOUNTER
Toño from radiology calling to report a significant finding.    Noted a probable pancreatic endocarcinoma with local invasiveness and metastatic disease.

## 2022-06-03 ENCOUNTER — TRANSFERRED RECORDS (OUTPATIENT)
Dept: HEALTH INFORMATION MANAGEMENT | Facility: CLINIC | Age: 62
End: 2022-06-03
Payer: COMMERCIAL

## 2022-06-03 LAB
ALBUMIN SERPL-MCNC: 4 G/DL (ref 3.4–5)
ALP SERPL-CCNC: 405 U/L (ref 40–150)
ALT SERPL W P-5'-P-CCNC: 152 U/L (ref 0–50)
ANION GAP SERPL CALCULATED.3IONS-SCNC: 3 MMOL/L (ref 3–14)
AST SERPL W P-5'-P-CCNC: 93 U/L (ref 0–45)
BILIRUB SERPL-MCNC: 1 MG/DL (ref 0.2–1.3)
BUN SERPL-MCNC: 9 MG/DL (ref 7–30)
CALCIUM SERPL-MCNC: 9.7 MG/DL (ref 8.5–10.1)
CHLORIDE BLD-SCNC: 97 MMOL/L (ref 94–109)
CHOLEST SERPL-MCNC: 212 MG/DL
CO2 SERPL-SCNC: 30 MMOL/L (ref 20–32)
CREAT SERPL-MCNC: 0.8 MG/DL (ref 0.52–1.04)
FASTING STATUS PATIENT QL REPORTED: YES
GFR SERPL CREATININE-BSD FRML MDRD: 84 ML/MIN/1.73M2
GLUCOSE BLD-MCNC: 131 MG/DL (ref 70–99)
HDLC SERPL-MCNC: 57 MG/DL
LDLC SERPL CALC-MCNC: 131 MG/DL
NONHDLC SERPL-MCNC: 155 MG/DL
POTASSIUM BLD-SCNC: 3.9 MMOL/L (ref 3.4–5.3)
PROT SERPL-MCNC: 7.7 G/DL (ref 6.8–8.8)
SODIUM SERPL-SCNC: 130 MMOL/L (ref 133–144)
TRIGL SERPL-MCNC: 121 MG/DL
TSH SERPL DL<=0.005 MIU/L-ACNC: 3.32 MU/L (ref 0.4–4)

## 2022-06-06 ENCOUNTER — TELEPHONE (OUTPATIENT)
Dept: INTERVENTIONAL RADIOLOGY/VASCULAR | Facility: CLINIC | Age: 62
End: 2022-06-06
Payer: COMMERCIAL

## 2022-06-06 NOTE — TELEPHONE ENCOUNTER
Order for liver biopsy reviewed and approved per Dr. Johnson with ultrasound guidance and conscious sedation. Patient is on no blood thinning medications.

## 2022-06-08 ENCOUNTER — HOSPITAL ENCOUNTER (OUTPATIENT)
Dept: ULTRASOUND IMAGING | Facility: CLINIC | Age: 62
Discharge: HOME | End: 2022-06-08
Attending: INTERNAL MEDICINE | Admitting: INTERNAL MEDICINE
Payer: COMMERCIAL

## 2022-06-08 VITALS
SYSTOLIC BLOOD PRESSURE: 145 MMHG | RESPIRATION RATE: 18 BRPM | DIASTOLIC BLOOD PRESSURE: 88 MMHG | OXYGEN SATURATION: 98 % | HEART RATE: 77 BPM

## 2022-06-08 DIAGNOSIS — C25.9 MALIGNANT NEOPLASM OF PANCREAS, UNSPECIFIED LOCATION OF MALIGNANCY (H): ICD-10-CM

## 2022-06-08 LAB — INR PPP: 1.02 (ref 0.85–1.15)

## 2022-06-08 PROCEDURE — 88307 TISSUE EXAM BY PATHOLOGIST: CPT | Mod: TC | Performed by: INTERNAL MEDICINE

## 2022-06-08 PROCEDURE — 250N000011 HC RX IP 250 OP 636: Performed by: NURSE PRACTITIONER

## 2022-06-08 PROCEDURE — 36415 COLL VENOUS BLD VENIPUNCTURE: CPT | Performed by: NURSE PRACTITIONER

## 2022-06-08 PROCEDURE — 88342 IMHCHEM/IMCYTCHM 1ST ANTB: CPT | Mod: TC | Performed by: INTERNAL MEDICINE

## 2022-06-08 PROCEDURE — 88341 IMHCHEM/IMCYTCHM EA ADD ANTB: CPT | Mod: 26 | Performed by: PATHOLOGY

## 2022-06-08 PROCEDURE — 76942 ECHO GUIDE FOR BIOPSY: CPT

## 2022-06-08 PROCEDURE — 88342 IMHCHEM/IMCYTCHM 1ST ANTB: CPT | Mod: 26 | Performed by: PATHOLOGY

## 2022-06-08 PROCEDURE — 85610 PROTHROMBIN TIME: CPT | Performed by: NURSE PRACTITIONER

## 2022-06-08 PROCEDURE — 88307 TISSUE EXAM BY PATHOLOGIST: CPT | Mod: 26 | Performed by: PATHOLOGY

## 2022-06-08 RX ORDER — NALOXONE HYDROCHLORIDE 0.4 MG/ML
0.2 INJECTION, SOLUTION INTRAMUSCULAR; INTRAVENOUS; SUBCUTANEOUS
Status: DISCONTINUED | OUTPATIENT
Start: 2022-06-08 | End: 2022-06-09 | Stop reason: HOSPADM

## 2022-06-08 RX ORDER — FLUMAZENIL 0.1 MG/ML
0.2 INJECTION, SOLUTION INTRAVENOUS
Status: DISCONTINUED | OUTPATIENT
Start: 2022-06-08 | End: 2022-06-09 | Stop reason: HOSPADM

## 2022-06-08 RX ORDER — ACETAMINOPHEN 325 MG/1
650 TABLET ORAL EVERY 4 HOURS PRN
Status: DISCONTINUED | OUTPATIENT
Start: 2022-06-08 | End: 2022-06-09 | Stop reason: HOSPADM

## 2022-06-08 RX ORDER — NALOXONE HYDROCHLORIDE 0.4 MG/ML
0.4 INJECTION, SOLUTION INTRAMUSCULAR; INTRAVENOUS; SUBCUTANEOUS
Status: DISCONTINUED | OUTPATIENT
Start: 2022-06-08 | End: 2022-06-09 | Stop reason: HOSPADM

## 2022-06-08 RX ORDER — FENTANYL CITRATE 50 UG/ML
25-50 INJECTION, SOLUTION INTRAMUSCULAR; INTRAVENOUS EVERY 5 MIN PRN
Status: DISCONTINUED | OUTPATIENT
Start: 2022-06-08 | End: 2022-06-09 | Stop reason: HOSPADM

## 2022-06-08 RX ADMIN — MIDAZOLAM 2 MG: 1 INJECTION INTRAMUSCULAR; INTRAVENOUS at 10:37

## 2022-06-08 RX ADMIN — FENTANYL CITRATE 100 MCG: 0.05 INJECTION, SOLUTION INTRAMUSCULAR; INTRAVENOUS at 10:35

## 2022-06-08 NOTE — PROGRESS NOTES
Patient discharged to home following liver biopsy, all discharge criteria were met and patient discharged via wheelchair in stable condition with daughter.

## 2022-06-08 NOTE — IR NOTE
US BIOPSY LIVER  6/8/2022 10:55 AM     HISTORY:  Malignant neoplasm of pancreas, unspecified location of malignancy (H)    TECHNIQUE:  After obtaining informed consent, the patient was placed in a supine position on the ultrasound table. The skin overlying the area of biopsy was prepped and draped in the usual sterile manner. 1% lidocaine was injected for local anesthesia. Under ultrasound guidance, using a 17/18 gauge coaxial biopsy needle system, 4 cores were obtained from a hypoechoic lesion in the right lobe of liver and submitted to pathology. The biopsy tract was sealed with a Gelfoam slurry.    I determined this patient to be an appropriate candidate for the planned sedation and procedure and reassessed the patient immediately prior to sedation and procedure. Moderate intravenous conscious sedation was supervised by me. The patient was independently monitored by a registered nurse assigned to the Department of radiology using automated blood pressure, EKG and pulse oximetry. The patient tolerated the procedure well. There were no immediate postprocedure complications. The patient's vital signs were monitored by radiology nursing staff under my supervision and remained stable throughout the study.    Sedation time: 2 mg Versed, 100 mcg fentanyl    Sedation time: 30 minutes    IMPRESSION: Ultrasound guided biopsy performed as described above.

## 2022-06-08 NOTE — PROGRESS NOTES
Ultrasound guided liver biopsy completed per Dr. Vivas without difficulty, patient tolerated well. Fentanyl 100 mcg versed 2 mg total given. To holding room for recovery phase of care per radiology protocol.

## 2022-06-10 ENCOUNTER — TRANSFERRED RECORDS (OUTPATIENT)
Dept: HEALTH INFORMATION MANAGEMENT | Facility: CLINIC | Age: 62
End: 2022-06-10

## 2022-06-13 LAB
PATH REPORT.COMMENTS IMP SPEC: ABNORMAL
PATH REPORT.COMMENTS IMP SPEC: YES
PATH REPORT.FINAL DX SPEC: ABNORMAL
PATH REPORT.GROSS SPEC: ABNORMAL
PATH REPORT.MICROSCOPIC SPEC OTHER STN: ABNORMAL
PATH REPORT.RELEVANT HX SPEC: ABNORMAL
PHOTO IMAGE: ABNORMAL

## 2022-06-13 NOTE — PLAN OF CARE
Ordering Clinic:  [facility] procedure #:  Procedure: Port Placement  Reason for procedure: primary pancreatic cancer  Arrival date: 6/15/2022  Arrival time: 0900  Covid-19 testing requirements explained: Y - pt will do home test  NPO explained: Y                 Does patient have transportation available pre and post procedure?   Y  Check-in procedure explained: Y  Allergies reviewed: NKA  Blood thinners: NA  Labs: per orders    Results:___________________________  H&P:  6/10/22  H&P requested?:   Letter sent/email?: my chart  Does patient require /language? N       PMH:

## 2022-06-15 ENCOUNTER — HOSPITAL ENCOUNTER (OUTPATIENT)
Facility: CLINIC | Age: 62
Discharge: HOME | End: 2022-06-15
Attending: RADIOLOGY | Admitting: RADIOLOGY
Payer: COMMERCIAL

## 2022-06-15 ENCOUNTER — MEDICAL CORRESPONDENCE (OUTPATIENT)
Dept: PEDIATRICS | Facility: CLINIC | Age: 62
End: 2022-06-15

## 2022-06-15 ENCOUNTER — APPOINTMENT (OUTPATIENT)
Dept: INTERVENTIONAL RADIOLOGY/VASCULAR | Facility: CLINIC | Age: 62
End: 2022-06-15
Attending: INTERNAL MEDICINE
Payer: COMMERCIAL

## 2022-06-15 VITALS
OXYGEN SATURATION: 97 % | SYSTOLIC BLOOD PRESSURE: 124 MMHG | HEART RATE: 77 BPM | DIASTOLIC BLOOD PRESSURE: 83 MMHG | TEMPERATURE: 97.7 F | RESPIRATION RATE: 16 BRPM

## 2022-06-15 DIAGNOSIS — C25.1: ICD-10-CM

## 2022-06-15 PROCEDURE — 272N000604 HC WOUND GLUE CR3

## 2022-06-15 PROCEDURE — 76937 US GUIDE VASCULAR ACCESS: CPT

## 2022-06-15 PROCEDURE — 250N000009 HC RX 250: Performed by: NURSE PRACTITIONER

## 2022-06-15 PROCEDURE — 250N000011 HC RX IP 250 OP 636: Performed by: RADIOLOGY

## 2022-06-15 PROCEDURE — 250N000011 HC RX IP 250 OP 636: Performed by: NURSE PRACTITIONER

## 2022-06-15 PROCEDURE — 77001 FLUOROGUIDE FOR VEIN DEVICE: CPT

## 2022-06-15 PROCEDURE — C1769 GUIDE WIRE: HCPCS

## 2022-06-15 PROCEDURE — 272N000504 HC NEEDLE CR4

## 2022-06-15 PROCEDURE — 99152 MOD SED SAME PHYS/QHP 5/>YRS: CPT

## 2022-06-15 PROCEDURE — 36561 INSERT TUNNELED CV CATH: CPT

## 2022-06-15 PROCEDURE — C1788 PORT, INDWELLING, IMP: HCPCS

## 2022-06-15 PROCEDURE — 250N000011 HC RX IP 250 OP 636

## 2022-06-15 RX ORDER — FENTANYL CITRATE 50 UG/ML
25-50 INJECTION, SOLUTION INTRAMUSCULAR; INTRAVENOUS EVERY 5 MIN PRN
Status: DISCONTINUED | OUTPATIENT
Start: 2022-06-15 | End: 2022-06-15 | Stop reason: HOSPADM

## 2022-06-15 RX ORDER — NALOXONE HYDROCHLORIDE 0.4 MG/ML
0.4 INJECTION, SOLUTION INTRAMUSCULAR; INTRAVENOUS; SUBCUTANEOUS
Status: DISCONTINUED | OUTPATIENT
Start: 2022-06-15 | End: 2022-06-15 | Stop reason: HOSPADM

## 2022-06-15 RX ORDER — FLUMAZENIL 0.1 MG/ML
0.2 INJECTION, SOLUTION INTRAVENOUS
Status: DISCONTINUED | OUTPATIENT
Start: 2022-06-15 | End: 2022-06-15 | Stop reason: HOSPADM

## 2022-06-15 RX ORDER — CEFAZOLIN SODIUM 2 G/100ML
INJECTION, SOLUTION INTRAVENOUS
Status: DISCONTINUED
Start: 2022-06-15 | End: 2022-06-15 | Stop reason: HOSPADM

## 2022-06-15 RX ORDER — NALOXONE HYDROCHLORIDE 0.4 MG/ML
0.2 INJECTION, SOLUTION INTRAMUSCULAR; INTRAVENOUS; SUBCUTANEOUS
Status: DISCONTINUED | OUTPATIENT
Start: 2022-06-15 | End: 2022-06-15 | Stop reason: HOSPADM

## 2022-06-15 RX ORDER — LIDOCAINE HYDROCHLORIDE 10 MG/ML
INJECTION, SOLUTION EPIDURAL; INFILTRATION; INTRACAUDAL; PERINEURAL
Status: DISCONTINUED
Start: 2022-06-15 | End: 2022-06-15 | Stop reason: HOSPADM

## 2022-06-15 RX ORDER — LIDOCAINE HYDROCHLORIDE 10 MG/ML
INJECTION, SOLUTION EPIDURAL; INFILTRATION; INTRACAUDAL; PERINEURAL
Status: DISCONTINUED
Start: 2022-06-15 | End: 2022-06-15 | Stop reason: WASHOUT

## 2022-06-15 RX ORDER — FENTANYL CITRATE 50 UG/ML
INJECTION, SOLUTION INTRAMUSCULAR; INTRAVENOUS
Status: DISCONTINUED
Start: 2022-06-15 | End: 2022-06-15 | Stop reason: HOSPADM

## 2022-06-15 RX ORDER — ACETAMINOPHEN 325 MG/1
650 TABLET ORAL
Status: DISCONTINUED | OUTPATIENT
Start: 2022-06-15 | End: 2022-06-15 | Stop reason: HOSPADM

## 2022-06-15 RX ORDER — HEPARIN SODIUM (PORCINE) LOCK FLUSH IV SOLN 100 UNIT/ML 100 UNIT/ML
SOLUTION INTRAVENOUS
Status: COMPLETED
Start: 2022-06-15 | End: 2022-06-15

## 2022-06-15 RX ORDER — CEFAZOLIN SODIUM 2 G/100ML
2 INJECTION, SOLUTION INTRAVENOUS
Status: COMPLETED | OUTPATIENT
Start: 2022-06-15 | End: 2022-06-15

## 2022-06-15 RX ADMIN — HEPARIN SODIUM (PORCINE) LOCK FLUSH IV SOLN 100 UNIT/ML 500 UNITS: 100 SOLUTION at 10:49

## 2022-06-15 RX ADMIN — FENTANYL CITRATE 25 MCG: 50 INJECTION, SOLUTION INTRAMUSCULAR; INTRAVENOUS at 10:45

## 2022-06-15 RX ADMIN — FENTANYL CITRATE 50 MCG: 50 INJECTION, SOLUTION INTRAMUSCULAR; INTRAVENOUS at 10:33

## 2022-06-15 RX ADMIN — MIDAZOLAM HYDROCHLORIDE 1 MG: 1 INJECTION, SOLUTION INTRAMUSCULAR; INTRAVENOUS at 10:34

## 2022-06-15 RX ADMIN — MIDAZOLAM HYDROCHLORIDE 0.5 MG: 1 INJECTION, SOLUTION INTRAMUSCULAR; INTRAVENOUS at 10:45

## 2022-06-15 RX ADMIN — LIDOCAINE HYDROCHLORIDE 26 ML: 10 INJECTION, SOLUTION EPIDURAL; INFILTRATION; INTRACAUDAL; PERINEURAL at 10:50

## 2022-06-15 RX ADMIN — CEFAZOLIN SODIUM 2 G: 2 INJECTION, SOLUTION INTRAVENOUS at 10:18

## 2022-06-15 NOTE — IR NOTE
Interventional Radiology Pre-Procedure Sedation Assessment   Time of Assessment: 10:12 AM    Expected Level: Moderate Sedation    Indication: Sedation is required for the following type of Procedure: Venous Access    Sedation and procedural consent: Risks, benefits and alternatives were discussed with Patient    PO Intake: Appropriately NPO for procedure    ASA Class: Class 3 - SEVERE SYSTEMIC DISEASE, DEFINITE FUNCTIONAL LIMITATIONS.    Mallampati: Grade 3:  Soft palate visible, posterior pharyngeal wall not visible    Lungs: Lungs Clear with good breath sounds bilaterally    Heart: Normal heart sounds and rate    History and physical reviewed and no updates needed. I have reviewed the lab findings, diagnostic data, medications, and the plan for sedation. I have determined this patient to be an appropriate candidate for the planned sedation and procedure and have reassessed the patient IMMEDIATELY PRIOR to sedation and procedure.    Skinny Johnson MD

## 2022-06-15 NOTE — IP AVS SNAPSHOT
[facility] Interventional Radiology  [address]  [facility] [address]  Phone: [phone]  Fax: [phone]                                      After Visit Summary   6/15/2022    Lani Walker   MRN: [id number]           After Visit Summary Signature Page    I have received my discharge instructions, and my questions have been answered. I have discussed any challenges I see with this plan with the nurse or doctor.    ..........................................................................................................................................  Patient/Patient Representative Signature      ..........................................................................................................................................  Patient Representative Print Name and Relationship to Patient    ..................................................               ................................................  Date                                   Time    ..........................................................................................................................................  Reviewed by Signature/Title    ...................................................              ..............................................  Date                                               Time          22EPIC Rev 08/18

## 2022-06-15 NOTE — IP AVS SNAPSHOT
After Visit Summary Template Not Found    This Print Group is only intended to be used in the After Visit Summary and can only be used in a report that uses a released After Visit Summary Template.                       MRN:[id number]                          After Visit Summary   6/15/2022    Lani Walker   MRN: [id number]           Visit Information        Department      6/15/2022  9:00 AM [facility] Interventional Radiology          Review of your medicines      UNREVIEWED medicines. Ask your doctor about these medicines       Dose / Directions   albuterol 108 (90 Base) MCG/ACT inhaler  Commonly known as: PROAIR HFA/PROVENTIL HFA/VENTOLIN HFA  Used for: Tobacco abuse, Encounter for smoking cessation counseling      INHALE TWO PUFFS BY MOUTH EVERY SIX HOURS AS NEEDED FOR SHORTNESS OF BREATH/DYSPNEA OR WHEEZING  Quantity: 8.5 g  Refills: 11     atorvastatin 40 MG tablet  Commonly known as: LIPITOR  Used for: Hypercholesteremia      Dose: 40 mg  Take 1 tablet (40 mg) by mouth daily ### DO NOT FILL NOW.  Please update patient's profile to reflect additional refills.  ####  Quantity: 90 tablet  Refills: 3     cetirizine 10 MG tablet  Commonly known as: zyrTEC      Dose: 10 mg  Take 10 mg by mouth daily  Refills: 0     FLUoxetine 20 MG capsule  Commonly known as: PROzac  Used for: Mild episode of recurrent major depressive disorder (H), Anxiety      TAKE 1 CAPSULE(20 MG) BY MOUTH DAILY  Quantity: 90 capsule  Refills: 1     hydrochlorothiazide 25 MG tablet  Commonly known as: HYDRODIURIL  Used for: Essential hypertension, benign      Dose: 25 mg  Take 1 tablet (25 mg) by mouth daily  Quantity: 90 tablet  Refills: 3     vitamin C 100 MG Chew      Refills: 0              Protect others around you: Learn how to safely use, store and throw away your medicines at www.disposemymeds.org.       Follow-ups after your visit       Your next 10 appointments already scheduled    Papa 15, 2022 10:00 AM  (Arrive by  9:00 AM)  IR CHEST PORT PLACEMENT > 5 YRS OF AGE with RHIR11, CATHERINEIRTEASHLEY, RH IR RAD  [facility] Interventional Radiology ([facility] ) [address]  [facility] [id number] [phone]   How do I prepare for my exam? (Food and drink instructions)  Adults and Children over 2 years old: No eating for 8 hours before your procedure. You may have clear liquids up until 2 hours beforehand. These include water, clear tea, black coffee and fruit juice without pulp.  Children under 2 years old:  No eating or formula for 6 hours before your procedure. You may have clear liquids up until 2 hours beforehand. No breast milk for 4 hours before your procedure.    How do I prepare for my exam? (Other instructions)  We will call you to talk about your procedure and answer your questions. We will tell you what time to arrive. We will also ask about any medicines you are taking.  You will need to have a history and physical within 30 days before this procedure.    What should I wear: Please wear loose clothing, such as a sweat suit or jogging clothes. You will be asked to undress and put on a hospital gown.    How long does the exam take: You should expect to be at the facility for approximately 4 hours    What should I bring: Please bring any scans or X-rays taken at other hospitals, if similar tests were done. Also bring a list of your medicines, including vitamins, minerals and over-the-counter drugs. It is safest to leave personal items at home.    Do I need a :  Yes, you may not drive or take a bus or taxi by yourself. You will need an adult to take you home. It is recommended that a responsible adult remain with you for 6 hours.    What do I need to tell my doctor:  Tell your doctor if:  * You have ever had an allergic reaction to X-ray dye (contrast fluid).  * If there's any chance you are pregnant.    What should I do after the exam:  Rest and do quiet activities for 24  hours. Avoid any heavy activity (lifting, vacuuming, exercise) on the day of the exam.  Do not make any major decisions and ensure you have a responsible adult with you for the remainder of the day.   Do not drive or use dangerous machines or tools for 24 hours.  Eat small, frequent meals to prevent nausea and vomiting.   Drink liquids as directed. Do not drink alcohol or take medicines that make you drowsy.  You should be able to return to your everyday activities the next day.    Who should I call with questions: If you have any questions, please call the Imaging Department where you will have your exam. Directions, parking instructions, and other information are available on our website, [email or web address].       Jun 23, 2022  2:15 PM  Office Visit with Elizabeth Hansen DO  [facility] ([facility] ) [address]  [facility] [id number] [phone]   Bring a current list of meds and any records pertaining to this visit.  For Physicals, please bring immunization records and any forms needing to be filled out. Please arrive 10 minutes early to complete paperwork.       Rony 10, 2023  8:00 AM  New Visit with Harvey Holden MD  [facility] ([facility] ) [address] [place] [address] [address]  [facility] [address] [phone]           Care Instructions       Further instructions from your care team         Going Home after Your Port Is Inserted  _______________________________________    Patient Name: Lani Walker  Today's Date: Cecelia 15, 2022    The doctor who inserted your port was:  Dr Mcgraw at [facility]     Have your port site and/or neck wound checked on:  Follow up appt     Your port may be accessed right away. A nurse needs to flush your port every 30 days or after each use.     When you get home:  You should have an adult with you for the first  6 hours  No driving or drinking alcohol until tomorrow. You may still have side effects from the medicine you received today (you may feel drowsy, unsteady or forgetful).   You may go back to your regular diet today.   If you take aspirin or Plavix, you may begin taking it again tomorrow. You may restart all other medicines today. Use pain medicine as directed.  Avoid heavy lifting or the overuse of your shoulder for three days.  Caring for the port site and/or neck wound:  Keep the port site clean and dry. Cover the site with plastic before taking a shower. Do this until the site has healed.   Keep the bandage on your port site for three days. Change it if it gets wet or dirty. After three days, you may use Band-Aids until the wound has healed.  For a neck wound, leave the tape on for three days. You may cover it with a Band-Aid for comfort.   If you have oozing or bleeding from the port site or from the cut in your neck:   Put direct pressure on the wound for 5 to 10 minutes with a gauze pad.  If you still have bleeding after 10 minutes, call your doctor.  If you are bleeding a lot and can't control it with direct pressure, call 911.    Call your doctor if you have:  Bleeding from a wound after 10 minutes of direct pressure.  Swelling in your neck or over your port site.  Signs of infection: a fever over 100 F (37.8 C) under the tongue; the site is red, tender or draining.      Additional Information About Your Visit       [facility] Information    [employer] gives you secure access to your electronic health record. If you see a primary care provider, you can also send messages to your care team and make appointments. If you have questions, please call your primary care clinic.  If you do not have a primary care provider, please call [phone] and they will assist you.       Care EveryWhere ID    This is your Care EveryWhere ID. This could be used by other organizations to access your [facility]  records  [id number]       Your Vitals Were  Most recent update: 6/15/2022  9:18 AM    Blood Pressure   134/89 (BP Location: Right arm)    Pulse   95    Temperature   97.7  F (36.5  C) (Temporal)    Respirations   16    Pulse Oximetry   98%          Primary Care Provider  MEHREEN Puckett [facility] Office Phone #  [phone] Fax #  [phone]      Equal Access to Services    MACARIO GIBBONS : Hadii aad ku hadasho Soomaali, waaxda luqadaha, qaybta kaalmada adeegyada, waxay idiin hayaan adeeg kharash la'aan . So [facility] [phone].    ATENCIÓN: Si habla español, tiene a biggs disposición servicios gratuitos de asistencia lingüística. Llame al [phone].    We comply with applicable federal and state civil rights laws, including the [place] Human Rights Act. We do not discriminate on the basis of race, color, creed, Jew, national origin, marital status, age, disability, sex, sexual orientation, or gender identity.    If you would like an itemization of your charges they will now be available in [employer] [facility] 30 days after discharge. To access the itemized statements in [employer] go to billing/billing summary. From there select view account. There will be multiple tabs showing an overview of your account, detail, payments, and communications. From the communications tab you can see your monthly statements, your itemized statements, and any billing letters generated for your account. If you do not have a [employer] account and need help getting access please contact [employer] support at [phone].  If you would prefer to have your itemized statements mailed please contact our automated itemized bill request line at [phone] option  2.       Thank you!    Thank you for choosing [facility] for your care. Our goal is always to provide you with excellent care. Hearing back from our patients is one way we can continue to improve our services. Please take a few minutes to complete the written survey that you  may receive in the mail after you visit. If you would like to speak to someone directly about your visit please contact Patient Relations at [phone]. Thank you!              Medication List      ASK your doctor about these medications          Morning Afternoon Evening Bedtime As Needed    albuterol 108 (90 Base) MCG/ACT inhaler  Also known as: PROAIR HFA/PROVENTIL HFA/VENTOLIN HFA  INSTRUCTIONS: INHALE TWO PUFFS BY MOUTH EVERY SIX HOURS AS NEEDED FOR SHORTNESS OF BREATH/DYSPNEA OR WHEEZING  Doctor's comments: Pharmacy may dispense brand covered by insurance (Proair, or proventil or ventolin or generic albuterol inhaler)                     atorvastatin 40 MG tablet  Also known as: LIPITOR  INSTRUCTIONS: Take 1 tablet (40 mg) by mouth daily ### DO NOT FILL NOW.  Please update patient's profile to reflect additional refills.  ####                     cetirizine 10 MG tablet  Also known as: zyrTEC  INSTRUCTIONS: Take 10 mg by mouth daily                     FLUoxetine 20 MG capsule  Also known as: PROzac  INSTRUCTIONS: TAKE 1 CAPSULE(20 MG) BY MOUTH DAILY                     hydrochlorothiazide 25 MG tablet  Also known as: HYDRODIURIL  INSTRUCTIONS: Take 1 tablet (25 mg) by mouth daily                     vitamin C 100 MG Chew

## 2022-06-15 NOTE — DISCHARGE INSTRUCTIONS
Going Home after Your Port Is Inserted  _______________________________________    Patient Name: Lani Walker  Today's Date: Cecelia 15, 2022    The doctor who inserted your port was:  Dr Mcgraw at [facility]     Have your port site and/or neck wound checked on:  Follow up appt     Your port may be accessed right away. A nurse needs to flush your port every 30 days or after each use.     When you get home:  You should have an adult with you for the first 6 hours  No driving or drinking alcohol until tomorrow. You may still have side effects from the medicine you received today (you may feel drowsy, unsteady or forgetful).   You may go back to your regular diet today.   If you take aspirin or Plavix, you may begin taking it again tomorrow. You may restart all other medicines today. Use pain medicine as directed.  Avoid heavy lifting or the overuse of your shoulder for three days.  Caring for the port site and/or neck wound:  Keep the port site clean and dry. Cover the site with plastic before taking a shower. Do this until the site has healed.   Keep the bandage on your port site for three days. Change it if it gets wet or dirty. After three days, you may use Band-Aids until the wound has healed.  For a neck wound, leave the tape on for three days. You may cover it with a Band-Aid for comfort.   If you have oozing or bleeding from the port site or from the cut in your neck:   Put direct pressure on the wound for 5 to 10 minutes with a gauze pad.  If you still have bleeding after 10 minutes, call your doctor.  If you are bleeding a lot and can't control it with direct pressure, call 911.    Call your doctor if you have:  Bleeding from a wound after 10 minutes of direct pressure.  Swelling in your neck or over your port site.  Signs of infection: a fever over 100 F (37.8 C) under the tongue; the site is red, tender or draining.

## 2022-06-15 NOTE — PLAN OF CARE
Post Procedure Summary:  Prior to the start of the procedure and with procedural staff participation, I verbally confirmed the patient s identity using two indicators, relevant allergies, that the procedure was appropriate and matched the consent or emergent situation, and that the correct equipment/implants were available. Immediately prior to starting the procedure I conducted the Time Out with the procedural staff and re-confirmed the patient s name, procedure, and site/side. (The Joint Commission universal protocol was followed.)  Yes       Sedatives: Fentanyl and Midazolam (Versed)    Vital signs, airway and pulse oximetry were monitored and remained stable throughout the procedure and sedation was maintained until the procedure was complete.  The patient was monitored by staff until sedation discharge criteria were met.    Patient tolerance: Patient tolerated the procedure well with no immediate complications.    Time of sedation in minutes: 30 Minutes minutes from beginning to end of physician one to one monitoring.

## 2022-07-18 ENCOUNTER — TRANSFERRED RECORDS (OUTPATIENT)
Dept: HEALTH INFORMATION MANAGEMENT | Facility: CLINIC | Age: 62
End: 2022-07-18

## 2022-08-15 ENCOUNTER — TRANSFERRED RECORDS (OUTPATIENT)
Dept: HEALTH INFORMATION MANAGEMENT | Facility: CLINIC | Age: 62
End: 2022-08-15

## 2022-08-30 ENCOUNTER — TRANSFERRED RECORDS (OUTPATIENT)
Dept: HEALTH INFORMATION MANAGEMENT | Facility: CLINIC | Age: 62
End: 2022-08-30

## 2022-09-08 ENCOUNTER — HOSPITAL ENCOUNTER (OUTPATIENT)
Dept: CT IMAGING | Facility: CLINIC | Age: 62
Discharge: HOME | End: 2022-09-08
Attending: INTERNAL MEDICINE | Admitting: INTERNAL MEDICINE
Payer: COMMERCIAL

## 2022-09-08 DIAGNOSIS — C25.1 MALIGNANT NEOPLASM OF BODY OF PANCREAS (H): ICD-10-CM

## 2022-09-08 PROCEDURE — 250N000009 HC RX 250: Performed by: INTERNAL MEDICINE

## 2022-09-08 PROCEDURE — 74177 CT ABD & PELVIS W/CONTRAST: CPT

## 2022-09-08 PROCEDURE — 250N000011 HC RX IP 250 OP 636: Performed by: INTERNAL MEDICINE

## 2022-09-08 RX ORDER — IOPAMIDOL 755 MG/ML
500 INJECTION, SOLUTION INTRAVASCULAR ONCE
Status: COMPLETED | OUTPATIENT
Start: 2022-09-08 | End: 2022-09-08

## 2022-09-08 RX ADMIN — IOPAMIDOL 84 ML: 755 INJECTION, SOLUTION INTRAVENOUS at 09:42

## 2022-09-08 RX ADMIN — SODIUM CHLORIDE 58 ML: 9 INJECTION, SOLUTION INTRAVENOUS at 09:43

## 2022-09-12 ENCOUNTER — TRANSFERRED RECORDS (OUTPATIENT)
Dept: HEALTH INFORMATION MANAGEMENT | Facility: CLINIC | Age: 62
End: 2022-09-12

## 2022-09-15 DIAGNOSIS — F33.0 MILD EPISODE OF RECURRENT MAJOR DEPRESSIVE DISORDER (H): ICD-10-CM

## 2022-09-15 DIAGNOSIS — F41.9 ANXIETY: ICD-10-CM

## 2022-09-16 NOTE — TELEPHONE ENCOUNTER
Routing refill request to provider for review/approval because:  PHQ-9 score:    PHQ 4/27/2022   PHQ-9 Total Score 12   Q9: Thoughts of better off dead/self-harm past 2 weeks Not at all     Delphine Malave RN

## 2022-09-23 DIAGNOSIS — I10 ESSENTIAL HYPERTENSION, BENIGN: ICD-10-CM

## 2022-09-23 NOTE — TELEPHONE ENCOUNTER
Pending Prescriptions:                       Disp   Refills    hydrochlorothiazide (HYDRODIURIL) 25 MG ta*90 tab*3        Sig: TAKE 1 TABLET(25 MG) BY MOUTH DAILY    Routing refill request to provider for review/approval because:  Labs out of range: sodium    Please advise, thanks.

## 2022-09-27 ENCOUNTER — TRANSFERRED RECORDS (OUTPATIENT)
Dept: HEALTH INFORMATION MANAGEMENT | Facility: CLINIC | Age: 62
End: 2022-09-27

## 2022-09-27 RX ORDER — HYDROCHLOROTHIAZIDE 25 MG/1
TABLET ORAL
Qty: 90 TABLET | Refills: 3 | Status: SHIPPED | OUTPATIENT
Start: 2022-09-27 | End: 2024-01-01

## 2022-10-13 ENCOUNTER — TELEPHONE (OUTPATIENT)
Dept: GASTROENTEROLOGY | Facility: CLINIC | Age: 62
End: 2022-10-13

## 2022-10-13 NOTE — TELEPHONE ENCOUNTER
KEARA and Mukesh message to call [phone] to reschedule the Dr. Holden appt.  I did mention Pt can see the ANDRES Jain if they would like.    [facility]       Cardiac Catheterization Lab Report    PATIENT: Maegan Mccarthy  DATE: 3/4/2021  MRN: [id number]  CSN: [id number]  : 1958      Performing Physician: Chelsey De Paz MD, GASTON, [facility]  Primary Care Physician: Alexis Kendall MD  Admitting/Referring provider: Nichol Dey MD     Procedures Performed:   1. Left heart catheterization  2. Selective left and right coronary angiogram  3. Left ventriculography     Procedure Findings:  1. Severe multi vessel coronary artery diease  2. Severely reduced LVEF <30%  3. Severely elevated LVEDP 44    Indications:   Patient Active Problem List   Diagnosis    Acute systolic CHF (congestive heart failure), NYHA class 4 (HCC)   Abnormal EKG  Acute respiratory failure    Details:   Maegan Mccarthy was brought to the cardiac catheterization lab in a fasting state after informed consent was obtained. If the patient was able to provide written consent, it was obtained. The patient's vitals were monitored through out the procedure. The patient was sedated using the appropriate levels of sedation and ASA guidelines. The appropriate right femoral access site area was prepped and drapped in a sterile fashion. The area was anesthetized with 2% lidocaine. Using the modified Seldinger technique, an arterial sheath was introduced into the arterial access site using a single anterior wall puncture. The sheath was flushed and prepped in usual fashion. Catheters used during the procedure included 5 Hungarian JL4., JR4, and pigtail catheters. The catheters were advanced and removed over a .035\" wire, into the appropriate positions. Multiple angiographic views were obtained. An LV gram was obtained. Findings:    1. Left main coronary artery was normal. It gave off the left anterior descending artery and left circumflex. 2. Left anterior descending artery has severe atherosclerotic disease. It was moderate in size.  It gave off septal infusion      Donell Harper MD, GASTON, [place] [address] Cardiology  [facility] [place]  [phone] Main central office  [phone] [facility] office  3/4/2021  10:07 PM

## 2022-10-15 ENCOUNTER — HEALTH MAINTENANCE LETTER (OUTPATIENT)
Age: 62
End: 2022-10-15

## 2022-10-24 NOTE — TELEPHONE ENCOUNTER
KEARA (2nd) to call [phone] to reschedule the Dr. Holden appt. I did mention Pt can see the ANDRES Jain if they would like.

## 2022-10-26 ENCOUNTER — TRANSFERRED RECORDS (OUTPATIENT)
Dept: HEALTH INFORMATION MANAGEMENT | Facility: CLINIC | Age: 62
End: 2022-10-26

## 2022-11-16 ENCOUNTER — TRANSFERRED RECORDS (OUTPATIENT)
Dept: HEALTH INFORMATION MANAGEMENT | Facility: CLINIC | Age: 62
End: 2022-11-16

## 2022-11-26 DIAGNOSIS — Z72.0 TOBACCO ABUSE: ICD-10-CM

## 2022-11-26 DIAGNOSIS — Z71.6 ENCOUNTER FOR SMOKING CESSATION COUNSELING: ICD-10-CM

## 2022-11-29 RX ORDER — ALBUTEROL SULFATE 90 UG/1
AEROSOL, METERED RESPIRATORY (INHALATION)
Qty: 8.5 G | Refills: 11 | Status: SHIPPED | OUTPATIENT
Start: 2022-11-29

## 2022-11-29 NOTE — TELEPHONE ENCOUNTER
Routing refill request to provider for review/approval because:  Please verify correct dx codes are attached.  Currently the following is attached:  For: Tobacco abuse, Encounter for smoking cessation counseling  Marium DUEÑAS RN, BSN

## 2022-12-07 ENCOUNTER — TRANSFERRED RECORDS (OUTPATIENT)
Dept: HEALTH INFORMATION MANAGEMENT | Facility: CLINIC | Age: 62
End: 2022-12-07

## 2022-12-21 DIAGNOSIS — F41.9 ANXIETY: ICD-10-CM

## 2022-12-21 DIAGNOSIS — F33.0 MILD EPISODE OF RECURRENT MAJOR DEPRESSIVE DISORDER (H): ICD-10-CM

## 2022-12-21 NOTE — TELEPHONE ENCOUNTER
Routing refill request to provider for review/approval because:      90 tablets with 1 refill (6 month supply) was just sent on 9/16/2022.     Marleni العلي, RN BSN MSN  [facility]

## 2023-01-01 ENCOUNTER — HOSPITAL ENCOUNTER (OUTPATIENT)
Dept: CT IMAGING | Facility: CLINIC | Age: 63
Discharge: HOME | End: 2023-03-03
Attending: INTERNAL MEDICINE | Admitting: INTERNAL MEDICINE
Payer: COMMERCIAL

## 2023-01-01 ENCOUNTER — TRANSFERRED RECORDS (OUTPATIENT)
Dept: HEALTH INFORMATION MANAGEMENT | Facility: CLINIC | Age: 63
End: 2023-01-01
Payer: COMMERCIAL

## 2023-01-01 ENCOUNTER — PATIENT OUTREACH (OUTPATIENT)
Dept: CARE COORDINATION | Facility: CLINIC | Age: 63
End: 2023-01-01
Payer: COMMERCIAL

## 2023-01-01 ENCOUNTER — TELEPHONE (OUTPATIENT)
Dept: INTERNAL MEDICINE | Facility: CLINIC | Age: 63
End: 2023-01-01
Payer: COMMERCIAL

## 2023-01-01 ENCOUNTER — OFFICE VISIT (OUTPATIENT)
Dept: INTERNAL MEDICINE | Facility: CLINIC | Age: 63
End: 2023-01-01
Payer: COMMERCIAL

## 2023-01-01 ENCOUNTER — ALLIED HEALTH/NURSE VISIT (OUTPATIENT)
Dept: NURSING | Facility: CLINIC | Age: 63
End: 2023-01-01
Payer: COMMERCIAL

## 2023-01-01 ENCOUNTER — HEALTH MAINTENANCE LETTER (OUTPATIENT)
Age: 63
End: 2023-01-01

## 2023-01-01 ENCOUNTER — TELEPHONE (OUTPATIENT)
Dept: INTERNAL MEDICINE | Facility: CLINIC | Age: 63
End: 2023-01-01

## 2023-01-01 ENCOUNTER — HOSPITAL ENCOUNTER (OUTPATIENT)
Dept: CT IMAGING | Facility: CLINIC | Age: 63
Discharge: HOME | End: 2023-10-31
Attending: INTERNAL MEDICINE | Admitting: INTERNAL MEDICINE
Payer: COMMERCIAL

## 2023-01-01 ENCOUNTER — VIRTUAL VISIT (OUTPATIENT)
Dept: EDUCATION SERVICES | Facility: CLINIC | Age: 63
End: 2023-01-01
Payer: COMMERCIAL

## 2023-01-01 ENCOUNTER — HOSPITAL ENCOUNTER (OUTPATIENT)
Dept: CT IMAGING | Facility: CLINIC | Age: 63
Discharge: HOME | End: 2023-06-27
Attending: INTERNAL MEDICINE | Admitting: INTERNAL MEDICINE
Payer: COMMERCIAL

## 2023-01-01 ENCOUNTER — TRANSFERRED RECORDS (OUTPATIENT)
Dept: HEALTH INFORMATION MANAGEMENT | Facility: CLINIC | Age: 63
End: 2023-01-01

## 2023-01-01 VITALS
DIASTOLIC BLOOD PRESSURE: 80 MMHG | OXYGEN SATURATION: 100 % | WEIGHT: 177 LBS | BODY MASS INDEX: 27.78 KG/M2 | HEIGHT: 67 IN | TEMPERATURE: 98.2 F | RESPIRATION RATE: 20 BRPM | SYSTOLIC BLOOD PRESSURE: 119 MMHG

## 2023-01-01 DIAGNOSIS — Z79.4 TYPE 2 DIABETES MELLITUS WITH OTHER SPECIFIED COMPLICATION, WITH LONG-TERM CURRENT USE OF INSULIN (H): Primary | ICD-10-CM

## 2023-01-01 DIAGNOSIS — C25.9 MALIGNANT NEOPLASM OF PANCREAS, UNSPECIFIED LOCATION OF MALIGNANCY (H): ICD-10-CM

## 2023-01-01 DIAGNOSIS — E11.65 UNCONTROLLED TYPE 2 DIABETES MELLITUS WITH HYPERGLYCEMIA (H): ICD-10-CM

## 2023-01-01 DIAGNOSIS — E08.9 DIABETES MELLITUS DUE TO UNDERLYING CONDITION WITHOUT COMPLICATION, WITH LONG-TERM CURRENT USE OF INSULIN (H): Primary | ICD-10-CM

## 2023-01-01 DIAGNOSIS — E11.9 DIABETES MELLITUS (H): Primary | ICD-10-CM

## 2023-01-01 DIAGNOSIS — Z79.4 DIABETES MELLITUS DUE TO UNDERLYING CONDITION WITHOUT COMPLICATION, WITH LONG-TERM CURRENT USE OF INSULIN (H): Primary | ICD-10-CM

## 2023-01-01 DIAGNOSIS — Z53.9 DIAGNOSIS FOR ++++ WALK IN CLINIC ++++: Primary | ICD-10-CM

## 2023-01-01 DIAGNOSIS — C25.1 MALIGNANT NEOPLASM OF BODY OF PANCREAS (H): ICD-10-CM

## 2023-01-01 DIAGNOSIS — Z79.4 DIABETES MELLITUS DUE TO UNDERLYING CONDITION WITHOUT COMPLICATION, WITH LONG-TERM CURRENT USE OF INSULIN (H): ICD-10-CM

## 2023-01-01 DIAGNOSIS — R73.09 ELEVATED GLUCOSE: Primary | ICD-10-CM

## 2023-01-01 DIAGNOSIS — E08.9 DIABETES MELLITUS DUE TO UNDERLYING CONDITION WITHOUT COMPLICATION, WITH LONG-TERM CURRENT USE OF INSULIN (H): ICD-10-CM

## 2023-01-01 DIAGNOSIS — E11.69 TYPE 2 DIABETES MELLITUS WITH OTHER SPECIFIED COMPLICATION, WITH LONG-TERM CURRENT USE OF INSULIN (H): Primary | ICD-10-CM

## 2023-01-01 LAB
ALBUMIN SERPL BCG-MCNC: 3.8 G/DL (ref 3.5–5.2)
ALP SERPL-CCNC: 155 U/L (ref 35–104)
ALT SERPL W P-5'-P-CCNC: 12 U/L (ref 0–50)
ANION GAP SERPL CALCULATED.3IONS-SCNC: 14 MMOL/L (ref 7–15)
AST SERPL W P-5'-P-CCNC: 17 U/L (ref 0–45)
BILIRUB SERPL-MCNC: 0.5 MG/DL
BUN SERPL-MCNC: 10.9 MG/DL (ref 8–23)
CALCIUM SERPL-MCNC: 9.2 MG/DL (ref 8.8–10.2)
CHLORIDE SERPL-SCNC: 99 MMOL/L (ref 98–107)
CREAT SERPL-MCNC: 0.85 MG/DL (ref 0.51–0.95)
DEPRECATED HCO3 PLAS-SCNC: 23 MMOL/L (ref 22–29)
EGFRCR SERPLBLD CKD-EPI 2021: 77 ML/MIN/1.73M2
GLUCOSE SERPL-MCNC: 313 MG/DL (ref 70–99)
HBA1C MFR BLD: 10 % (ref 0–5.6)
POTASSIUM SERPL-SCNC: 3.7 MMOL/L (ref 3.4–5.3)
PROT SERPL-MCNC: 6.7 G/DL (ref 6.4–8.3)
SODIUM SERPL-SCNC: 136 MMOL/L (ref 135–145)

## 2023-01-01 PROCEDURE — 90682 RIV4 VACC RECOMBINANT DNA IM: CPT | Performed by: INTERNAL MEDICINE

## 2023-01-01 PROCEDURE — 90471 IMMUNIZATION ADMIN: CPT | Performed by: INTERNAL MEDICINE

## 2023-01-01 PROCEDURE — 250N000009 HC RX 250: Performed by: INTERNAL MEDICINE

## 2023-01-01 PROCEDURE — 80053 COMPREHEN METABOLIC PANEL: CPT | Performed by: INTERNAL MEDICINE

## 2023-01-01 PROCEDURE — 74177 CT ABD & PELVIS W/CONTRAST: CPT

## 2023-01-01 PROCEDURE — 36415 COLL VENOUS BLD VENIPUNCTURE: CPT | Performed by: INTERNAL MEDICINE

## 2023-01-01 PROCEDURE — 83036 HEMOGLOBIN GLYCOSYLATED A1C: CPT | Performed by: INTERNAL MEDICINE

## 2023-01-01 PROCEDURE — 99214 OFFICE O/P EST MOD 30 MIN: CPT | Mod: 25 | Performed by: INTERNAL MEDICINE

## 2023-01-01 PROCEDURE — 250N000011 HC RX IP 250 OP 636: Performed by: INTERNAL MEDICINE

## 2023-01-01 PROCEDURE — 98968 PH1 ASSMT&MGMT NQHP 21-30: CPT | Mod: 93 | Performed by: DIETITIAN, REGISTERED

## 2023-01-01 RX ORDER — IOPAMIDOL 755 MG/ML
500 INJECTION, SOLUTION INTRAVASCULAR ONCE
Status: COMPLETED | OUTPATIENT
Start: 2023-01-01 | End: 2023-01-01

## 2023-01-01 RX ORDER — BLOOD-GLUCOSE SENSOR
1 EACH MISCELLANEOUS
Qty: 6 EACH | Refills: 3 | Status: SHIPPED | OUTPATIENT
Start: 2023-01-01

## 2023-01-01 RX ORDER — LANCETS
EACH MISCELLANEOUS
Qty: 100 EACH | Refills: 6 | Status: SHIPPED | OUTPATIENT
Start: 2023-01-01

## 2023-01-01 RX ORDER — BLOOD-GLUCOSE METER
EACH MISCELLANEOUS
Qty: 1 KIT | Refills: 0 | Status: SHIPPED | OUTPATIENT
Start: 2023-01-01

## 2023-01-01 RX ORDER — IOPAMIDOL 755 MG/ML
89 INJECTION, SOLUTION INTRAVASCULAR ONCE
Status: COMPLETED | OUTPATIENT
Start: 2023-01-01 | End: 2023-01-01

## 2023-01-01 RX ADMIN — SODIUM CHLORIDE 62 ML: 9 INJECTION, SOLUTION INTRAVENOUS at 09:26

## 2023-01-01 RX ADMIN — SODIUM CHLORIDE 61 ML: 9 INJECTION, SOLUTION INTRAVENOUS at 08:44

## 2023-01-01 RX ADMIN — IOPAMIDOL 89 ML: 755 INJECTION, SOLUTION INTRAVENOUS at 16:17

## 2023-01-01 RX ADMIN — IOPAMIDOL 82 ML: 755 INJECTION, SOLUTION INTRAVENOUS at 08:44

## 2023-01-01 RX ADMIN — SODIUM CHLORIDE 67 ML: 9 INJECTION, SOLUTION INTRAVENOUS at 16:18

## 2023-01-01 RX ADMIN — IOPAMIDOL 82 ML: 755 INJECTION, SOLUTION INTRAVENOUS at 09:26

## 2023-01-01 ASSESSMENT — PATIENT HEALTH QUESTIONNAIRE - PHQ9
SUM OF ALL RESPONSES TO PHQ QUESTIONS 1-9: 7
SUM OF ALL RESPONSES TO PHQ QUESTIONS 1-9: 7
10. IF YOU CHECKED OFF ANY PROBLEMS, HOW DIFFICULT HAVE THESE PROBLEMS MADE IT FOR YOU TO DO YOUR WORK, TAKE CARE OF THINGS AT HOME, OR GET ALONG WITH OTHER PEOPLE: NOT DIFFICULT AT ALL

## 2023-10-20 NOTE — TELEPHONE ENCOUNTER
Called and spoke with patient who is willing to get appointment. Appointment scheduled.    Appointments in Next Year      Oct 23, 2023  2:00 PM  (Arrive by 1:40 PM)  Provider Visit with Alicia Munguia MD  [facility] ([facility] ) [phone]          Thank you,  Jaren Keen, Triage RN [facility]  12:42 PM 10/20/2023

## 2023-10-23 NOTE — PROGRESS NOTES
"  Assessment & Plan     Elevated glucose  In setting of pancreatic cancer if A1c significant elevated will need to check best way to treat in light of chemo drugs and pancreatic cancer   - Hemoglobin A1c; Future  - Comprehensive metabolic panel (BMP + Alb, Alk Phos, ALT, AST, Total. Bili, TP); Future  - Hemoglobin A1c  - Comprehensive metabolic panel (BMP + Alb, Alk Phos, ALT, AST, Total. Bili, TP)    Malignant neoplasm of pancreas, unspecified location of malignancy (H)  as above.               Nicotine/Tobacco Cessation:  She reports that she has been smoking. She has never used smokeless tobacco.  Nicotine/Tobacco Cessation Plan:   Information offered: Patient not interested at this time      BMI:   Estimated body mass index is 28.14 kg/m  as calculated from the following:    Height as of this encounter: 1.689 m (5' 6.5\").    Weight as of this encounter: 80.3 kg (177 lb).         Alicia Munguia MD  [facility] RAOUL Garibay is a 63 year old, presenting for the following health issues:  Hyperglycemia      10/23/2023     1:45 PM   Additional Questions   Roomed by VERNA Pulido LPN   Accompanied by self         10/23/2023     1:45 PM   Patient Reported Additional Medications   Patient reports taking the following new medications none       Hyperglycemia    History of Present Illness       Reason for visit:  High blood sugar  Symptom onset:  3-7 days ago  Symptom intensity:  Mild  Symptom progression:  Staying the same  Had these symptoms before:  No  What makes it worse:  No  What makes it better:  No    She eats 2-3 servings of fruits and vegetables daily.She consumes 2 sweetened beverage(s) daily. She exercises with enough effort to increase her heart rate 3 or less days per week.   She is taking medications regularly.       She was diagnosed with pancreatic cancer last her and is now and her second round of chemo. Has tolerated it well for the most part. But recently her blood " "sugar was elevated on labs done at [facility]. Her paternal grandfather, father and sister all had type 2 diabetes mellitus. She denies excessive thirst, urination or wt loss. She has chronic diarrhea from her pancreatic cancer.     Review of Systems   Constitutional, HEENT, cardiovascular, pulmonary, GI, , musculoskeletal, neuro, skin, endocrine and psych systems are negative, except as otherwise noted.      Objective    /80   Temp 98.2  F (36.8  C) (Oral)   Resp 20   Ht 1.689 m (5' 6.5\")   Wt 80.3 kg (177 lb)   LMP  (LMP Unknown)   SpO2 100%   Breastfeeding No   BMI 28.14 kg/m    Body mass index is 28.14 kg/m .  Physical Exam   GENERAL: healthy, alert and no distress  NECK: no adenopathy, no asymmetry, masses, or scars and thyroid normal to palpation  RESP: lungs clear to auscultation - no rales, rhonchi or wheezes  CV: regular rate and rhythm, normal S1 S2, no S3 or S4, no murmur, click or rub, no peripheral edema and peripheral pulses strong  ABDOMEN: soft, nontender, no hepatosplenomegaly, no masses and bowel sounds normal  MS: no gross musculoskeletal defects noted, no edema                "

## 2023-10-24 NOTE — TELEPHONE ENCOUNTER
"Dr. Munguia requesting RN call patient regarding elevated A1c. Per Dr. Munguia's 10/23/23 A1c result note:     \"Please call her and let her know her A1C is 10. normal is <6. After discussion with MTM our best bet is to start her on long acting insulin Lantis or equivalent at 12 units at bedtime. Every 5-7 days she should go up by 2 units until am blood sugars are under 180. We also need to teach her how to give herself the shots and test her blood sugars. We also need to set her up with diabetic educator. Orders have been placed.\"     Dr. Munguia would like patient to start on insulin and glucose testing ASAP. Verbal order received to place orders for glucose testing supplies to test blood sugars 2 times a day before breakfast and evening meal and insulin pen needles to use with Lantus insulin.     Called patient and informed her of recommendations from Dr. Munguia. Patient verbalizes understanding, scheduled for RN only appointment tomorrow to learn how to inject insulin and test blood sugars. Patient advised to  supplies from [facility] pharmacy for insulin injection and glucose testing to bring to nurse only appointment. Informed patient she will need to purchase alcohol swabs OTC for insulin injections.     Appointments in Next Year      Oct 25, 2023  3:00 PM  Nurse Visit with Brenda Cool  [facility] ([facility] ) [phone]          Connie Newton RN  [facility]    "

## 2023-10-25 NOTE — LETTER
"October 25, 2023      Lani Walker  [address] [facility] [address]        Dear Lani,     Per Dr. Munguia, you will be giving yourself the Lantus insulin or equivalent starting at 12 units at bedtime each day.     Every 5-7 days you should go up by 2 units (in how much Lantus) until your morning blood sugars are under 180.     When you use your Lantus, you will need to \"prime\" the insulin, this means putting the number to \"2\" and releasing the button until it releases/goes back down to \"0\". You will then go to the number of units you will be injecting yourself with.     Per Dr. Munguia, test blood sugars 2 times a day before breakfast and evening meal and insulin pen needles to use with Lantus insulin.        Sincerely,        [facility] Nurse          "

## 2023-10-25 NOTE — PROGRESS NOTES
"Patient here for for nurse only teaching of insulin administration and blood glucose teaching. Patient was advised by primary care provider to start \"Lantus insulin or equivalent at 12 units at bedtime. Every 5-7 days she should go up by 2 units until am blood sugars are under 180. We also need to teach her how to give herself the shots and test her blood sugars.\"    Patient also advised to \"test blood sugars 2 times a day before breakfast and evening meal and insulin pen needles to use with Lantus insulin\".     Patient was shown how to do blood glucose reading, patient performed this herself in clinic. Patient was taught about priming her Lantus prior to use and demonstrate giving herself her first insulin dose.     Printed out info on a letter for patient to refer back to. Instructed patient to contact clinic with any concerns with blood sugar readings or any concerns/questions.     Thank you,  Jaren Keen, Triage RN [facility]  3:51 PM 10/25/2023     "

## 2023-11-02 NOTE — TELEPHONE ENCOUNTER
"Call received from patient stating she was recently stated on insulin. Patient was seen 10/25/23 for a nurse only visit for teaching. Patient states she initially started with 12 units and has increased to 14 units as of yesterday. States the lowest her blood sugar has been 242 (this was fasting). Patient is checking blood sugars twice daily, once in the am and again at about 4 pm. Patient did have a chemo treatment on 10/30 and was throwing up all day so patient does not know if that may have affected her blood sugars. Lantus insulin order states for patient to take 12-18 units at bedtime. Per 10/23/23 result note patient is to take:  \"Lantus or equivalent at 12 units at bedtime. Every 5-7 days she should go up by 2 units until am blood sugars are under 180\"    Reviewed these instructions with patient. Patient was concerned because she thought this would be a \"miracle drug\" that would work right away. Advised we want to increase her insulin slowly so her blood sugars do not get too low. Advised patient continue to increase insulin as directed. Patient verbalized understanding.   "

## 2023-11-09 NOTE — TELEPHONE ENCOUNTER
[facility] * medication recommendation received via fax     Forms in [facility] mail box for review and signature.

## 2023-11-15 NOTE — TELEPHONE ENCOUNTER
PRIOR AUTHORIZATION DENIED    Medication: FREESTYLE AMI 3 SENSOR [facility] [place]  Ins[place] Company: [employer] - Phone [phone] Fax [phone]  Denial Date: 11/15/2023  Denial Rational:     Appeal Information:     Patient Notified: No

## 2023-11-16 NOTE — PROGRESS NOTES
Diabetes Education Follow-up    Subjective/Objective:  Type of Service: Telephone Visit  Originating Location (Patient Location): Home  Distant Location (Provider Location): Offsite  Mode of Communication:  Telephone  Telephone Visit Start Time:  101  Telephone Visit End Time (telephone visit stop time): 124      Diabetes is being managed with Lifestyle (diet/activity), Diabetes Medications   Diabetes Medication(s)       Insulin       insulin glargine (LANTUS PEN) 100 UNIT/ML pen    Inject 12-18 Units Subcutaneous at bedtime          Lab Results   Component Value Date    A1C 10.0 10/23/2023    A1C 6.0 06/02/2022    A1C 5.9 11/18/2021         Assessment:  New diagnosis and Lani has some knowledge with her father having had Diabetes.   Has successfully started the insulin and is at 16 units with much improvement in blood sugars.   Reviewed foods, labeling, balance of macronutrients, complex carbs, fiber etc.    Monitoring:   FreeStyle Mary Lou 3 not covered - discussed free trial & cash pay option for intermittent use, re-run in 2024.  If not covered she will likely cash pay and wear it on and off   Fasting blood sugars improved: 135 to 148 in the morning now   Add in a few pre dinner taras   Reviewed how to assess meals with 2 hour post prandial if curious - make sure hands are clean for accuracy     Other   After chemo the fasting blood sugars was 191   She will check with oncology if chemo has steroids     Food notes-   Breakfast - bowl of cheerios + stevia + banana OR scrambled egg on wheat toast   Lunch - half a tuna sandwich on wheat bread   Apples  / oranges    drinking water   Drinking 1 diet coke day   Cut out all sweets    Lost weight with changing diet   Condiments - balsamice vinegarette, very small amount of blue cheese, loves ketchup and will try mixing with NSA ketchup       Plan/Response:  Hold insulin at 16 units for now; if not going low could consider going up a couple more units but is currently  close to goal   Add in a few pre dinner checks  Continue with positive diet & lifestyle changes   Set follow up 1/10    Jennifer King RD, LD, [facility]  Diabetes Education  Time spent: 23 minutes     A copy of this encounter was shared with the provider.

## 2023-11-16 NOTE — LETTER
11/16/2023         RE: Lani Walker  [address]  [place] [address]        Dear Colleague,    Thank you for referring your patient, Lani Walker, to the [facility] DIABETES EDUCATION [place]. Please see a copy of my visit note below.    Diabetes Education Follow-up    Subjective/Objective:  Type of Service: Telephone Visit  Originating Location (Patient Location): Home  Distant Location (Provider Location): Offsite  Mode of Communication:  Telephone  Telephone Visit Start Time:  101  Telephone Visit End Time (telephone visit stop time): 124      Diabetes is being managed with Lifestyle (diet/activity), Diabetes Medications   Diabetes Medication(s)       Insulin       insulin glargine (LANTUS PEN) 100 UNIT/ML pen    Inject 12-18 Units Subcutaneous at bedtime          Lab Results   Component Value Date    A1C 10.0 10/23/2023    A1C 6.0 06/02/2022    A1C 5.9 11/18/2021         Assessment:  New diagnosis and Lani has some knowledge with her father having had Diabetes.   Has successfully started the insulin and is at 16 units with much improvement in blood sugars.   Reviewed foods, labeling, balance of macronutrients, complex carbs, fiber etc.    Monitoring:   FreeStyle Mary Lou 3 not covered - discussed free trial & cash pay option for intermittent use, re-run in 2024.  If not covered she will likely cash pay and wear it on and off   Fasting blood sugars improved: 135 to 148 in the morning now   Add in a few pre dinner taras   Reviewed how to assess meals with 2 hour post prandial if curious - make sure hands are clean for accuracy     Other   After chemo the fasting blood sugars was 191   She will check with oncology if chemo has steroids     Food notes-   Breakfast - bowl of cheerios + stevia + banana OR scrambled egg on wheat toast   Lunch - half a tuna sandwich on wheat bread   Apples  / oranges    drinking water   Drinking 1 diet coke day   Cut out all sweets    Lost weight with  changing diet   Condiments - balsamice vinegarette, very small amount of blue cheese, loves ketchup and will try mixing with NSA ketchup       Plan/Response:  Hold insulin at 16 units for now; if not going low could consider going up a couple more units but is currently close to goal   Add in a few pre dinner checks  Continue with positive diet & lifestyle changes   Set follow up 1/10    Jennifer King RD, LD, [facility]  Diabetes Education  Time spent: 23 minutes     A copy of this encounter was shared with the provider.

## 2023-11-16 NOTE — TELEPHONE ENCOUNTER
Call to patient to let her know. Patient says she will try again January at the start of a new year.     Thank you,  Jaren Keen, Triage RN [facility]  11:28 AM 11/16/2023

## 2023-12-28 NOTE — TELEPHONE ENCOUNTER
Patient Quality Outreach    Patient is due for the following:   Diabetes -  A1C, LDL (Fasting), Eye Exam, Microalbumin, and Foot Exam  Breast Cancer Screening - Mammogram  Physical Preventive Adult Physical      Topic Date Due    Zoster (Shingles) Vaccine (1 of 2) Never done    Pneumococcal Vaccine (2 of 2 - PCV) 02/07/2018    COVID-19 Vaccine (4 - 2023-24 season) 09/01/2023       Next Steps:   Patient has upcoming appointment, these items will be addressed at that time.    Type of outreach:    none      Questions for provider review:    None           Mitul Cardoza MA  Chart routed to none.

## 2024-01-01 ENCOUNTER — APPOINTMENT (OUTPATIENT)
Dept: CT IMAGING | Facility: CLINIC | Age: 64
End: 2024-01-01
Attending: STUDENT IN AN ORGANIZED HEALTH CARE EDUCATION/TRAINING PROGRAM
Payer: COMMERCIAL

## 2024-01-01 ENCOUNTER — APPOINTMENT (OUTPATIENT)
Dept: MRI IMAGING | Facility: CLINIC | Age: 64
End: 2024-01-01
Attending: STUDENT IN AN ORGANIZED HEALTH CARE EDUCATION/TRAINING PROGRAM
Payer: COMMERCIAL

## 2024-01-01 ENCOUNTER — VIRTUAL VISIT (OUTPATIENT)
Dept: EDUCATION SERVICES | Facility: CLINIC | Age: 64
End: 2024-01-01
Payer: COMMERCIAL

## 2024-01-01 ENCOUNTER — HOSPITAL ENCOUNTER (INPATIENT)
Facility: CLINIC | Age: 64
LOS: 9 days | End: 2024-01-19
Attending: EMERGENCY MEDICINE | Admitting: INTERNAL MEDICINE
Payer: COMMERCIAL

## 2024-01-01 ENCOUNTER — HOSPITAL ENCOUNTER (OUTPATIENT)
Facility: CLINIC | Age: 64
Discharge: HOME | End: 2024-01-03
Admitting: INTERNAL MEDICINE
Payer: COMMERCIAL

## 2024-01-01 ENCOUNTER — HOSPITAL ENCOUNTER (OUTPATIENT)
Dept: CARDIOLOGY | Facility: CLINIC | Age: 64
Discharge: HOME | End: 2024-01-10
Attending: INTERNAL MEDICINE | Admitting: INTERNAL MEDICINE
Payer: COMMERCIAL

## 2024-01-01 ENCOUNTER — APPOINTMENT (OUTPATIENT)
Dept: ULTRASOUND IMAGING | Facility: CLINIC | Age: 64
End: 2024-01-01
Attending: EMERGENCY MEDICINE
Payer: COMMERCIAL

## 2024-01-01 ENCOUNTER — APPOINTMENT (OUTPATIENT)
Dept: OCCUPATIONAL THERAPY | Facility: CLINIC | Age: 64
End: 2024-01-01
Attending: STUDENT IN AN ORGANIZED HEALTH CARE EDUCATION/TRAINING PROGRAM
Payer: COMMERCIAL

## 2024-01-01 ENCOUNTER — LAB REQUISITION (OUTPATIENT)
Dept: LAB | Facility: CLINIC | Age: 64
End: 2024-01-01
Payer: COMMERCIAL

## 2024-01-01 ENCOUNTER — APPOINTMENT (OUTPATIENT)
Dept: CT IMAGING | Facility: CLINIC | Age: 64
End: 2024-01-01
Attending: EMERGENCY MEDICINE
Payer: COMMERCIAL

## 2024-01-01 ENCOUNTER — APPOINTMENT (OUTPATIENT)
Dept: ULTRASOUND IMAGING | Facility: CLINIC | Age: 64
End: 2024-01-01
Attending: STUDENT IN AN ORGANIZED HEALTH CARE EDUCATION/TRAINING PROGRAM
Payer: COMMERCIAL

## 2024-01-01 ENCOUNTER — HOSPITAL ENCOUNTER (OUTPATIENT)
Facility: CLINIC | Age: 64
End: 2024-01-01
Admitting: INTERNAL MEDICINE
Payer: COMMERCIAL

## 2024-01-01 ENCOUNTER — TRANSFERRED RECORDS (OUTPATIENT)
Dept: HEALTH INFORMATION MANAGEMENT | Facility: CLINIC | Age: 64
End: 2024-01-01
Payer: COMMERCIAL

## 2024-01-01 ENCOUNTER — HOSPITAL ENCOUNTER (OUTPATIENT)
Dept: CT IMAGING | Facility: CLINIC | Age: 64
Discharge: HOME | End: 2024-01-03
Attending: NURSE PRACTITIONER
Payer: COMMERCIAL

## 2024-01-01 ENCOUNTER — APPOINTMENT (OUTPATIENT)
Dept: PHYSICAL THERAPY | Facility: CLINIC | Age: 64
End: 2024-01-01
Attending: STUDENT IN AN ORGANIZED HEALTH CARE EDUCATION/TRAINING PROGRAM
Payer: COMMERCIAL

## 2024-01-01 VITALS
WEIGHT: 174.38 LBS | HEIGHT: 66 IN | TEMPERATURE: 97.3 F | HEART RATE: 69 BPM | DIASTOLIC BLOOD PRESSURE: 20 MMHG | SYSTOLIC BLOOD PRESSURE: 55 MMHG | RESPIRATION RATE: 28 BRPM | OXYGEN SATURATION: 94 % | BODY MASS INDEX: 28.03 KG/M2

## 2024-01-01 DIAGNOSIS — D72.829 LEUKOCYTOSIS, UNSPECIFIED TYPE: ICD-10-CM

## 2024-01-01 DIAGNOSIS — C25.1: ICD-10-CM

## 2024-01-01 DIAGNOSIS — R06.00 DYSPNEA: ICD-10-CM

## 2024-01-01 DIAGNOSIS — C78.7 SECONDARY MALIGNANT NEOPLASM OF LIVER (H): ICD-10-CM

## 2024-01-01 DIAGNOSIS — C25.2 MALIGNANT NEOPLASM OF TAIL OF PANCREAS (H): Primary | ICD-10-CM

## 2024-01-01 DIAGNOSIS — E11.9 DIABETES MELLITUS (H): Primary | ICD-10-CM

## 2024-01-01 DIAGNOSIS — R06.02 SHORTNESS OF BREATH: ICD-10-CM

## 2024-01-01 DIAGNOSIS — E83.52 HYPERCALCEMIA: ICD-10-CM

## 2024-01-01 DIAGNOSIS — C25.1 MALIGNANT NEOPLASM OF BODY OF PANCREAS (H): ICD-10-CM

## 2024-01-01 DIAGNOSIS — R17 ELEVATED BILIRUBIN: ICD-10-CM

## 2024-01-01 DIAGNOSIS — G93.41 METABOLIC ENCEPHALOPATHY: ICD-10-CM

## 2024-01-01 LAB
ABO/RH(D): NORMAL
ABSOLUTE NEUTROPHILS, BODY FLUID: 520.4 /UL
ALBUMIN BODY FLUID SOURCE: NORMAL
ALBUMIN FLD-MCNC: 1.1 G/DL
ALBUMIN SERPL BCG-MCNC: 2.1 G/DL (ref 3.5–5.2)
ALBUMIN SERPL BCG-MCNC: 2.2 G/DL (ref 3.5–5.2)
ALBUMIN SERPL BCG-MCNC: 2.3 G/DL (ref 3.5–5.2)
ALBUMIN SERPL BCG-MCNC: 2.4 G/DL (ref 3.5–5.2)
ALBUMIN SERPL BCG-MCNC: 2.4 G/DL (ref 3.5–5.2)
ALBUMIN SERPL BCG-MCNC: 2.9 G/DL (ref 3.5–5.2)
ALBUMIN UR-MCNC: 30 MG/DL
ALP SERPL-CCNC: 214 U/L (ref 40–150)
ALP SERPL-CCNC: 221 U/L (ref 40–150)
ALP SERPL-CCNC: 223 U/L (ref 40–150)
ALP SERPL-CCNC: 239 U/L (ref 40–150)
ALP SERPL-CCNC: 240 U/L (ref 40–150)
ALP SERPL-CCNC: 296 U/L (ref 40–150)
ALT SERPL W P-5'-P-CCNC: 37 U/L (ref 0–50)
ALT SERPL W P-5'-P-CCNC: 43 U/L (ref 0–50)
ALT SERPL W P-5'-P-CCNC: 46 U/L (ref 0–50)
ALT SERPL W P-5'-P-CCNC: 47 U/L (ref 0–50)
ALT SERPL W P-5'-P-CCNC: 48 U/L (ref 0–50)
ALT SERPL W P-5'-P-CCNC: 48 U/L (ref 0–50)
AMMONIA PLAS-SCNC: 42 UMOL/L (ref 11–51)
AMMONIA PLAS-SCNC: 45 UMOL/L (ref 11–51)
ANION GAP SERPL CALCULATED.3IONS-SCNC: 13 MMOL/L (ref 7–15)
ANION GAP SERPL CALCULATED.3IONS-SCNC: 14 MMOL/L (ref 7–15)
ANION GAP SERPL CALCULATED.3IONS-SCNC: 15 MMOL/L (ref 7–15)
ANION GAP SERPL CALCULATED.3IONS-SCNC: 15 MMOL/L (ref 7–15)
ANION GAP SERPL CALCULATED.3IONS-SCNC: 16 MMOL/L (ref 7–15)
ANION GAP SERPL CALCULATED.3IONS-SCNC: 16 MMOL/L (ref 7–15)
ANTIBODY SCREEN: NEGATIVE
APPEARANCE FLD: ABNORMAL
APPEARANCE UR: ABNORMAL
APTT PPP: 29 SECONDS (ref 22–38)
AST SERPL W P-5'-P-CCNC: 128 U/L (ref 0–45)
AST SERPL W P-5'-P-CCNC: 136 U/L (ref 0–45)
AST SERPL W P-5'-P-CCNC: 148 U/L (ref 0–45)
AST SERPL W P-5'-P-CCNC: 150 U/L (ref 0–45)
AST SERPL W P-5'-P-CCNC: 153 U/L (ref 0–45)
AST SERPL W P-5'-P-CCNC: 169 U/L (ref 0–45)
ATRIAL RATE - MUSE: 114 BPM
BACTERIA #/AREA URNS HPF: ABNORMAL /HPF
BACTERIA BLD CULT: NO GROWTH
BACTERIA UR CULT: NORMAL
BASOPHILS # BLD AUTO: 0.1 10E3/UL (ref 0–0.2)
BASOPHILS # BLD AUTO: ABNORMAL 10*3/UL
BASOPHILS # BLD MANUAL: 0 10E3/UL (ref 0–0.2)
BASOPHILS NFR BLD AUTO: 0 %
BASOPHILS NFR BLD AUTO: ABNORMAL %
BASOPHILS NFR BLD MANUAL: 0 %
BILIRUB DIRECT SERPL-MCNC: 3.51 MG/DL (ref 0–0.3)
BILIRUB SERPL-MCNC: 4.4 MG/DL
BILIRUB SERPL-MCNC: 4.8 MG/DL
BILIRUB SERPL-MCNC: 4.8 MG/DL
BILIRUB SERPL-MCNC: 5.2 MG/DL
BILIRUB SERPL-MCNC: 5.9 MG/DL
BILIRUB SERPL-MCNC: 6.5 MG/DL
BILIRUB UR QL STRIP: ABNORMAL
BUN SERPL-MCNC: 12.7 MG/DL (ref 8–23)
BUN SERPL-MCNC: 14.2 MG/DL (ref 8–23)
BUN SERPL-MCNC: 21.1 MG/DL (ref 8–23)
BUN SERPL-MCNC: 29.8 MG/DL (ref 8–23)
BUN SERPL-MCNC: 38.4 MG/DL (ref 8–23)
BUN SERPL-MCNC: 43.8 MG/DL (ref 8–23)
BURR CELLS BLD QL SMEAR: SLIGHT
CA-I BLD-MCNC: 7.8 MG/DL (ref 4.4–5.2)
CALCIUM SERPL-MCNC: 10.1 MG/DL (ref 8.8–10.2)
CALCIUM SERPL-MCNC: 10.7 MG/DL (ref 8.8–10.2)
CALCIUM SERPL-MCNC: 11.1 MG/DL (ref 8.8–10.2)
CALCIUM SERPL-MCNC: 12 MG/DL (ref 8.8–10.2)
CALCIUM SERPL-MCNC: 13.9 MG/DL (ref 8.8–10.2)
CALCIUM SERPL-MCNC: 9.4 MG/DL (ref 8.8–10.2)
CAOX CRY #/AREA URNS HPF: ABNORMAL /HPF
CELL COUNT BODY FLUID SOURCE: ABNORMAL
CHLORIDE SERPL-SCNC: 100 MMOL/L (ref 98–107)
CHLORIDE SERPL-SCNC: 102 MMOL/L (ref 98–107)
CHLORIDE SERPL-SCNC: 102 MMOL/L (ref 98–107)
CHLORIDE SERPL-SCNC: 103 MMOL/L (ref 98–107)
CHLORIDE SERPL-SCNC: 104 MMOL/L (ref 98–107)
CHLORIDE SERPL-SCNC: 91 MMOL/L (ref 98–107)
COLOR FLD: YELLOW
COLOR UR AUTO: ABNORMAL
CREAT BLD-MCNC: 0.5 MG/DL (ref 0.5–1)
CREAT SERPL-MCNC: 0.54 MG/DL (ref 0.51–0.95)
CREAT SERPL-MCNC: 0.68 MG/DL (ref 0.51–0.95)
CREAT SERPL-MCNC: 1.16 MG/DL (ref 0.51–0.95)
CREAT SERPL-MCNC: 1.47 MG/DL (ref 0.51–0.95)
CREAT SERPL-MCNC: 1.5 MG/DL (ref 0.51–0.95)
CREAT SERPL-MCNC: 1.77 MG/DL (ref 0.51–0.95)
CREAT SERPL-MCNC: 2.02 MG/DL (ref 0.51–0.95)
CREAT SERPL-MCNC: 2.41 MG/DL (ref 0.51–0.95)
DEPRECATED HCO3 PLAS-SCNC: 15 MMOL/L (ref 22–29)
DEPRECATED HCO3 PLAS-SCNC: 17 MMOL/L (ref 22–29)
DEPRECATED HCO3 PLAS-SCNC: 20 MMOL/L (ref 22–29)
DEPRECATED HCO3 PLAS-SCNC: 23 MMOL/L (ref 22–29)
DIASTOLIC BLOOD PRESSURE - MUSE: NORMAL MMHG
EGFRCR SERPLBLD CKD-EPI 2021: 22 ML/MIN/1.73M2
EGFRCR SERPLBLD CKD-EPI 2021: 27 ML/MIN/1.73M2
EGFRCR SERPLBLD CKD-EPI 2021: 32 ML/MIN/1.73M2
EGFRCR SERPLBLD CKD-EPI 2021: 39 ML/MIN/1.73M2
EGFRCR SERPLBLD CKD-EPI 2021: 40 ML/MIN/1.73M2
EGFRCR SERPLBLD CKD-EPI 2021: 53 ML/MIN/1.73M2
EGFRCR SERPLBLD CKD-EPI 2021: >60 ML/MIN/1.73M2
EGFRCR SERPLBLD CKD-EPI 2021: >90 ML/MIN/1.73M2
EGFRCR SERPLBLD CKD-EPI 2021: >90 ML/MIN/1.73M2
EOSINOPHIL # BLD AUTO: 0 10E3/UL (ref 0–0.7)
EOSINOPHIL # BLD AUTO: ABNORMAL 10*3/UL
EOSINOPHIL # BLD MANUAL: 0 10E3/UL (ref 0–0.7)
EOSINOPHIL NFR BLD AUTO: 0 %
EOSINOPHIL NFR BLD AUTO: ABNORMAL %
EOSINOPHIL NFR BLD MANUAL: 0 %
ERYTHROCYTE [DISTWIDTH] IN BLOOD BY AUTOMATED COUNT: 22 % (ref 10–15)
ERYTHROCYTE [DISTWIDTH] IN BLOOD BY AUTOMATED COUNT: 22.4 % (ref 10–15)
ERYTHROCYTE [DISTWIDTH] IN BLOOD BY AUTOMATED COUNT: 23.9 % (ref 10–15)
ERYTHROCYTE [DISTWIDTH] IN BLOOD BY AUTOMATED COUNT: 24.8 % (ref 10–15)
ERYTHROCYTE [DISTWIDTH] IN BLOOD BY AUTOMATED COUNT: 26.2 % (ref 10–15)
ERYTHROCYTE [DISTWIDTH] IN BLOOD BY AUTOMATED COUNT: 27.1 % (ref 10–15)
FLUAV RNA SPEC QL NAA+PROBE: NEGATIVE
FLUBV RNA RESP QL NAA+PROBE: NEGATIVE
GLUCOSE BLDC GLUCOMTR-MCNC: 100 MG/DL (ref 70–99)
GLUCOSE BLDC GLUCOMTR-MCNC: 107 MG/DL (ref 70–99)
GLUCOSE BLDC GLUCOMTR-MCNC: 115 MG/DL (ref 70–99)
GLUCOSE BLDC GLUCOMTR-MCNC: 122 MG/DL (ref 70–99)
GLUCOSE BLDC GLUCOMTR-MCNC: 124 MG/DL (ref 70–99)
GLUCOSE BLDC GLUCOMTR-MCNC: 126 MG/DL (ref 70–99)
GLUCOSE BLDC GLUCOMTR-MCNC: 132 MG/DL (ref 70–99)
GLUCOSE BLDC GLUCOMTR-MCNC: 143 MG/DL (ref 70–99)
GLUCOSE BLDC GLUCOMTR-MCNC: 151 MG/DL (ref 70–99)
GLUCOSE BLDC GLUCOMTR-MCNC: 151 MG/DL (ref 70–99)
GLUCOSE BLDC GLUCOMTR-MCNC: 168 MG/DL (ref 70–99)
GLUCOSE BLDC GLUCOMTR-MCNC: 173 MG/DL (ref 70–99)
GLUCOSE BLDC GLUCOMTR-MCNC: 177 MG/DL (ref 70–99)
GLUCOSE BLDC GLUCOMTR-MCNC: 178 MG/DL (ref 70–99)
GLUCOSE BLDC GLUCOMTR-MCNC: 185 MG/DL (ref 70–99)
GLUCOSE BLDC GLUCOMTR-MCNC: 185 MG/DL (ref 70–99)
GLUCOSE BLDC GLUCOMTR-MCNC: 186 MG/DL (ref 70–99)
GLUCOSE BLDC GLUCOMTR-MCNC: 187 MG/DL (ref 70–99)
GLUCOSE BLDC GLUCOMTR-MCNC: 190 MG/DL (ref 70–99)
GLUCOSE BLDC GLUCOMTR-MCNC: 197 MG/DL (ref 70–99)
GLUCOSE BLDC GLUCOMTR-MCNC: 202 MG/DL (ref 70–99)
GLUCOSE BLDC GLUCOMTR-MCNC: 72 MG/DL (ref 70–99)
GLUCOSE BLDC GLUCOMTR-MCNC: 79 MG/DL (ref 70–99)
GLUCOSE BLDC GLUCOMTR-MCNC: 97 MG/DL (ref 70–99)
GLUCOSE SERPL-MCNC: 103 MG/DL (ref 70–99)
GLUCOSE SERPL-MCNC: 124 MG/DL (ref 70–99)
GLUCOSE SERPL-MCNC: 131 MG/DL (ref 70–99)
GLUCOSE SERPL-MCNC: 179 MG/DL (ref 70–99)
GLUCOSE SERPL-MCNC: 91 MG/DL (ref 70–99)
GLUCOSE SERPL-MCNC: 91 MG/DL (ref 70–99)
GLUCOSE UR STRIP-MCNC: NEGATIVE MG/DL
GRANULAR CAST: 3 /LPF
HCT VFR BLD AUTO: 25 % (ref 35–47)
HCT VFR BLD AUTO: 25.4 % (ref 35–47)
HCT VFR BLD AUTO: 25.9 % (ref 35–47)
HCT VFR BLD AUTO: 26.8 % (ref 35–47)
HCT VFR BLD AUTO: 27.1 % (ref 35–47)
HCT VFR BLD AUTO: 28.4 % (ref 35–47)
HGB BLD-MCNC: 8.1 G/DL (ref 11.7–15.7)
HGB BLD-MCNC: 8.1 G/DL (ref 11.7–15.7)
HGB BLD-MCNC: 8.3 G/DL (ref 11.7–15.7)
HGB BLD-MCNC: 8.4 G/DL (ref 11.7–15.7)
HGB BLD-MCNC: 8.5 G/DL (ref 11.7–15.7)
HGB BLD-MCNC: 9.2 G/DL (ref 11.7–15.7)
HGB UR QL STRIP: NEGATIVE
HYALINE CASTS: 53 /LPF
IMM GRANULOCYTES # BLD: 1.5 10E3/UL
IMM GRANULOCYTES # BLD: ABNORMAL 10*3/UL
IMM GRANULOCYTES NFR BLD: 4 %
IMM GRANULOCYTES NFR BLD: ABNORMAL %
INR PPP: 1.3 (ref 0.85–1.15)
INR PPP: 1.39 (ref 0.85–1.15)
INTERPRETATION ECG - MUSE: NORMAL
KETONES UR STRIP-MCNC: ABNORMAL MG/DL
LACTATE SERPL-SCNC: 2.6 MMOL/L (ref 0.7–2)
LACTATE SERPL-SCNC: 2.8 MMOL/L (ref 0.7–2)
LACTATE SERPL-SCNC: 2.9 MMOL/L (ref 0.7–2)
LACTATE SERPL-SCNC: 3 MMOL/L (ref 0.7–2)
LACTATE SERPL-SCNC: 3.1 MMOL/L (ref 0.7–2)
LACTATE SERPL-SCNC: 3.3 MMOL/L (ref 0.7–2)
LACTATE SERPL-SCNC: 3.4 MMOL/L (ref 0.7–2)
LACTATE SERPL-SCNC: 3.8 MMOL/L (ref 0.7–2)
LEUKOCYTE ESTERASE UR QL STRIP: NEGATIVE
LIPASE SERPL-CCNC: 455 U/L (ref 13–60)
LVEF ECHO: NORMAL
LYMPHOCYTES # BLD AUTO: 1.5 10E3/UL (ref 0.8–5.3)
LYMPHOCYTES # BLD AUTO: ABNORMAL 10*3/UL
LYMPHOCYTES # BLD MANUAL: 2.9 10E3/UL (ref 0.8–5.3)
LYMPHOCYTES NFR BLD AUTO: 4 %
LYMPHOCYTES NFR BLD AUTO: ABNORMAL %
LYMPHOCYTES NFR BLD MANUAL: 7 %
LYMPHOCYTES NFR FLD MANUAL: 8 %
MAGNESIUM SERPL-MCNC: 1.9 MG/DL (ref 1.7–2.3)
MCH RBC QN AUTO: 29.5 PG (ref 26.5–33)
MCH RBC QN AUTO: 29.5 PG (ref 26.5–33)
MCH RBC QN AUTO: 29.6 PG (ref 26.5–33)
MCH RBC QN AUTO: 29.9 PG (ref 26.5–33)
MCH RBC QN AUTO: 30.2 PG (ref 26.5–33)
MCH RBC QN AUTO: 30.7 PG (ref 26.5–33)
MCHC RBC AUTO-ENTMCNC: 31 G/DL (ref 31.5–36.5)
MCHC RBC AUTO-ENTMCNC: 31.7 G/DL (ref 31.5–36.5)
MCHC RBC AUTO-ENTMCNC: 31.9 G/DL (ref 31.5–36.5)
MCHC RBC AUTO-ENTMCNC: 32 G/DL (ref 31.5–36.5)
MCHC RBC AUTO-ENTMCNC: 32.4 G/DL (ref 31.5–36.5)
MCHC RBC AUTO-ENTMCNC: 32.4 G/DL (ref 31.5–36.5)
MCV RBC AUTO: 91 FL (ref 78–100)
MCV RBC AUTO: 91 FL (ref 78–100)
MCV RBC AUTO: 93 FL (ref 78–100)
MCV RBC AUTO: 95 FL (ref 78–100)
MCV RBC AUTO: 96 FL (ref 78–100)
MCV RBC AUTO: 96 FL (ref 78–100)
MONOCYTES # BLD AUTO: 3.5 10E3/UL (ref 0–1.3)
MONOCYTES # BLD AUTO: ABNORMAL 10*3/UL
MONOCYTES # BLD MANUAL: 3.7 10E3/UL (ref 0–1.3)
MONOCYTES NFR BLD AUTO: 9 %
MONOCYTES NFR BLD AUTO: ABNORMAL %
MONOCYTES NFR BLD MANUAL: 9 %
MONOS+MACROS NFR FLD MANUAL: 29 %
MUCOUS THREADS #/AREA URNS LPF: PRESENT /LPF
NEUTROPHILS # BLD AUTO: 33.7 10E3/UL (ref 1.6–8.3)
NEUTROPHILS # BLD AUTO: ABNORMAL 10*3/UL
NEUTROPHILS # BLD MANUAL: 34.9 10E3/UL (ref 1.6–8.3)
NEUTROPHILS NFR BLD AUTO: 83 %
NEUTROPHILS NFR BLD AUTO: ABNORMAL %
NEUTROPHILS NFR BLD MANUAL: 84 %
NEUTS BAND NFR FLD MANUAL: 63 %
NITRATE UR QL: NEGATIVE
NRBC # BLD AUTO: 0 10E3/UL
NRBC # BLD AUTO: 0.2 10E3/UL
NRBC BLD AUTO-RTO: 0 /100
NRBC BLD AUTO-RTO: 1 /100
P AXIS - MUSE: 49 DEGREES
PH UR STRIP: 5 [PH] (ref 5–7)
PHOSPHATE SERPL-MCNC: 2.3 MG/DL (ref 2.5–4.5)
PLAT MORPH BLD: ABNORMAL
PLATELET # BLD AUTO: 173 10E3/UL (ref 150–450)
PLATELET # BLD AUTO: 181 10E3/UL (ref 150–450)
PLATELET # BLD AUTO: 219 10E3/UL (ref 150–450)
PLATELET # BLD AUTO: 259 10E3/UL (ref 150–450)
PLATELET # BLD AUTO: 306 10E3/UL (ref 150–450)
PLATELET # BLD AUTO: 306 10E3/UL (ref 150–450)
PLATELET # BLD AUTO: 333 10E3/UL (ref 150–450)
PLATELET # BLD AUTO: 334 10E3/UL (ref 150–450)
PLATELET # BLD AUTO: 356 10E3/UL (ref 150–450)
PLATELET # BLD AUTO: 416 10E3/UL (ref 150–450)
POTASSIUM SERPL-SCNC: 4 MMOL/L (ref 3.4–5.3)
POTASSIUM SERPL-SCNC: 4.3 MMOL/L (ref 3.4–5.3)
POTASSIUM SERPL-SCNC: 4.5 MMOL/L (ref 3.4–5.3)
POTASSIUM SERPL-SCNC: 4.5 MMOL/L (ref 3.4–5.3)
POTASSIUM SERPL-SCNC: 4.6 MMOL/L (ref 3.4–5.3)
POTASSIUM SERPL-SCNC: 4.8 MMOL/L (ref 3.4–5.3)
PR INTERVAL - MUSE: 148 MS
PROT FLD-MCNC: 1.9 G/DL
PROT SERPL-MCNC: 4.9 G/DL (ref 6.4–8.3)
PROT SERPL-MCNC: 5 G/DL (ref 6.4–8.3)
PROT SERPL-MCNC: 5.1 G/DL (ref 6.4–8.3)
PROT SERPL-MCNC: 5.1 G/DL (ref 6.4–8.3)
PROT SERPL-MCNC: 5.2 G/DL (ref 6.4–8.3)
PROT SERPL-MCNC: 6 G/DL (ref 6.4–8.3)
PROTEIN BODY FLUID SOURCE: NORMAL
QRS DURATION - MUSE: 80 MS
QT - MUSE: 298 MS
QTC - MUSE: 410 MS
R AXIS - MUSE: 24 DEGREES
RADIOLOGIST FLAGS: ABNORMAL
RBC # BLD AUTO: 2.7 10E6/UL (ref 3.8–5.2)
RBC # BLD AUTO: 2.74 10E6/UL (ref 3.8–5.2)
RBC # BLD AUTO: 2.75 10E6/UL (ref 3.8–5.2)
RBC # BLD AUTO: 2.81 10E6/UL (ref 3.8–5.2)
RBC # BLD AUTO: 2.81 10E6/UL (ref 3.8–5.2)
RBC # BLD AUTO: 3.12 10E6/UL (ref 3.8–5.2)
RBC MORPH BLD: ABNORMAL
RBC URINE: 2 /HPF
RSV RNA SPEC NAA+PROBE: NEGATIVE
SARS-COV-2 RNA RESP QL NAA+PROBE: NEGATIVE
SODIUM SERPL-SCNC: 130 MMOL/L (ref 135–145)
SODIUM SERPL-SCNC: 131 MMOL/L (ref 135–145)
SODIUM SERPL-SCNC: 134 MMOL/L (ref 135–145)
SODIUM SERPL-SCNC: 134 MMOL/L (ref 135–145)
SODIUM SERPL-SCNC: 135 MMOL/L (ref 135–145)
SODIUM SERPL-SCNC: 136 MMOL/L (ref 135–145)
SP GR UR STRIP: 1.02 (ref 1–1.03)
SPECIMEN EXPIRATION DATE: NORMAL
SQUAMOUS EPITHELIAL: 4 /HPF
SYSTOLIC BLOOD PRESSURE - MUSE: NORMAL MMHG
T AXIS - MUSE: 52 DEGREES
TARGETS BLD QL SMEAR: SLIGHT
URATE SERPL-MCNC: 6.5 MG/DL (ref 2.4–5.7)
UROBILINOGEN UR STRIP-MCNC: 8 MG/DL
VANCOMYCIN SERPL-MCNC: 21 UG/ML
VENTRICULAR RATE- MUSE: 114 BPM
WBC # BLD AUTO: 40.4 10E3/UL (ref 4–11)
WBC # BLD AUTO: 41.6 10E3/UL (ref 4–11)
WBC # BLD AUTO: 42.8 10E3/UL (ref 4–11)
WBC # BLD AUTO: 46.8 10E3/UL (ref 4–11)
WBC # BLD AUTO: 46.9 10E3/UL (ref 4–11)
WBC # BLD AUTO: 47.5 10E3/UL (ref 4–11)
WBC # FLD AUTO: 826 /UL
WBC URINE: 1 /HPF

## 2024-01-01 PROCEDURE — 250N000013 HC RX MED GY IP 250 OP 250 PS 637: Performed by: INTERNAL MEDICINE

## 2024-01-01 PROCEDURE — 99207 PR NO BILLABLE SERVICE THIS VISIT: CPT | Performed by: STUDENT IN AN ORGANIZED HEALTH CARE EDUCATION/TRAINING PROGRAM

## 2024-01-01 PROCEDURE — 250N000011 HC RX IP 250 OP 636: Performed by: INTERNAL MEDICINE

## 2024-01-01 PROCEDURE — 93306 TTE W/DOPPLER COMPLETE: CPT | Mod: 26 | Performed by: INTERNAL MEDICINE

## 2024-01-01 PROCEDURE — 84550 ASSAY OF BLOOD/URIC ACID: CPT | Performed by: STUDENT IN AN ORGANIZED HEALTH CARE EDUCATION/TRAINING PROGRAM

## 2024-01-01 PROCEDURE — 71250 CT THORAX DX C-: CPT

## 2024-01-01 PROCEDURE — 36415 COLL VENOUS BLD VENIPUNCTURE: CPT | Performed by: STUDENT IN AN ORGANIZED HEALTH CARE EDUCATION/TRAINING PROGRAM

## 2024-01-01 PROCEDURE — 250N000013 HC RX MED GY IP 250 OP 250 PS 637: Performed by: SOCIAL WORKER

## 2024-01-01 PROCEDURE — 99285 EMERGENCY DEPT VISIT HI MDM: CPT | Mod: 25

## 2024-01-01 PROCEDURE — 99232 SBSQ HOSP IP/OBS MODERATE 35: CPT | Performed by: STUDENT IN AN ORGANIZED HEALTH CARE EDUCATION/TRAINING PROGRAM

## 2024-01-01 PROCEDURE — 97165 OT EVAL LOW COMPLEX 30 MIN: CPT | Mod: GO | Performed by: REHABILITATION PRACTITIONER

## 2024-01-01 PROCEDURE — 80053 COMPREHEN METABOLIC PANEL: CPT | Performed by: EMERGENCY MEDICINE

## 2024-01-01 PROCEDURE — 86900 BLOOD TYPING SEROLOGIC ABO: CPT | Performed by: EMERGENCY MEDICINE

## 2024-01-01 PROCEDURE — 120N000001 HC R&B MED SURG/OB

## 2024-01-01 PROCEDURE — 76705 ECHO EXAM OF ABDOMEN: CPT

## 2024-01-01 PROCEDURE — 99233 SBSQ HOSP IP/OBS HIGH 50: CPT | Performed by: STUDENT IN AN ORGANIZED HEALTH CARE EDUCATION/TRAINING PROGRAM

## 2024-01-01 PROCEDURE — 83605 ASSAY OF LACTIC ACID: CPT | Performed by: STUDENT IN AN ORGANIZED HEALTH CARE EDUCATION/TRAINING PROGRAM

## 2024-01-01 PROCEDURE — 258N000003 HC RX IP 258 OP 636: Performed by: INTERNAL MEDICINE

## 2024-01-01 PROCEDURE — 85027 COMPLETE CBC AUTOMATED: CPT | Performed by: STUDENT IN AN ORGANIZED HEALTH CARE EDUCATION/TRAINING PROGRAM

## 2024-01-01 PROCEDURE — 84100 ASSAY OF PHOSPHORUS: CPT | Performed by: STUDENT IN AN ORGANIZED HEALTH CARE EDUCATION/TRAINING PROGRAM

## 2024-01-01 PROCEDURE — 87040 BLOOD CULTURE FOR BACTERIA: CPT | Performed by: EMERGENCY MEDICINE

## 2024-01-01 PROCEDURE — 250N000013 HC RX MED GY IP 250 OP 250 PS 637: Performed by: NURSE PRACTITIONER

## 2024-01-01 PROCEDURE — 85049 AUTOMATED PLATELET COUNT: CPT | Performed by: PHYSICIAN ASSISTANT

## 2024-01-01 PROCEDURE — 99233 SBSQ HOSP IP/OBS HIGH 50: CPT | Performed by: NURSE PRACTITIONER

## 2024-01-01 PROCEDURE — 255N000002 HC RX 255 OP 636: Performed by: STUDENT IN AN ORGANIZED HEALTH CARE EDUCATION/TRAINING PROGRAM

## 2024-01-01 PROCEDURE — 82565 ASSAY OF CREATININE: CPT

## 2024-01-01 PROCEDURE — 83605 ASSAY OF LACTIC ACID: CPT | Performed by: EMERGENCY MEDICINE

## 2024-01-01 PROCEDURE — 36415 COLL VENOUS BLD VENIPUNCTURE: CPT | Performed by: INTERNAL MEDICINE

## 2024-01-01 PROCEDURE — 85007 BL SMEAR W/DIFF WBC COUNT: CPT | Performed by: STUDENT IN AN ORGANIZED HEALTH CARE EDUCATION/TRAINING PROGRAM

## 2024-01-01 PROCEDURE — 250N000009 HC RX 250: Performed by: INTERNAL MEDICINE

## 2024-01-01 PROCEDURE — 85049 AUTOMATED PLATELET COUNT: CPT | Performed by: STUDENT IN AN ORGANIZED HEALTH CARE EDUCATION/TRAINING PROGRAM

## 2024-01-01 PROCEDURE — 87040 BLOOD CULTURE FOR BACTERIA: CPT | Performed by: STUDENT IN AN ORGANIZED HEALTH CARE EDUCATION/TRAINING PROGRAM

## 2024-01-01 PROCEDURE — 96365 THER/PROPH/DIAG IV INF INIT: CPT

## 2024-01-01 PROCEDURE — 82140 ASSAY OF AMMONIA: CPT | Performed by: EMERGENCY MEDICINE

## 2024-01-01 PROCEDURE — 89050 BODY FLUID CELL COUNT: CPT | Performed by: STUDENT IN AN ORGANIZED HEALTH CARE EDUCATION/TRAINING PROGRAM

## 2024-01-01 PROCEDURE — 250N000011 HC RX IP 250 OP 636: Performed by: STUDENT IN AN ORGANIZED HEALTH CARE EDUCATION/TRAINING PROGRAM

## 2024-01-01 PROCEDURE — 80053 COMPREHEN METABOLIC PANEL: CPT | Performed by: STUDENT IN AN ORGANIZED HEALTH CARE EDUCATION/TRAINING PROGRAM

## 2024-01-01 PROCEDURE — 85730 THROMBOPLASTIN TIME PARTIAL: CPT | Performed by: EMERGENCY MEDICINE

## 2024-01-01 PROCEDURE — 85027 COMPLETE CBC AUTOMATED: CPT | Performed by: INTERNAL MEDICINE

## 2024-01-01 PROCEDURE — 250N000012 HC RX MED GY IP 250 OP 636 PS 637: Performed by: STUDENT IN AN ORGANIZED HEALTH CARE EDUCATION/TRAINING PROGRAM

## 2024-01-01 PROCEDURE — 82962 GLUCOSE BLOOD TEST: CPT

## 2024-01-01 PROCEDURE — 272N000001 HC OR GENERAL SUPPLY STERILE: Performed by: INTERNAL MEDICINE

## 2024-01-01 PROCEDURE — 80202 ASSAY OF VANCOMYCIN: CPT | Performed by: STUDENT IN AN ORGANIZED HEALTH CARE EDUCATION/TRAINING PROGRAM

## 2024-01-01 PROCEDURE — 250N000009 HC RX 250: Performed by: NURSE PRACTITIONER

## 2024-01-01 PROCEDURE — 82565 ASSAY OF CREATININE: CPT | Performed by: INTERNAL MEDICINE

## 2024-01-01 PROCEDURE — 99254 IP/OBS CNSLTJ NEW/EST MOD 60: CPT | Performed by: INTERNAL MEDICINE

## 2024-01-01 PROCEDURE — 36415 COLL VENOUS BLD VENIPUNCTURE: CPT | Performed by: EMERGENCY MEDICINE

## 2024-01-01 PROCEDURE — 99238 HOSP IP/OBS DSCHRG MGMT 30/<: CPT | Performed by: INTERNAL MEDICINE

## 2024-01-01 PROCEDURE — 81001 URINALYSIS AUTO W/SCOPE: CPT | Performed by: EMERGENCY MEDICINE

## 2024-01-01 PROCEDURE — 83605 ASSAY OF LACTIC ACID: CPT | Performed by: INTERNAL MEDICINE

## 2024-01-01 PROCEDURE — 85610 PROTHROMBIN TIME: CPT | Performed by: EMERGENCY MEDICINE

## 2024-01-01 PROCEDURE — 83735 ASSAY OF MAGNESIUM: CPT | Performed by: STUDENT IN AN ORGANIZED HEALTH CARE EDUCATION/TRAINING PROGRAM

## 2024-01-01 PROCEDURE — 80053 COMPREHEN METABOLIC PANEL: CPT | Performed by: INTERNAL MEDICINE

## 2024-01-01 PROCEDURE — 258N000003 HC RX IP 258 OP 636: Performed by: EMERGENCY MEDICINE

## 2024-01-01 PROCEDURE — 250N000009 HC RX 250: Performed by: RADIOLOGY

## 2024-01-01 PROCEDURE — 85049 AUTOMATED PLATELET COUNT: CPT | Performed by: INTERNAL MEDICINE

## 2024-01-01 PROCEDURE — 99232 SBSQ HOSP IP/OBS MODERATE 35: CPT | Performed by: NURSE PRACTITIONER

## 2024-01-01 PROCEDURE — 93005 ELECTROCARDIOGRAM TRACING: CPT

## 2024-01-01 PROCEDURE — 99223 1ST HOSP IP/OBS HIGH 75: CPT | Performed by: INTERNAL MEDICINE

## 2024-01-01 PROCEDURE — 0W9G3ZZ DRAINAGE OF PERITONEAL CAVITY, PERCUTANEOUS APPROACH: ICD-10-PCS | Performed by: RADIOLOGY

## 2024-01-01 PROCEDURE — 99232 SBSQ HOSP IP/OBS MODERATE 35: CPT | Performed by: INTERNAL MEDICINE

## 2024-01-01 PROCEDURE — 99255 IP/OBS CONSLTJ NEW/EST HI 80: CPT | Performed by: NURSE PRACTITIONER

## 2024-01-01 PROCEDURE — 97162 PT EVAL MOD COMPLEX 30 MIN: CPT | Mod: GP

## 2024-01-01 PROCEDURE — A9585 GADOBUTROL INJECTION: HCPCS | Performed by: STUDENT IN AN ORGANIZED HEALTH CARE EDUCATION/TRAINING PROGRAM

## 2024-01-01 PROCEDURE — 97530 THERAPEUTIC ACTIVITIES: CPT | Mod: GP

## 2024-01-01 PROCEDURE — 272N000706 US PARACENTESIS WITHOUT ALBUMIN

## 2024-01-01 PROCEDURE — 82330 ASSAY OF CALCIUM: CPT | Performed by: EMERGENCY MEDICINE

## 2024-01-01 PROCEDURE — 74183 MRI ABD W/O CNTR FLWD CNTR: CPT

## 2024-01-01 PROCEDURE — 71260 CT THORAX DX C+: CPT

## 2024-01-01 PROCEDURE — 93306 TTE W/DOPPLER COMPLETE: CPT

## 2024-01-01 PROCEDURE — 250N000012 HC RX MED GY IP 250 OP 636 PS 637: Performed by: EMERGENCY MEDICINE

## 2024-01-01 PROCEDURE — 85004 AUTOMATED DIFF WBC COUNT: CPT | Performed by: EMERGENCY MEDICINE

## 2024-01-01 PROCEDURE — 70450 CT HEAD/BRAIN W/O DYE: CPT

## 2024-01-01 PROCEDURE — 96361 HYDRATE IV INFUSION ADD-ON: CPT

## 2024-01-01 PROCEDURE — 82042 OTHER SOURCE ALBUMIN QUAN EA: CPT | Performed by: STUDENT IN AN ORGANIZED HEALTH CARE EDUCATION/TRAINING PROGRAM

## 2024-01-01 PROCEDURE — 85610 PROTHROMBIN TIME: CPT | Performed by: PHYSICIAN ASSISTANT

## 2024-01-01 PROCEDURE — 87086 URINE CULTURE/COLONY COUNT: CPT | Performed by: EMERGENCY MEDICINE

## 2024-01-01 PROCEDURE — 87205 SMEAR GRAM STAIN: CPT | Performed by: STUDENT IN AN ORGANIZED HEALTH CARE EDUCATION/TRAINING PROGRAM

## 2024-01-01 PROCEDURE — 84157 ASSAY OF PROTEIN OTHER: CPT | Performed by: STUDENT IN AN ORGANIZED HEALTH CARE EDUCATION/TRAINING PROGRAM

## 2024-01-01 PROCEDURE — 258N000003 HC RX IP 258 OP 636: Performed by: STUDENT IN AN ORGANIZED HEALTH CARE EDUCATION/TRAINING PROGRAM

## 2024-01-01 PROCEDURE — 250N000011 HC RX IP 250 OP 636: Performed by: EMERGENCY MEDICINE

## 2024-01-01 PROCEDURE — 83690 ASSAY OF LIPASE: CPT | Performed by: EMERGENCY MEDICINE

## 2024-01-01 PROCEDURE — 87637 SARSCOV2&INF A&B&RSV AMP PRB: CPT | Performed by: STUDENT IN AN ORGANIZED HEALTH CARE EDUCATION/TRAINING PROGRAM

## 2024-01-01 PROCEDURE — 82140 ASSAY OF AMMONIA: CPT | Performed by: STUDENT IN AN ORGANIZED HEALTH CARE EDUCATION/TRAINING PROGRAM

## 2024-01-01 PROCEDURE — 97530 THERAPEUTIC ACTIVITIES: CPT | Mod: GO | Performed by: REHABILITATION PRACTITIONER

## 2024-01-01 PROCEDURE — 250N000011 HC RX IP 250 OP 636: Mod: JZ | Performed by: INTERNAL MEDICINE

## 2024-01-01 PROCEDURE — 250N000011 HC RX IP 250 OP 636: Performed by: NURSE PRACTITIONER

## 2024-01-01 PROCEDURE — 82565 ASSAY OF CREATININE: CPT | Performed by: STUDENT IN AN ORGANIZED HEALTH CARE EDUCATION/TRAINING PROGRAM

## 2024-01-01 RX ORDER — OXYCODONE HYDROCHLORIDE 5 MG/1
5 TABLET ORAL EVERY 6 HOURS PRN
COMMUNITY

## 2024-01-01 RX ORDER — PIPERACILLIN SODIUM, TAZOBACTAM SODIUM 4; .5 G/20ML; G/20ML
4.5 INJECTION, POWDER, LYOPHILIZED, FOR SOLUTION INTRAVENOUS ONCE
Status: COMPLETED | OUTPATIENT
Start: 2024-01-01 | End: 2024-01-01

## 2024-01-01 RX ORDER — OXYCODONE HYDROCHLORIDE 5 MG/1
5 TABLET ORAL ONCE
Status: COMPLETED | OUTPATIENT
Start: 2024-01-01 | End: 2024-01-01

## 2024-01-01 RX ORDER — NALOXONE HYDROCHLORIDE 0.4 MG/ML
0.2 INJECTION, SOLUTION INTRAMUSCULAR; INTRAVENOUS; SUBCUTANEOUS
Status: CANCELLED | OUTPATIENT
Start: 2024-01-01

## 2024-01-01 RX ORDER — HYDROMORPHONE HCL IN WATER/PF 6 MG/30 ML
0.4 PATIENT CONTROLLED ANALGESIA SYRINGE INTRAVENOUS
Status: DISPENSED | OUTPATIENT
Start: 2024-01-01 | End: 2024-01-01

## 2024-01-01 RX ORDER — FLUOXETINE 10 MG/1
10 CAPSULE ORAL EVERY EVENING
COMMUNITY

## 2024-01-01 RX ORDER — NALOXONE HYDROCHLORIDE 0.4 MG/ML
0.4 INJECTION, SOLUTION INTRAMUSCULAR; INTRAVENOUS; SUBCUTANEOUS
Status: DISCONTINUED | OUTPATIENT
Start: 2024-01-01 | End: 2024-01-01 | Stop reason: HOSPADM

## 2024-01-01 RX ORDER — AMOXICILLIN 250 MG
2 CAPSULE ORAL 2 TIMES DAILY
Status: DISCONTINUED | OUTPATIENT
Start: 2024-01-01 | End: 2024-01-01 | Stop reason: HOSPADM

## 2024-01-01 RX ORDER — DEXTROSE MONOHYDRATE 25 G/50ML
25-50 INJECTION, SOLUTION INTRAVENOUS
Status: DISCONTINUED | OUTPATIENT
Start: 2024-01-01 | End: 2024-01-01 | Stop reason: HOSPADM

## 2024-01-01 RX ORDER — HEPARIN SODIUM,PORCINE 10 UNIT/ML
5-10 VIAL (ML) INTRAVENOUS
Status: DISCONTINUED | OUTPATIENT
Start: 2024-01-01 | End: 2024-01-01 | Stop reason: HOSPADM

## 2024-01-01 RX ORDER — NALOXONE HYDROCHLORIDE 0.4 MG/ML
0.4 INJECTION, SOLUTION INTRAMUSCULAR; INTRAVENOUS; SUBCUTANEOUS
Status: CANCELLED | OUTPATIENT
Start: 2024-01-01

## 2024-01-01 RX ORDER — HEPARIN SODIUM (PORCINE) LOCK FLUSH IV SOLN 100 UNIT/ML 100 UNIT/ML
5-10 SOLUTION INTRAVENOUS
Status: DISCONTINUED | OUTPATIENT
Start: 2024-01-01 | End: 2024-01-01 | Stop reason: HOSPADM

## 2024-01-01 RX ORDER — AMOXICILLIN 250 MG
2 CAPSULE ORAL 2 TIMES DAILY PRN
Status: DISCONTINUED | OUTPATIENT
Start: 2024-01-01 | End: 2024-01-01 | Stop reason: HOSPADM

## 2024-01-01 RX ORDER — LIDOCAINE 40 MG/G
CREAM TOPICAL
Status: CANCELLED | OUTPATIENT
Start: 2024-01-01

## 2024-01-01 RX ORDER — FLUOXETINE 10 MG/1
10 CAPSULE ORAL EVERY EVENING
Status: DISCONTINUED | OUTPATIENT
Start: 2024-01-01 | End: 2024-01-01

## 2024-01-01 RX ORDER — AMOXICILLIN 250 MG
1 CAPSULE ORAL 2 TIMES DAILY PRN
Status: DISCONTINUED | OUTPATIENT
Start: 2024-01-01 | End: 2024-01-01 | Stop reason: HOSPADM

## 2024-01-01 RX ORDER — ACETAMINOPHEN 325 MG/1
650 TABLET ORAL EVERY 8 HOURS PRN
Status: DISCONTINUED | OUTPATIENT
Start: 2024-01-01 | End: 2024-01-01 | Stop reason: HOSPADM

## 2024-01-01 RX ORDER — HYDROMORPHONE HCL IN WATER/PF 6 MG/30 ML
0.2 PATIENT CONTROLLED ANALGESIA SYRINGE INTRAVENOUS
Status: DISPENSED | OUTPATIENT
Start: 2024-01-01 | End: 2024-01-01

## 2024-01-01 RX ORDER — OXYCODONE HYDROCHLORIDE 10 MG/1
10 TABLET ORAL EVERY 4 HOURS PRN
Status: DISCONTINUED | OUTPATIENT
Start: 2024-01-01 | End: 2024-01-01 | Stop reason: HOSPADM

## 2024-01-01 RX ORDER — ENOXAPARIN SODIUM 100 MG/ML
40 INJECTION SUBCUTANEOUS EVERY 24 HOURS
Status: DISCONTINUED | OUTPATIENT
Start: 2024-01-01 | End: 2024-01-01

## 2024-01-01 RX ORDER — ALBUTEROL SULFATE 0.83 MG/ML
2.5 SOLUTION RESPIRATORY (INHALATION)
Status: DISCONTINUED | OUTPATIENT
Start: 2024-01-01 | End: 2024-01-01 | Stop reason: HOSPADM

## 2024-01-01 RX ORDER — LORAZEPAM 0.5 MG/1
0.5 TABLET ORAL EVERY 6 HOURS PRN
COMMUNITY

## 2024-01-01 RX ORDER — OXYCODONE HYDROCHLORIDE 5 MG/1
5 TABLET ORAL EVERY 4 HOURS PRN
Status: DISCONTINUED | OUTPATIENT
Start: 2024-01-01 | End: 2024-01-01 | Stop reason: HOSPADM

## 2024-01-01 RX ORDER — FLUOXETINE 10 MG/1
20 CAPSULE ORAL EVERY MORNING
COMMUNITY

## 2024-01-01 RX ORDER — IOPAMIDOL 755 MG/ML
88 INJECTION, SOLUTION INTRAVASCULAR ONCE
Status: COMPLETED | OUTPATIENT
Start: 2024-01-01 | End: 2024-01-01

## 2024-01-01 RX ORDER — PROCHLORPERAZINE MALEATE 10 MG
10 TABLET ORAL EVERY 6 HOURS PRN
COMMUNITY

## 2024-01-01 RX ORDER — PROCHLORPERAZINE MALEATE 5 MG
10 TABLET ORAL EVERY 6 HOURS PRN
Status: DISCONTINUED | OUTPATIENT
Start: 2024-01-01 | End: 2024-01-01 | Stop reason: HOSPADM

## 2024-01-01 RX ORDER — PROCHLORPERAZINE 25 MG
25 SUPPOSITORY, RECTAL RECTAL EVERY 12 HOURS PRN
Status: DISCONTINUED | OUTPATIENT
Start: 2024-01-01 | End: 2024-01-01 | Stop reason: HOSPADM

## 2024-01-01 RX ORDER — LORAZEPAM 0.5 MG/1
0.5 TABLET ORAL EVERY 6 HOURS PRN
Status: DISCONTINUED | OUTPATIENT
Start: 2024-01-01 | End: 2024-01-01 | Stop reason: HOSPADM

## 2024-01-01 RX ORDER — FLUOXETINE 10 MG/1
10 CAPSULE ORAL
Status: DISCONTINUED | OUTPATIENT
Start: 2024-01-01 | End: 2024-01-01 | Stop reason: HOSPADM

## 2024-01-01 RX ORDER — DIAZEPAM 10 MG/2ML
2.5 INJECTION, SOLUTION INTRAMUSCULAR; INTRAVENOUS EVERY 4 HOURS PRN
Status: DISPENSED | OUTPATIENT
Start: 2024-01-01 | End: 2024-01-01

## 2024-01-01 RX ORDER — LIDOCAINE 40 MG/G
CREAM TOPICAL
Status: DISCONTINUED | OUTPATIENT
Start: 2024-01-01 | End: 2024-01-01 | Stop reason: HOSPADM

## 2024-01-01 RX ORDER — CALCITONIN SALMON 200 [USP'U]/ML
4 INJECTION, SOLUTION INTRAMUSCULAR; SUBCUTANEOUS EVERY 12 HOURS
Status: COMPLETED | OUTPATIENT
Start: 2024-01-01 | End: 2024-01-01

## 2024-01-01 RX ORDER — CALCITONIN SALMON 200 [USP'U]/ML
4 INJECTION, SOLUTION INTRAMUSCULAR; SUBCUTANEOUS ONCE
Qty: 1.46 ML | Refills: 0 | Status: COMPLETED | OUTPATIENT
Start: 2024-01-01 | End: 2024-01-01

## 2024-01-01 RX ORDER — ENOXAPARIN SODIUM 100 MG/ML
30 INJECTION SUBCUTANEOUS EVERY 24 HOURS
Status: DISCONTINUED | OUTPATIENT
Start: 2024-01-01 | End: 2024-01-01 | Stop reason: HOSPADM

## 2024-01-01 RX ORDER — NALOXONE HYDROCHLORIDE 0.4 MG/ML
0.2 INJECTION, SOLUTION INTRAMUSCULAR; INTRAVENOUS; SUBCUTANEOUS
Status: DISCONTINUED | OUTPATIENT
Start: 2024-01-01 | End: 2024-01-01 | Stop reason: HOSPADM

## 2024-01-01 RX ORDER — AMOXICILLIN 250 MG
1 CAPSULE ORAL 2 TIMES DAILY
Status: DISCONTINUED | OUTPATIENT
Start: 2024-01-01 | End: 2024-01-01 | Stop reason: HOSPADM

## 2024-01-01 RX ORDER — NICOTINE POLACRILEX 4 MG
15-30 LOZENGE BUCCAL
Status: DISCONTINUED | OUTPATIENT
Start: 2024-01-01 | End: 2024-01-01 | Stop reason: HOSPADM

## 2024-01-01 RX ORDER — AMOXICILLIN AND CLAVULANATE POTASSIUM 500; 125 MG/1; MG/1
1 TABLET, FILM COATED ORAL EVERY 12 HOURS SCHEDULED
Status: DISCONTINUED | OUTPATIENT
Start: 2024-01-01 | End: 2024-01-01 | Stop reason: HOSPADM

## 2024-01-01 RX ORDER — VANCOMYCIN HYDROCHLORIDE 1 G/200ML
1000 INJECTION, SOLUTION INTRAVENOUS EVERY 24 HOURS
Status: DISCONTINUED | OUTPATIENT
Start: 2024-01-01 | End: 2024-01-01

## 2024-01-01 RX ORDER — HEPARIN SODIUM,PORCINE 10 UNIT/ML
5-10 VIAL (ML) INTRAVENOUS EVERY 24 HOURS
Status: DISCONTINUED | OUTPATIENT
Start: 2024-01-01 | End: 2024-01-01 | Stop reason: HOSPADM

## 2024-01-01 RX ORDER — FLUMAZENIL 0.1 MG/ML
0.2 INJECTION, SOLUTION INTRAVENOUS
Status: CANCELLED | OUTPATIENT
Start: 2024-01-01

## 2024-01-01 RX ORDER — SODIUM CHLORIDE 9 MG/ML
INJECTION, SOLUTION INTRAVENOUS CONTINUOUS
Status: DISCONTINUED | OUTPATIENT
Start: 2024-01-01 | End: 2024-01-01

## 2024-01-01 RX ORDER — ONDANSETRON 2 MG/ML
4 INJECTION INTRAMUSCULAR; INTRAVENOUS EVERY 6 HOURS PRN
Status: DISCONTINUED | OUTPATIENT
Start: 2024-01-01 | End: 2024-01-01 | Stop reason: HOSPADM

## 2024-01-01 RX ORDER — GADOBUTROL 604.72 MG/ML
8 INJECTION INTRAVENOUS ONCE
Status: COMPLETED | OUTPATIENT
Start: 2024-01-01 | End: 2024-01-01

## 2024-01-01 RX ORDER — OLANZAPINE 2.5 MG/1
2.5 TABLET, FILM COATED ORAL SEE ADMIN INSTRUCTIONS
COMMUNITY

## 2024-01-01 RX ORDER — LORAZEPAM 0.5 MG/1
0.5 TABLET ORAL EVERY 4 HOURS PRN
Status: DISCONTINUED | OUTPATIENT
Start: 2024-01-01 | End: 2024-01-01

## 2024-01-01 RX ORDER — PIPERACILLIN SODIUM, TAZOBACTAM SODIUM 3; .375 G/15ML; G/15ML
3.38 INJECTION, POWDER, LYOPHILIZED, FOR SOLUTION INTRAVENOUS EVERY 6 HOURS
Status: DISCONTINUED | OUTPATIENT
Start: 2024-01-01 | End: 2024-01-01

## 2024-01-01 RX ORDER — FENTANYL CITRATE 50 UG/ML
25-50 INJECTION, SOLUTION INTRAMUSCULAR; INTRAVENOUS EVERY 5 MIN PRN
Status: CANCELLED | OUTPATIENT
Start: 2024-01-01

## 2024-01-01 RX ORDER — ONDANSETRON 4 MG/1
4 TABLET, ORALLY DISINTEGRATING ORAL EVERY 6 HOURS PRN
Status: DISCONTINUED | OUTPATIENT
Start: 2024-01-01 | End: 2024-01-01 | Stop reason: HOSPADM

## 2024-01-01 RX ORDER — ACETAMINOPHEN 325 MG/1
975 TABLET ORAL EVERY 8 HOURS PRN
Status: DISCONTINUED | OUTPATIENT
Start: 2024-01-01 | End: 2024-01-01

## 2024-01-01 RX ORDER — LIDOCAINE 40 MG/G
CREAM TOPICAL
Status: DISCONTINUED | OUTPATIENT
Start: 2024-01-01 | End: 2024-01-01

## 2024-01-01 RX ADMIN — SODIUM CHLORIDE 1000 ML: 9 INJECTION, SOLUTION INTRAVENOUS at 20:17

## 2024-01-01 RX ADMIN — ENOXAPARIN SODIUM 40 MG: 40 INJECTION SUBCUTANEOUS at 22:30

## 2024-01-01 RX ADMIN — PIPERACILLIN AND TAZOBACTAM 3.38 G: 3; .375 INJECTION, POWDER, FOR SOLUTION INTRAVENOUS at 22:35

## 2024-01-01 RX ADMIN — SODIUM BICARBONATE: 84 INJECTION, SOLUTION INTRAVENOUS at 00:05

## 2024-01-01 RX ADMIN — HYDROMORPHONE HYDROCHLORIDE 0.4 MG: 0.2 INJECTION, SOLUTION INTRAMUSCULAR; INTRAVENOUS; SUBCUTANEOUS at 05:53

## 2024-01-01 RX ADMIN — PIPERACILLIN AND TAZOBACTAM 3.38 G: 3; .375 INJECTION, POWDER, FOR SOLUTION INTRAVENOUS at 22:04

## 2024-01-01 RX ADMIN — SENNOSIDES AND DOCUSATE SODIUM 1 TABLET: 8.6; 5 TABLET ORAL at 19:56

## 2024-01-01 RX ADMIN — ONDANSETRON 4 MG: 2 INJECTION INTRAMUSCULAR; INTRAVENOUS at 15:07

## 2024-01-01 RX ADMIN — PIPERACILLIN AND TAZOBACTAM 3.38 G: 3; .375 INJECTION, POWDER, FOR SOLUTION INTRAVENOUS at 16:43

## 2024-01-01 RX ADMIN — PIPERACILLIN AND TAZOBACTAM 3.38 G: 3; .375 INJECTION, POWDER, FOR SOLUTION INTRAVENOUS at 16:01

## 2024-01-01 RX ADMIN — FLUOXETINE 20 MG: 20 CAPSULE ORAL at 07:47

## 2024-01-01 RX ADMIN — OXYCODONE HYDROCHLORIDE 10 MG: 10 TABLET ORAL at 15:06

## 2024-01-01 RX ADMIN — FLUOXETINE 20 MG: 20 CAPSULE ORAL at 08:27

## 2024-01-01 RX ADMIN — HEPARIN, PORCINE (PF) 10 UNIT/ML INTRAVENOUS SYRINGE 5 ML: at 01:08

## 2024-01-01 RX ADMIN — SODIUM CHLORIDE: 900 INJECTION INTRAVENOUS at 22:20

## 2024-01-01 RX ADMIN — FLUOXETINE 20 MG: 20 CAPSULE ORAL at 08:52

## 2024-01-01 RX ADMIN — ACETAMINOPHEN 650 MG: 325 TABLET, FILM COATED ORAL at 05:44

## 2024-01-01 RX ADMIN — PIPERACILLIN AND TAZOBACTAM 3.38 G: 3; .375 INJECTION, POWDER, FOR SOLUTION INTRAVENOUS at 22:26

## 2024-01-01 RX ADMIN — ENOXAPARIN SODIUM 40 MG: 40 INJECTION SUBCUTANEOUS at 00:11

## 2024-01-01 RX ADMIN — ENOXAPARIN SODIUM 40 MG: 40 INJECTION SUBCUTANEOUS at 22:35

## 2024-01-01 RX ADMIN — FLUOXETINE HYDROCHLORIDE 10 MG: 10 CAPSULE ORAL at 18:57

## 2024-01-01 RX ADMIN — PIPERACILLIN AND TAZOBACTAM 3.38 G: 3; .375 INJECTION, POWDER, FOR SOLUTION INTRAVENOUS at 10:50

## 2024-01-01 RX ADMIN — FLUOXETINE 20 MG: 20 CAPSULE ORAL at 08:57

## 2024-01-01 RX ADMIN — SODIUM CHLORIDE: 900 INJECTION INTRAVENOUS at 22:30

## 2024-01-01 RX ADMIN — SENNOSIDES AND DOCUSATE SODIUM 1 TABLET: 8.6; 5 TABLET ORAL at 09:32

## 2024-01-01 RX ADMIN — FLUOXETINE HYDROCHLORIDE 10 MG: 10 CAPSULE ORAL at 17:22

## 2024-01-01 RX ADMIN — ACETAMINOPHEN 975 MG: 325 TABLET, FILM COATED ORAL at 23:39

## 2024-01-01 RX ADMIN — SENNOSIDES AND DOCUSATE SODIUM 1 TABLET: 8.6; 5 TABLET ORAL at 20:18

## 2024-01-01 RX ADMIN — OXYCODONE HYDROCHLORIDE 5 MG: 5 TABLET ORAL at 13:48

## 2024-01-01 RX ADMIN — OXYCODONE HYDROCHLORIDE 5 MG: 5 TABLET ORAL at 16:04

## 2024-01-01 RX ADMIN — SODIUM BICARBONATE: 84 INJECTION, SOLUTION INTRAVENOUS at 20:18

## 2024-01-01 RX ADMIN — SODIUM CHLORIDE: 900 INJECTION INTRAVENOUS at 08:59

## 2024-01-01 RX ADMIN — VANCOMYCIN HYDROCHLORIDE 1000 MG: 1 INJECTION, SOLUTION INTRAVENOUS at 16:49

## 2024-01-01 RX ADMIN — HEPARIN, PORCINE (PF) 10 UNIT/ML INTRAVENOUS SYRINGE 5 ML: at 05:38

## 2024-01-01 RX ADMIN — SODIUM CHLORIDE: 900 INJECTION INTRAVENOUS at 14:15

## 2024-01-01 RX ADMIN — SODIUM BICARBONATE: 84 INJECTION, SOLUTION INTRAVENOUS at 11:59

## 2024-01-01 RX ADMIN — FLUOXETINE HYDROCHLORIDE 10 MG: 10 CAPSULE ORAL at 16:19

## 2024-01-01 RX ADMIN — PIPERACILLIN AND TAZOBACTAM 3.38 G: 3; .375 INJECTION, POWDER, FOR SOLUTION INTRAVENOUS at 22:22

## 2024-01-01 RX ADMIN — PIPERACILLIN AND TAZOBACTAM 3.38 G: 3; .375 INJECTION, POWDER, FOR SOLUTION INTRAVENOUS at 04:45

## 2024-01-01 RX ADMIN — PIPERACILLIN AND TAZOBACTAM 3.38 G: 3; .375 INJECTION, POWDER, FOR SOLUTION INTRAVENOUS at 16:14

## 2024-01-01 RX ADMIN — CALCITONIN SALMON 292 UNITS: 200 INJECTION, SOLUTION INTRAMUSCULAR; SUBCUTANEOUS at 23:07

## 2024-01-01 RX ADMIN — LIDOCAINE HYDROCHLORIDE 10 ML: 10 INJECTION, SOLUTION EPIDURAL; INFILTRATION; INTRACAUDAL; PERINEURAL at 14:06

## 2024-01-01 RX ADMIN — SODIUM CHLORIDE 1000 ML: 9 INJECTION, SOLUTION INTRAVENOUS at 00:29

## 2024-01-01 RX ADMIN — OXYCODONE HYDROCHLORIDE 5 MG: 5 TABLET ORAL at 08:07

## 2024-01-01 RX ADMIN — CALCITONIN SALMON 292 UNITS: 200 INJECTION, SOLUTION INTRAMUSCULAR; SUBCUTANEOUS at 13:17

## 2024-01-01 RX ADMIN — PIPERACILLIN AND TAZOBACTAM 3.38 G: 3; .375 INJECTION, POWDER, FOR SOLUTION INTRAVENOUS at 22:30

## 2024-01-01 RX ADMIN — PIPERACILLIN AND TAZOBACTAM 3.38 G: 3; .375 INJECTION, POWDER, FOR SOLUTION INTRAVENOUS at 10:09

## 2024-01-01 RX ADMIN — PIPERACILLIN AND TAZOBACTAM 3.38 G: 3; .375 INJECTION, POWDER, FOR SOLUTION INTRAVENOUS at 17:22

## 2024-01-01 RX ADMIN — HEPARIN, PORCINE (PF) 10 UNIT/ML INTRAVENOUS SYRINGE 5 ML: at 18:58

## 2024-01-01 RX ADMIN — ONDANSETRON 4 MG: 4 TABLET, ORALLY DISINTEGRATING ORAL at 08:47

## 2024-01-01 RX ADMIN — IOPAMIDOL 88 ML: 755 INJECTION, SOLUTION INTRAVENOUS at 10:31

## 2024-01-01 RX ADMIN — SENNOSIDES AND DOCUSATE SODIUM 1 TABLET: 8.6; 5 TABLET ORAL at 08:28

## 2024-01-01 RX ADMIN — PIPERACILLIN AND TAZOBACTAM 3.38 G: 3; .375 INJECTION, POWDER, FOR SOLUTION INTRAVENOUS at 04:16

## 2024-01-01 RX ADMIN — FLUOXETINE 20 MG: 20 CAPSULE ORAL at 09:32

## 2024-01-01 RX ADMIN — HEPARIN, PORCINE (PF) 10 UNIT/ML INTRAVENOUS SYRINGE 5 ML: at 00:17

## 2024-01-01 RX ADMIN — PIPERACILLIN AND TAZOBACTAM 3.38 G: 3; .375 INJECTION, POWDER, FOR SOLUTION INTRAVENOUS at 21:22

## 2024-01-01 RX ADMIN — ENOXAPARIN SODIUM 40 MG: 40 INJECTION SUBCUTANEOUS at 23:40

## 2024-01-01 RX ADMIN — SODIUM CHLORIDE: 900 INJECTION INTRAVENOUS at 22:53

## 2024-01-01 RX ADMIN — SENNOSIDES AND DOCUSATE SODIUM 1 TABLET: 8.6; 5 TABLET ORAL at 09:19

## 2024-01-01 RX ADMIN — PIPERACILLIN AND TAZOBACTAM 3.38 G: 3; .375 INJECTION, POWDER, FOR SOLUTION INTRAVENOUS at 04:20

## 2024-01-01 RX ADMIN — FLUOXETINE 20 MG: 20 CAPSULE ORAL at 08:06

## 2024-01-01 RX ADMIN — PIPERACILLIN AND TAZOBACTAM 3.38 G: 3; .375 INJECTION, POWDER, FOR SOLUTION INTRAVENOUS at 09:10

## 2024-01-01 RX ADMIN — GADOBUTROL 8 ML: 604.72 INJECTION INTRAVENOUS at 17:52

## 2024-01-01 RX ADMIN — FLUOXETINE 20 MG: 20 CAPSULE ORAL at 09:18

## 2024-01-01 RX ADMIN — ACETAMINOPHEN 650 MG: 325 TABLET, FILM COATED ORAL at 14:55

## 2024-01-01 RX ADMIN — OXYCODONE HYDROCHLORIDE 5 MG: 5 TABLET ORAL at 17:39

## 2024-01-01 RX ADMIN — SODIUM CHLORIDE: 900 INJECTION INTRAVENOUS at 11:44

## 2024-01-01 RX ADMIN — PIPERACILLIN AND TAZOBACTAM 3.38 G: 3; .375 INJECTION, POWDER, FOR SOLUTION INTRAVENOUS at 11:06

## 2024-01-01 RX ADMIN — FLUOXETINE HYDROCHLORIDE 10 MG: 10 CAPSULE ORAL at 17:12

## 2024-01-01 RX ADMIN — INSULIN ASPART 1 UNITS: 100 INJECTION, SOLUTION INTRAVENOUS; SUBCUTANEOUS at 16:57

## 2024-01-01 RX ADMIN — SENNOSIDES AND DOCUSATE SODIUM 1 TABLET: 8.6; 5 TABLET ORAL at 00:32

## 2024-01-01 RX ADMIN — ENOXAPARIN SODIUM 40 MG: 40 INJECTION SUBCUTANEOUS at 22:44

## 2024-01-01 RX ADMIN — PIPERACILLIN AND TAZOBACTAM 3.38 G: 3; .375 INJECTION, POWDER, FOR SOLUTION INTRAVENOUS at 16:38

## 2024-01-01 RX ADMIN — SENNOSIDES AND DOCUSATE SODIUM 1 TABLET: 8.6; 5 TABLET ORAL at 07:48

## 2024-01-01 RX ADMIN — HEPARIN, PORCINE (PF) 10 UNIT/ML INTRAVENOUS SYRINGE 5 ML: at 16:18

## 2024-01-01 RX ADMIN — PIPERACILLIN AND TAZOBACTAM 3.38 G: 3; .375 INJECTION, POWDER, FOR SOLUTION INTRAVENOUS at 17:11

## 2024-01-01 RX ADMIN — FLUOXETINE HYDROCHLORIDE 10 MG: 10 CAPSULE ORAL at 17:52

## 2024-01-01 RX ADMIN — FLUOXETINE 20 MG: 20 CAPSULE ORAL at 08:36

## 2024-01-01 RX ADMIN — SENNOSIDES AND DOCUSATE SODIUM 1 TABLET: 8.6; 5 TABLET ORAL at 08:57

## 2024-01-01 RX ADMIN — SODIUM CHLORIDE: 900 INJECTION INTRAVENOUS at 00:57

## 2024-01-01 RX ADMIN — SENNOSIDES AND DOCUSATE SODIUM 1 TABLET: 8.6; 5 TABLET ORAL at 19:57

## 2024-01-01 RX ADMIN — SODIUM CHLORIDE 500 ML: 9 INJECTION, SOLUTION INTRAVENOUS at 09:33

## 2024-01-01 RX ADMIN — SENNOSIDES AND DOCUSATE SODIUM 1 TABLET: 8.6; 5 TABLET ORAL at 08:52

## 2024-01-01 RX ADMIN — SODIUM CHLORIDE 500 ML: 9 INJECTION, SOLUTION INTRAVENOUS at 11:46

## 2024-01-01 RX ADMIN — PIPERACILLIN AND TAZOBACTAM 3.38 G: 3; .375 INJECTION, POWDER, FOR SOLUTION INTRAVENOUS at 15:55

## 2024-01-01 RX ADMIN — PIPERACILLIN AND TAZOBACTAM 3.38 G: 3; .375 INJECTION, POWDER, FOR SOLUTION INTRAVENOUS at 09:18

## 2024-01-01 RX ADMIN — SENNOSIDES AND DOCUSATE SODIUM 1 TABLET: 8.6; 5 TABLET ORAL at 08:36

## 2024-01-01 RX ADMIN — SENNOSIDES AND DOCUSATE SODIUM 1 TABLET: 8.6; 5 TABLET ORAL at 08:09

## 2024-01-01 RX ADMIN — FLUOXETINE HYDROCHLORIDE 10 MG: 10 CAPSULE ORAL at 20:18

## 2024-01-01 RX ADMIN — PIPERACILLIN AND TAZOBACTAM 3.38 G: 3; .375 INJECTION, POWDER, FOR SOLUTION INTRAVENOUS at 04:01

## 2024-01-01 RX ADMIN — PIPERACILLIN AND TAZOBACTAM 3.38 G: 3; .375 INJECTION, POWDER, FOR SOLUTION INTRAVENOUS at 09:38

## 2024-01-01 RX ADMIN — OXYCODONE HYDROCHLORIDE 5 MG: 5 TABLET ORAL at 11:57

## 2024-01-01 RX ADMIN — HYDROMORPHONE HYDROCHLORIDE 0.4 MG: 0.2 INJECTION, SOLUTION INTRAMUSCULAR; INTRAVENOUS; SUBCUTANEOUS at 02:23

## 2024-01-01 RX ADMIN — SODIUM CHLORIDE 65 ML: 9 INJECTION, SOLUTION INTRAVENOUS at 10:31

## 2024-01-01 RX ADMIN — OXYCODONE HYDROCHLORIDE 5 MG: 5 TABLET ORAL at 21:58

## 2024-01-01 RX ADMIN — VANCOMYCIN HYDROCHLORIDE 1000 MG: 1 INJECTION, SOLUTION INTRAVENOUS at 17:37

## 2024-01-01 RX ADMIN — HYDROMORPHONE HYDROCHLORIDE 0.2 MG: 0.2 INJECTION, SOLUTION INTRAMUSCULAR; INTRAVENOUS; SUBCUTANEOUS at 00:19

## 2024-01-01 RX ADMIN — FLUOXETINE HYDROCHLORIDE 10 MG: 10 CAPSULE ORAL at 16:47

## 2024-01-01 RX ADMIN — PIPERACILLIN AND TAZOBACTAM 3.38 G: 3; .375 INJECTION, POWDER, FOR SOLUTION INTRAVENOUS at 10:03

## 2024-01-01 RX ADMIN — CALCITONIN SALMON 292 UNITS: 200 INJECTION, SOLUTION INTRAMUSCULAR; SUBCUTANEOUS at 00:50

## 2024-01-01 RX ADMIN — SODIUM BICARBONATE: 84 INJECTION, SOLUTION INTRAVENOUS at 09:22

## 2024-01-01 RX ADMIN — ONDANSETRON 4 MG: 2 INJECTION INTRAMUSCULAR; INTRAVENOUS at 16:19

## 2024-01-01 RX ADMIN — OXYCODONE HYDROCHLORIDE 5 MG: 5 TABLET ORAL at 07:47

## 2024-01-01 RX ADMIN — OXYCODONE HYDROCHLORIDE 5 MG: 5 TABLET ORAL at 18:30

## 2024-01-01 RX ADMIN — ONDANSETRON 4 MG: 2 INJECTION INTRAMUSCULAR; INTRAVENOUS at 07:44

## 2024-01-01 RX ADMIN — PIPERACILLIN AND TAZOBACTAM 3.38 G: 3; .375 INJECTION, POWDER, FOR SOLUTION INTRAVENOUS at 21:52

## 2024-01-01 RX ADMIN — FLUOXETINE HYDROCHLORIDE 10 MG: 10 CAPSULE ORAL at 16:04

## 2024-01-01 RX ADMIN — ENOXAPARIN SODIUM 30 MG: 30 INJECTION SUBCUTANEOUS at 23:30

## 2024-01-01 RX ADMIN — PIPERACILLIN AND TAZOBACTAM 4.5 G: 4; .5 INJECTION, POWDER, FOR SOLUTION INTRAVENOUS at 21:34

## 2024-01-01 RX ADMIN — DIAZEPAM 2.5 MG: 10 INJECTION, SOLUTION INTRAMUSCULAR; INTRAVENOUS at 01:04

## 2024-01-01 RX ADMIN — OXYCODONE HYDROCHLORIDE 5 MG: 5 TABLET ORAL at 01:52

## 2024-01-01 RX ADMIN — ACETAMINOPHEN 650 MG: 325 TABLET, FILM COATED ORAL at 08:31

## 2024-01-01 RX ADMIN — ZOLEDRONIC ACID 4 MG: 4 INJECTION, SOLUTION, CONCENTRATE INTRAVENOUS at 20:23

## 2024-01-01 RX ADMIN — PIPERACILLIN AND TAZOBACTAM 3.38 G: 3; .375 INJECTION, POWDER, FOR SOLUTION INTRAVENOUS at 04:09

## 2024-01-01 RX ADMIN — INSULIN ASPART 1 UNITS: 100 INJECTION, SOLUTION INTRAVENOUS; SUBCUTANEOUS at 10:13

## 2024-01-01 RX ADMIN — INSULIN ASPART 2 UNITS: 100 INJECTION, SOLUTION INTRAVENOUS; SUBCUTANEOUS at 14:58

## 2024-01-01 RX ADMIN — PIPERACILLIN AND TAZOBACTAM 3.38 G: 3; .375 INJECTION, POWDER, FOR SOLUTION INTRAVENOUS at 04:24

## 2024-01-01 RX ADMIN — OXYCODONE HYDROCHLORIDE 5 MG: 5 TABLET ORAL at 17:44

## 2024-01-01 RX ADMIN — PIPERACILLIN AND TAZOBACTAM 3.38 G: 3; .375 INJECTION, POWDER, FOR SOLUTION INTRAVENOUS at 04:37

## 2024-01-01 RX ADMIN — OXYCODONE HYDROCHLORIDE 5 MG: 5 TABLET ORAL at 10:26

## 2024-01-01 RX ADMIN — SODIUM CHLORIDE: 900 INJECTION INTRAVENOUS at 09:12

## 2024-01-01 RX ADMIN — PIPERACILLIN AND TAZOBACTAM 3.38 G: 3; .375 INJECTION, POWDER, FOR SOLUTION INTRAVENOUS at 11:10

## 2024-01-01 ASSESSMENT — ACTIVITIES OF DAILY LIVING (ADL)
ADLS_ACUITY_SCORE: 51
ADLS_ACUITY_SCORE: 37
ADLS_ACUITY_SCORE: 53
ADLS_ACUITY_SCORE: 53
ADLS_ACUITY_SCORE: 37
ADLS_ACUITY_SCORE: 53
ADLS_ACUITY_SCORE: 53
ADLS_ACUITY_SCORE: 37
ADLS_ACUITY_SCORE: 57
ADLS_ACUITY_SCORE: 51
ADLS_ACUITY_SCORE: 51
ADLS_ACUITY_SCORE: 63
ADLS_ACUITY_SCORE: 53
ADLS_ACUITY_SCORE: 53
ADLS_ACUITY_SCORE: 51
ADLS_ACUITY_SCORE: 53
ADLS_ACUITY_SCORE: 37
ADLS_ACUITY_SCORE: 63
ADLS_ACUITY_SCORE: 51
ADLS_ACUITY_SCORE: 53
ADLS_ACUITY_SCORE: 51
ADLS_ACUITY_SCORE: 47
ADLS_ACUITY_SCORE: 53
ADLS_ACUITY_SCORE: 51
ADLS_ACUITY_SCORE: 61
ADLS_ACUITY_SCORE: 53
ADLS_ACUITY_SCORE: 53
ADLS_ACUITY_SCORE: 35
ADLS_ACUITY_SCORE: 51
ADLS_ACUITY_SCORE: 53
ADLS_ACUITY_SCORE: 37
ADLS_ACUITY_SCORE: 55
ADLS_ACUITY_SCORE: 37
ADLS_ACUITY_SCORE: 59
ADLS_ACUITY_SCORE: 53
ADLS_ACUITY_SCORE: 53
ADLS_ACUITY_SCORE: 37
ADLS_ACUITY_SCORE: 51
ADLS_ACUITY_SCORE: 35
ADLS_ACUITY_SCORE: 53
ADLS_ACUITY_SCORE: 59
ADLS_ACUITY_SCORE: 51
ADLS_ACUITY_SCORE: 53
ADLS_ACUITY_SCORE: 47
ADLS_ACUITY_SCORE: 51
ADLS_ACUITY_SCORE: 33
ADLS_ACUITY_SCORE: 53
ADLS_ACUITY_SCORE: 47
ADLS_ACUITY_SCORE: 53
ADLS_ACUITY_SCORE: 37
ADLS_ACUITY_SCORE: 63
ADLS_ACUITY_SCORE: 59
ADLS_ACUITY_SCORE: 51
ADLS_ACUITY_SCORE: 47
ADLS_ACUITY_SCORE: 57
ADLS_ACUITY_SCORE: 57
ADLS_ACUITY_SCORE: 37
ADLS_ACUITY_SCORE: 55
ADLS_ACUITY_SCORE: 37
ADLS_ACUITY_SCORE: 63
ADLS_ACUITY_SCORE: 37
ADLS_ACUITY_SCORE: 53
ADLS_ACUITY_SCORE: 55
ADLS_ACUITY_SCORE: 37
ADLS_ACUITY_SCORE: 39
ADLS_ACUITY_SCORE: 51
ADLS_ACUITY_SCORE: 57
ADLS_ACUITY_SCORE: 59
ADLS_ACUITY_SCORE: 39
ADLS_ACUITY_SCORE: 59
ADLS_ACUITY_SCORE: 53
ADLS_ACUITY_SCORE: 51
ADLS_ACUITY_SCORE: 53
ADLS_ACUITY_SCORE: 55
ADLS_ACUITY_SCORE: 47
ADLS_ACUITY_SCORE: 51
ADLS_ACUITY_SCORE: 47
ADLS_ACUITY_SCORE: 47
ADLS_ACUITY_SCORE: 57
ADLS_ACUITY_SCORE: 55
ADLS_ACUITY_SCORE: 57
ADLS_ACUITY_SCORE: 51
ADLS_ACUITY_SCORE: 47
ADLS_ACUITY_SCORE: 51
ADLS_ACUITY_SCORE: 37
ADLS_ACUITY_SCORE: 37
ADLS_ACUITY_SCORE: 51
ADLS_ACUITY_SCORE: 57
ADLS_ACUITY_SCORE: 47
ADLS_ACUITY_SCORE: 63
ADLS_ACUITY_SCORE: 51
ADLS_ACUITY_SCORE: 37
ADLS_ACUITY_SCORE: 51
ADLS_ACUITY_SCORE: 53
ADLS_ACUITY_SCORE: 51
ADLS_ACUITY_SCORE: 55
ADLS_ACUITY_SCORE: 51
ADLS_ACUITY_SCORE: 55
ADLS_ACUITY_SCORE: 47
ADLS_ACUITY_SCORE: 57
ADLS_ACUITY_SCORE: 55
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
## 2024-01-10 PROBLEM — G93.41 METABOLIC ENCEPHALOPATHY: Status: ACTIVE | Noted: 2024-01-01

## 2024-01-10 PROBLEM — E83.52 HYPERCALCEMIA: Status: ACTIVE | Noted: 2024-01-01

## 2024-01-10 PROBLEM — D72.829 LEUKOCYTOSIS, UNSPECIFIED TYPE: Status: ACTIVE | Noted: 2024-01-01

## 2024-01-10 NOTE — LETTER
Hospice referral.     TINO Sam, [facility]  Inpatient Care Coordination  Emergency Room /Float  [phone]

## 2024-01-10 NOTE — ED TRIAGE NOTES
Patient had an appointment today with [facility] and upon returning home has been having difficulty ambulating and her  states she seems slightly confused. Patient states she feels weak.      Triage Assessment (Adult)       Row Name 01/10/24 4432          Triage Assessment    Airway WDL WDL        Respiratory WDL    Respiratory WDL WDL        Skin Circulation/Temperature WDL    Skin Circulation/Temperature WDL WDL        Cardiac WDL    Cardiac WDL WDL        Peripheral/Neurovascular WDL    Peripheral Neurovascular WDL WDL        Cognitive/Neuro/Behavioral WDL    Cognitive/Neuro/Behavioral WDL WDL

## 2024-01-10 NOTE — Clinical Note
1/10/2024         RE: Lani Walker  [address] [address]        Dear Colleague,    Thank you for referring your patient, Lani Walker, to the [facility] DIABETES EDUCATION. Please see a copy of my visit note below.      Not feeling well today - cancer progressing   Taking diabetes medications? yes:     Diabetes Medication(s)       Insulin       insulin glargine (LANTUS PEN) 100 UNIT/ML pen Inject 12-18 Units Subcutaneous at bedtime            BG results: ***     Lab Results   Component Value Date    A1C 10.0 10/23/2023    A1C 6.0 06/02/2022    A1C 5.9 11/18/2021         Nutrition:  Breakfast - ***  Lunch - ***   Dinner - ***   Snacks - ***    Beverages: {BEVERAGES per day:297222}    Wt Readings from Last 5 Encounters:   10/23/23 80.3 kg (177 lb)   06/02/22 76.6 kg (168 lb 12.8 oz)   05/23/22 78.7 kg (173 lb 9.6 oz)   05/20/22 80.7 kg (178 lb)   04/27/22 80.7 kg (178 lb)           INTERVENTION:   ***        Diabetes Education     Lani declined a visit today and denies any Diabetes questions/concerns.  She will update as needed if anything arises.     Jennifer King RD, LD, [facility]  Diabetes Education  No charge

## 2024-01-10 NOTE — LETTER
Hospice referral.     TINO Sam, [facility]  Inpatient Care Coordination  Emergency Room /Float  [phone]

## 2024-01-10 NOTE — PROGRESS NOTES
Diabetes Education     Lani declined a visit today and denies any Diabetes questions/concerns.  She will update as needed if anything arises.     Jennifer King RD, LD, [facility]  Diabetes Education  No charge

## 2024-01-11 NOTE — ED PROVIDER NOTES
History     Chief Complaint:  Generalized Weakness       The history is provided by the patient.      Lani Walker is a 63 year old female presenting to the ED for generalized weakness. The patient is currently in cancer treatments for pancreatic cancer, but her daughter explained that one of her chemo treatments was discontinued two weeks ago. She last received chemotherapy on the week of 12/25. The patient was feeling fatigued yesterday afternoon (1/9/2024) and was very disoriented today. She has also been having trouble ambulating. She denied any pain, bleeding, edema, rashes, skin changes, localized numbness, or weakness. Note, the patient is scheduled to have a calcium transfusion tomorrow.     Independent Historian:   The patient's daughter is present and provides additional history in regards to her treatment through [facility] and the progression of her confusion in the last 2 days.    Review of External Notes:   [facility] notes reviewed from January 2, 2024 detailing the patient's treatment for metastatic adenocarcinoma of the pancreas.      Medications:    Albuterol   Lipitor   Prozac   Hydrochlorothiazide   Lantus     Past Medical History:    Hypertension   Hypercholesterolemia   Depression   Tobacco use   Pancreatic cancer with metastases to liver    Past Surgical History:    Breast biopsy   Colonoscopy x2   Chest port placement      Physical Exam   Patient Vitals for the past 24 hrs:   BP Temp Pulse Resp SpO2 Weight   01/10/24 2230 107/70 -- 87 -- 96 % --   01/10/24 2212 -- -- 87 -- 96 % --   01/10/24 2211 -- -- 84 -- 96 % --   01/10/24 2210 -- -- 86 -- 95 % --   01/10/24 2209 -- -- 79 -- 91 % --   01/10/24 2208 -- -- 85 -- 95 % --   01/10/24 2207 -- -- 84 -- 94 % --   01/10/24 2206 -- -- 85 -- 95 % --   01/10/24 2205 -- -- 85 -- 95 % --   01/10/24 2201 -- -- 84 -- 95 % --   01/10/24 2200 110/74 -- 84 -- 95 % --   01/10/24 2130 126/87 -- 84 -- 99 % --   01/10/24 2121 -- -- -- --  -- 73 kg (161 lb)   01/10/24 2050 -- -- -- -- 99 % --   01/10/24 2049 -- -- -- -- 99 % --   01/10/24 2048 -- -- -- -- 99 % --   01/10/24 2047 -- -- -- -- 99 % --   01/10/24 2046 -- -- -- -- 99 % --   01/10/24 2045 -- -- -- -- 100 % --   01/10/24 2044 -- -- -- -- 98 % --   01/10/24 2043 -- -- -- -- 98 % --   01/10/24 2032 -- -- -- -- 98 % --   01/10/24 2031 -- -- -- -- 99 % --   01/10/24 2030 126/84 -- 91 -- 99 % --   01/10/24 2029 119/80 -- 91 -- 99 % --   01/10/24 2027 -- -- -- -- 98 % --   01/10/24 2026 -- -- -- -- 98 % --   01/10/24 2025 -- -- -- -- 97 % --   01/10/24 2024 -- -- -- -- 97 % --   01/10/24 2023 -- -- -- -- 97 % --   01/10/24 2022 -- -- -- -- 96 % --   01/10/24 2021 -- -- -- -- 97 % --   01/10/24 2020 -- -- -- -- 97 % --   01/10/24 1941 -- -- -- -- 97 % --   01/10/24 1940 -- -- -- -- 98 % --   01/10/24 1930 115/83 -- -- -- -- --   01/10/24 1622 110/74 98.1  F (36.7  C) 109 18 99 % --        Physical Exam  Constitutional:       Comments: Cachectic.  Oriented to person.   HENT:      Head: Atraumatic.      Right Ear: External ear normal.      Left Ear: External ear normal.      Mouth/Throat:      Mouth: Mucous membranes are dry.   Eyes:      General: No scleral icterus.     Conjunctiva/sclera: Conjunctivae normal.   Cardiovascular:      Rate and Rhythm: Normal rate and regular rhythm.      Heart sounds: Normal heart sounds.   Pulmonary:      Effort: No respiratory distress.      Breath sounds: Normal breath sounds.   Abdominal:      General: Abdomen is flat. There is no distension.      Tenderness: There is no abdominal tenderness.   Musculoskeletal:      Cervical back: Neck supple.      Right lower leg: No edema.      Left lower leg: No edema.   Skin:     General: Skin is warm.      Capillary Refill: Capillary refill takes less than 2 seconds.      Findings: No rash.   Neurological:      General: No focal deficit present.      Mental Status: She is oriented to person, place, and time.   Psychiatric:       Comments: Appears disoriented.  Cooperative.           Emergency Department Course   ECG  ECG results from 01/10/24   EKG 12-lead, tracing only     Value    Systolic Blood Pressure     Diastolic Blood Pressure     Ventricular Rate 114    Atrial Rate 114    OH Interval 148    QRS Duration 80        QTc 410    P Axis 49    R AXIS 24    T Axis 52    Interpretation ECG      Sinus tachycardia  Otherwise normal ECG  No previous ECGs available        Imaging:  US Abdomen Limited (RUQ)   Final Result   IMPRESSION:      1.  Numerous metastases within both hepatic lobes, better visualized on the recent CT.      2.  No intrahepatic biliary ductal dilation. Common bile duct is not well visualized.      3.  Gallbladder contains sludge. There is mild gallbladder wall thickening, which is nonspecific in the setting of liver disease and ascites. No specific signs of acute cholecystitis.      4.  Small amount of ascites in the right upper quadrant.      CT Head w/o Contrast   Final Result   IMPRESSION:   1.  Mild age-related changes without acute intracranial abnormality.             Laboratory:  Labs Ordered and Resulted from Time of ED Arrival to Time of ED Departure   INR - Abnormal       Result Value    INR 1.30 (*)    LIPASE - Abnormal    Lipase 455 (*)    BASIC METABOLIC PANEL - Abnormal    Sodium 130 (*)     Potassium 4.5      Chloride 91 (*)     Carbon Dioxide (CO2) 23      Anion Gap 16 (*)     Urea Nitrogen 12.7      Creatinine 0.54      GFR Estimate >90      Calcium 13.9 (*)     Glucose 91     HEPATIC FUNCTION PANEL - Abnormal    Protein Total 6.0 (*)     Albumin 2.9 (*)     Bilirubin Total 4.4 (*)     Alkaline Phosphatase 296 (*)      (*)     ALT 46      Bilirubin Direct 3.51 (*)    ROUTINE UA WITH MICROSCOPIC REFLEX TO CULTURE - Abnormal    Color Urine Dark Yellow (*)     Appearance Urine Cloudy (*)     Glucose Urine Negative      Bilirubin Urine Small (*)     Ketones Urine Trace (*)     Specific Gravity  Urine 1.025      Blood Urine Negative      pH Urine 5.0      Protein Albumin Urine 30 (*)     Urobilinogen Urine 8.0 (*)     Nitrite Urine Negative      Leukocyte Esterase Urine Negative      Bacteria Urine Many (*)     Mucus Urine Present (*)     Calcium Oxalate Crystals Urine Moderate (*)     RBC Urine 2      WBC Urine 1      Squamous Epithelials Urine 4 (*)     Hyaline Casts Urine 53 (*)     Granular Casts Urine 3 (*)    IONIZED CALCIUM - Abnormal    Calcium Ionized Whole Blood 7.8 (*)    CBC WITH PLATELETS AND DIFFERENTIAL - Abnormal    WBC Count 40.4 (*)     RBC Count 3.12 (*)     Hemoglobin 9.2 (*)     Hematocrit 28.4 (*)     MCV 91      MCH 29.5      MCHC 32.4      RDW 22.0 (*)     Platelet Count 416      % Neutrophils 83      % Lymphocytes 4      % Monocytes 9      % Eosinophils 0      % Basophils 0      % Immature Granulocytes 4      NRBCs per 100 WBC 0      Absolute Neutrophils 33.7 (*)     Absolute Lymphocytes 1.5      Absolute Monocytes 3.5 (*)     Absolute Eosinophils 0.0      Absolute Basophils 0.1      Absolute Immature Granulocytes 1.5 (*)     Absolute NRBCs 0.0     LACTIC ACID WHOLE BLOOD - Abnormal    Lactic Acid 3.8 (*)    PARTIAL THROMBOPLASTIN TIME - Normal    aPTT 29     AMMONIA - Normal    Ammonia 42     LACTIC ACID WHOLE BLOOD   GLUCOSE MONITOR NURSING POCT   GLUCOSE MONITOR NURSING POCT   GLUCOSE MONITOR NURSING POCT   GLUCOSE MONITOR NURSING POCT   GLUCOSE MONITOR NURSING POCT   TYPE AND SCREEN, ADULT    ABO/RH(D) O POS      Antibody Screen Negative      SPECIMEN EXPIRATION DATE [id number]     URINE CULTURE   BLOOD CULTURE   BLOOD CULTURE   ABO/RH TYPE AND SCREEN        Emergency Department Course & Assessments:             Interventions:  Medications   sodium chloride 0.9 % infusion ( Intravenous $New Bag 1/10/24 9322)   FLUoxetine (PROzac) capsule 20 mg (has no administration in time range)   lidocaine 1 % 0.1-1 mL (has no administration in time range)   lidocaine (LMX4) cream (has no  administration in time range)   sodium chloride (PF) 0.9% PF flush 3 mL (3 mLs Intracatheter $Given 1/11/24 0029)   sodium chloride (PF) 0.9% PF flush 3 mL (has no administration in time range)   senna-docusate (SENOKOT-S/PERICOLACE) 8.6-50 MG per tablet 1 tablet (has no administration in time range)     Or   senna-docusate (SENOKOT-S/PERICOLACE) 8.6-50 MG per tablet 2 tablet (has no administration in time range)   enoxaparin ANTICOAGULANT (LOVENOX) injection 40 mg (40 mg Subcutaneous $Given 1/10/24 2340)   sodium chloride 0.9% BOLUS 1,000 mL (1,000 mLs Intravenous $New Bag 1/11/24 0029)   oxyCODONE (ROXICODONE) tablet 5 mg (has no administration in time range)   oxyCODONE (ROXICODONE) tablet 10 mg (has no administration in time range)   senna-docusate (SENOKOT-S/PERICOLACE) 8.6-50 MG per tablet 1 tablet (has no administration in time range)     Or   senna-docusate (SENOKOT-S/PERICOLACE) 8.6-50 MG per tablet 2 tablet (has no administration in time range)   ondansetron (ZOFRAN ODT) ODT tab 4 mg (has no administration in time range)     Or   ondansetron (ZOFRAN) injection 4 mg (has no administration in time range)   prochlorperazine (COMPAZINE) injection 10 mg (has no administration in time range)     Or   prochlorperazine (COMPAZINE) tablet 10 mg (has no administration in time range)     Or   prochlorperazine (COMPAZINE) suppository 25 mg (has no administration in time range)   piperacillin-tazobactam (ZOSYN) 3.375 g vial to attach to  mL bag (has no administration in time range)   acetaminophen (TYLENOL) tablet 975 mg (975 mg Oral $Given 1/10/24 2339)   glucose gel 15-30 g (has no administration in time range)     Or   dextrose 50 % injection 25-50 mL (has no administration in time range)     Or   glucagon injection 1 mg (has no administration in time range)   insulin aspart (NovoLOG) injection (RAPID ACTING) ( Subcutaneous Not Given 1/11/24 0028)   LORazepam (ATIVAN) tablet 0.5 mg (has no administration in  time range)   albuterol (PROVENTIL) neb solution 2.5 mg (has no administration in time range)   sodium chloride 0.9% BOLUS 1,000 mL (1,000 mLs Intravenous $New Bag 1/10/24 2017)   piperacillin-tazobactam (ZOSYN) 4.5 g vial to attach to  mL bag (4.5 g Intravenous $New Bag 1/10/24 2134)   calcitonin (MIACALCIN) injection 292 Units (292 Units Subcutaneous $Given 1/10/24 2307)        Assessments/Consultations/Discussion of Management or Tests:  ED Course as of 01/11/24 0030   Wed Rony 10, 2024   1826 I obtained history and examined the patient as noted above.     2033 I reassessed the patient.        Social Determinants of Health affecting care:   The patient lives with her family.  She unfortunately has metastatic cancer with poor response to treatment so far.    Disposition:  The patient was admitted to the hospital under the care of Dr. Sampson.     Impression & Plan      Medical Decision Making:  This patient is a 63-year-old who presents to the emergency department with confusion.  This may be related in part to dehydration.  However, with her hypercalcemia this is the most likely contributing factor.  Ammonia level is normal.  CT scan without any apparent metastatic lesions.    The patient was given IV fluids and calcitonin.  Her mental status has improved somewhat.  She is resting comfortably.    Patient is hemodynamically stable.  She does not appear to be septic as we do not have a source of infection.  She does have a leukocytosis which may be related to the underlying malignancy.  We will cover her with antibiotics for the time being.    Bilirubin is elevated today.  Ultrasound does not appear to demonstrate evidence of obstruction or cholecystitis or cholangitis.      Diagnosis:    ICD-10-CM    1. Metabolic encephalopathy  G93.41       2. Hypercalcemia  E83.52       3. Leukocytosis, unspecified type  D72.829                I, Sherry Nicole, am serving as a scribe at 6:46 PM on 1/10/2024 to document  services personally performed by Vasu Herron MD based on my observations and the provider's statements to me.    1/10/2024   Vasu Herron MD McRoberts, Sean Edward, MD  01/11/24 0030

## 2024-01-11 NOTE — H&P
[facility] History and Physical    Lani Walker MRN# [id number]   Age: 63 year old YOB: 1960     Date of Admission:  1/10/2024    Home clinic: [facility]  Primary care provider: Francine Lombardi          Assessment and Plan:   Assessment:   Lani Walker is a 63 year old woman who is now contemplating third-line therapy for metastatic pancreatic cancer under the management of Dr. Melara. She is brought to attention today due to confusion and weakness that developed over the last 24 hours or so.    On presentation to the emergency department, VS: , /74, RR 18 with oxygen saturation 99% breathing room air.  Temperature 98.1  F.  Examination reveals an arousable and mostly appropriate woman.  Apparently she was much better when I met her after she had already received a liter of fluid.  Otherwise she has pain in the left upper quadrant but is remarkably not profoundly cachectic.  Labs: Creatinine 0.54, sodium 130, chloride 91, calcium 13.9 (ionized calcium 7.8) and anion gap 16.  /ALT 46, alkaline phosphatase 296.  Ammonia 42, total bilirubin 4.4.  Glucose 91, lactic acid 3.8, lipase 455.  WBC 40.4 with more than 80% neutrophils, Hgb 9.2 (stable from recent values) platelets 416.  INR 1.3.  Urinalysis negative for evidence of infection but compatible with relative dehydration.  Blood and urine cultures were obtained.  Imaging: CT head without contrast negative for acute change.  Ultrasound of the abdomen shows multiple liver metastases and small volume ascites but otherwise no specific finding.  Incidentally, an echocardiogram completed today shows hyperdynamic LV function with grade 1 diastolic dysfunction.    DX:  1.  Encephalopathy, probably multifactorial, but the most obvious abnormality is the very high calcium.    2.  Metastatic pancreatic adenocarcinoma. Initially did well on FOLFOX, but subsequently recurred. Most recently on  "Gemcitabine and Abraxane, cycle #, day #1 as of 12/26/23.  3.  Pancreatic insufficiency.  Patient has documented in her chart that she has taken Creon in the past but I do not know what strength and cannot find doses.  Is also noted that the patient is typically on insulin for her diabetic management.  It sounds as though she has not been eating in the last couple of days.  4.  Leukocytosis.  The white cell count has been rising based on review of labs from [facility].  Unclear why this is going on but possible infection needs to be considered.  I note that the patient does complain of some increased shortness of breath.  Chart review indicates that CT chest abdomen and pelvis obtained on 1/3/2024 does not show a specific cause for her shortness of breath but does not document \"Marked interval progression of disease.\"  5.  Goals of care.  Unfortunately, I believe the family is not apprised of how imminently grave the patient's condition is.  They are currently planning on the patient starting a new experimental treatment.  Unfortunately, they have not had a formal discussion about CODE STATUS.      Plan:   Admit to inpatinet.   Generous fluid support with NS.  Monitor for fluid overload and diurese as needed.  Consider dose of zoledronic acid.  Blood and urine cultures obtained.   Empiric Zosyn.  I have asked for oncology to be involved.    Hold Lantus.  At this time I will treat patient with insulin sliding scale every 4 hours as I do not see that she is eating or drinking very much.  This will need to be adjusted.  Pancreatic enzyme dosing needs to be sorted out.               Chief Complaint:     Weakness, confusion much worse in the last day.     History is obtained from the patient, electronic health record, and emergency department physician     Lani Walker reportedly was doing fairly well over the weekend.  She was most recently seen at [facility] on 1/8/2024 at which time Dr. Melara talk " about the possibility of experimental therapy for the patient.    I do not see any clear documentation that the patient previously has been given zoledronic acid.  Nor do I see any documentation that she received any filgrastim to explain that extremely high white cell count.    The patient was diagnosed with metastatic pancreatic cancer in June 2022 after presenting with abdominal pain.  She did well initially with FOLFIRINOX but unfortunately relapsed.  She then was treated with gemcitabine and Abraxane just this past 2 months or so but has progressed on that as well.      Denies significant weight loss.  No documented fevers, sweats or chills.  Although she complains of shortness of breath, she has not had a real cough.  No nausea or vomiting but she reports that she is now constipated somewhat.  She has recently started oxycodone for pain in the left upper quadrant.  Her daughter states that the patient has not eaten or drunk anything at all in the last 24 hours.  Until today, patient was able to ambulate independently but now is felt to be unusually weak.    It is not clear to me that the patient or her family have had thorough discussion about goals of care with oncology.  This needs to happen.        Past Medical History:     Past Medical History:   Diagnosis Date    HTN (hypertension)              Past Surgical History:      Past Surgical History:   Procedure Laterality Date    BREAST BIOPSY, RT/LT  2016    left benign     COLONOSCOPY  04/2017    every 5 years     COLONOSCOPY N/A 4/21/2022    Procedure: COLONOSCOPY;  Surgeon: Sharif Gill MD;  Location: [facility]    IR CHEST PORT PLACEMENT > 5 YRS OF AGE  6/15/2022             Social History:     Social History     Tobacco Use    Smoking status: Every Day    Smokeless tobacco: Never    Tobacco comments:     10 cigs per day   Substance Use Topics    Alcohol use: Yes     Comment: once weekly 4-5 drinks              Family History:     Family History    Problem Relation Age of Onset    Alzheimer Disease Mother     Heart Surgery Father         stent    Lung Cancer Father 88        non-smokerr    Diabetes Maternal Grandfather     Breast Cancer Maternal Aunt     Ovarian Cancer Maternal Aunt     Alzheimer Disease Maternal Aunt     Alzheimer Disease Maternal Aunt     Stomach Cancer Maternal Aunt     Asthma No family hx of     Thyroid Disease No family hx of     Colon Cancer No family hx of      Family history reviewed          Immunizations:     Immunization History   Administered Date(s) Administered    COVID-19 MONOVALENT 12+ (Pfizer) 03/19/2021, 04/10/2021, 12/05/2021    Influenza Vaccine 18-64 (Flublok) 09/13/2019, 08/30/2020, 10/26/2021, 10/23/2023    Influenza Vaccine >6 months,quad, PF 03/01/2017, 01/19/2018    Pneumococcal 23 valent 02/07/2017    Td (Adult), Adsorbed 04/19/1999    Tdap (Adult) Unspecified Formulation 06/23/2016             Allergies:   No Known Allergies          Medications:   Albuterol inhaler 2 puffs every 6 hours as needed  Atorvastatin 40 mg daily  Cetirizine 10 mg daily  Fluoxetine 20 mg daily  Hydrochlorothiazide 25 mg daily  Insulin glargine 12 to 18 units subcutaneously at bedtime  Reportedly on pancreatic enzymes but doses not documented         Review of Systems:     A comprehensive review of systems was performed and found to be negative except as described in this note           Physical Exam:   Vitals were reviewed  Temp: 98.1  F (36.7  C)   BP: 107/70 Pulse: 87   Resp: 18 SpO2: 96 %      Constitutional: Awake, somnolent, cooperative, no apparent distress.    Eyes: Lids and lashes normal, extra ocular muscles intact, sclera clear, conjunctiva normal.  ENT: Normocephalic, without obvious abnormality, atraumatic.  Neck: Supple, symmetrical, trachea midline, no adenopathy, thyroid symmetric, not enlarged and no tenderness, skin normal.  Hematologic / Lymphatic: No cervical lymphadenopathy and no supraclavicular  lymphadenopathy.  Lungs: No increased work of breathing, good air exchange, clear to auscultation bilaterally, no crackles or wheezing.  Cardiovascular: Regular rate and rhythm, normal S1 and S2, no S3 or S4, and no murmur noted.  Abdomen: Bowel sounds are very quiet.  Tenderness in the left upper quadrant noted with a firm mass in that area as well.  Musculoskeletal: No redness, warmth, or swelling of the joints. Tone is normal.  No remarkable pitting edema noted.  Neurologic: Awake, arousable.  Appears appropriate to the situation.  Skin: No rashes, erythema, pallor, petechia or purpura.          Data:     Results for orders placed or performed during the hospital encounter of 01/10/24 (from the past 24 hour(s))   EKG 12-lead, tracing only   Result Value Ref Range    Systolic Blood Pressure  mmHg    Diastolic Blood Pressure  mmHg    Ventricular Rate 114 BPM    Atrial Rate 114 BPM    NC Interval 148 ms    QRS Duration 80 ms     ms    QTc 410 ms    P Axis 49 degrees    R AXIS 24 degrees    T Axis 52 degrees    Interpretation ECG       Sinus tachycardia  Otherwise normal ECG  No previous ECGs available     INR   Result Value Ref Range    INR 1.30 (H) 0.85 - 1.15   Partial thromboplastin time   Result Value Ref Range    aPTT 29 22 - 38 Seconds   Lipase   Result Value Ref Range    Lipase 455 (H) 13 - 60 U/L   Basic metabolic panel   Result Value Ref Range    Sodium 130 (L) 135 - 145 mmol/L    Potassium 4.5 3.4 - 5.3 mmol/L    Chloride 91 (L) 98 - 107 mmol/L    Carbon Dioxide (CO2) 23 22 - 29 mmol/L    Anion Gap 16 (H) 7 - 15 mmol/L    Urea Nitrogen 12.7 8.0 - 23.0 mg/dL    Creatinine 0.54 0.51 - 0.95 mg/dL    GFR Estimate >90 >60 mL/min/1.73m2    Calcium 13.9 (H) 8.8 - 10.2 mg/dL    Glucose 91 70 - 99 mg/dL   Hepatic function panel   Result Value Ref Range    Protein Total 6.0 (L) 6.4 - 8.3 g/dL    Albumin 2.9 (L) 3.5 - 5.2 g/dL    Bilirubin Total 4.4 (H) <=1.2 mg/dL    Alkaline Phosphatase 296 (H) 40 - 150 U/L      (H) 0 - 45 U/L    ALT 46 0 - 50 U/L    Bilirubin Direct 3.51 (H) 0.00 - 0.30 mg/dL   Ammonia   Result Value Ref Range    Ammonia 42 11 - 51 umol/L   CT Head w/o Contrast    Narrative    EXAM: CT HEAD W/O CONTRAST  LOCATION: [facility]  DATE: 1/10/2024    INDICATION: Altered mental status  COMPARISON: None.  TECHNIQUE: Routine CT Head without IV contrast. Multiplanar reformats. Dose reduction techniques were used.    FINDINGS:  INTRACRANIAL CONTENTS: No intracranial hemorrhage, extraaxial collection, or mass effect.  No CT evidence of acute infarct. Normal parenchymal attenuation for age. Normal ventricles and sulci for age.     VISUALIZED ORBITS/SINUSES/MASTOIDS: No intraorbital abnormality. No significant paranasal sinus mucosal disease. No middle ear or mastoid effusion.    BONES/SOFT TISSUES: No acute abnormality.      Impression    IMPRESSION:  1.  Mild age-related changes without acute intracranial abnormality.   CBC with platelets differential    Narrative    The following orders were created for panel order CBC with platelets differential.  Procedure                               Abnormality         Status                     ---------                               -----------         ------                     CBC with platelets and d...[[id number]]  Abnormal            Final result                 Please view results for these tests on the individual orders.   ABO/Rh type and screen    Narrative    The following orders were created for panel order ABO/Rh type and screen.  Procedure                               Abnormality         Status                     ---------                               -----------         ------                     Adult Type and Screen[[id number]]                            Final result                 Please view results for these tests on the individual orders.   Ionized Calcium   Result Value Ref Range    Calcium Ionized Whole Blood 7.8 (HH)  4.4 - 5.2 mg/dL   CBC with platelets and differential   Result Value Ref Range    WBC Count 40.4 (H) 4.0 - 11.0 10e3/uL    RBC Count 3.12 (L) 3.80 - 5.20 10e6/uL    Hemoglobin 9.2 (L) 11.7 - 15.7 g/dL    Hematocrit 28.4 (L) 35.0 - 47.0 %    MCV 91 78 - 100 fL    MCH 29.5 26.5 - 33.0 pg    MCHC 32.4 31.5 - 36.5 g/dL    RDW 22.0 (H) 10.0 - 15.0 %    Platelet Count 416 150 - 450 10e3/uL    % Neutrophils 83 %    % Lymphocytes 4 %    % Monocytes 9 %    % Eosinophils 0 %    % Basophils 0 %    % Immature Granulocytes 4 %    NRBCs per 100 WBC 0 <1 /100    Absolute Neutrophils 33.7 (H) 1.6 - 8.3 10e3/uL    Absolute Lymphocytes 1.5 0.8 - 5.3 10e3/uL    Absolute Monocytes 3.5 (H) 0.0 - 1.3 10e3/uL    Absolute Eosinophils 0.0 0.0 - 0.7 10e3/uL    Absolute Basophils 0.1 0.0 - 0.2 10e3/uL    Absolute Immature Granulocytes 1.5 (H) <=0.4 10e3/uL    Absolute NRBCs 0.0 10e3/uL   Adult Type and Screen   Result Value Ref Range    ABO/RH(D) O POS     Antibody Screen Negative Negative    SPECIMEN EXPIRATION DATE [id number]    Lactic acid whole blood   Result Value Ref Range    Lactic Acid 3.8 (H) 0.7 - 2.0 mmol/L   US Abdomen Limited (RUQ)    Narrative    EXAM: US ABDOMEN LIMITED  LOCATION: [facility]  DATE: 1/10/2024    INDICATION: Elevated bilirubin.  COMPARISON: CT abdomen pelvis 01/03/2024.  TECHNIQUE: Limited abdominal ultrasound.    FINDINGS:    GALLBLADDER: Gallbladder contains sludge. Gallbladder wall is mildly thickened, measuring up to 4 mm. No discrete gallstones. No pericholecystic fluid. Sonographic Hinds's sign is negative.    BILE DUCTS: No intrahepatic biliary ductal dilation. Common bile duct is not well visualized.    LIVER: Liver is enlarged, measuring up to 20.5 cm. There are multiple heterogeneous metastases throughout the liver, better visualized on the recent CT. Main portal vein is patent with hepatopedal flow.    RIGHT KIDNEY: No hydronephrosis.    PANCREAS: Visualized portion of the  head is normal. Body and tail is obscured by overlying bowel gas.    Small amount of ascites within the right upper quadrant.      Impression    IMPRESSION:    1.  Numerous metastases within both hepatic lobes, better visualized on the recent CT.    2.  No intrahepatic biliary ductal dilation. Common bile duct is not well visualized.    3.  Gallbladder contains sludge. There is mild gallbladder wall thickening, which is nonspecific in the setting of liver disease and ascites. No specific signs of acute cholecystitis.    4.  Small amount of ascites in the right upper quadrant.   UA with Microscopic reflex to Culture    Specimen: Urine, Midstream   Result Value Ref Range    Color Urine Dark Yellow (A) Colorless, Straw, Light Yellow, Yellow    Appearance Urine Cloudy (A) Clear    Glucose Urine Negative Negative mg/dL    Bilirubin Urine Small (A) Negative    Ketones Urine Trace (A) Negative mg/dL    Specific Gravity Urine 1.025 1.003 - 1.035    Blood Urine Negative Negative    pH Urine 5.0 5.0 - 7.0    Protein Albumin Urine 30 (A) Negative mg/dL    Urobilinogen Urine 8.0 (A) Normal, 2.0 mg/dL    Nitrite Urine Negative Negative    Leukocyte Esterase Urine Negative Negative    Bacteria Urine Many (A) None Seen /HPF    Mucus Urine Present (A) None Seen /LPF    Calcium Oxalate Crystals Urine Moderate (A) None Seen /HPF    RBC Urine 2 <=2 /HPF    WBC Urine 1 <=5 /HPF    Squamous Epithelials Urine 4 (H) <=1 /HPF    Hyaline Casts Urine 53 (H) <=2 /LPF    Granular Casts Urine 3 (H) None Seen /LPF    Narrative    Urine Culture not indicated      All cardiac studies reviewed by me.   All imaging studies reviewed by me.      Attestation:  I have reviewed today's vital signs, notes, medications, labs and imaging.     Bert Sampson MD

## 2024-01-11 NOTE — CONSULTS
"Palliative Care Consultation Note  [facility]      Patient: Lani Walker  Date of Admission:  1/10/2024    Requesting Clinician / Team: hospitalist  Reason for consult: Goals of care  Decisional support  Patient and family support       Recommendations & Counseling     GOALS OF CARE:   Restorative without limits   \"At this time my mom is not ready to die and would want all treatment and interventions offered to her and would want resuscitation\"    ADVANCE CARE PLANNING:  No health care directive on file. Per  informed consent policy, next of kin should be involved if patient becomes unable.  There is no POLST form on file, defer to patient and/or next of kin for decisions   Code status: Full Code    MEDICAL MANAGEMENT:   #Pain   [place] Board of Pharmacy Data Base Reviewed: Yes:   reviewed - controlled substances reflected in medication list.  Opioids: oxycodone (Roxicodone) IR   Acetaminophen (Tylenol), PRN- dose reduced due to liver functions  Repositioning as able     #Delirium  #Agitation  Avoid benzodiazepines, antihistamines, anticholinergics if able.  Lights on and blinds open during the day.  Reorient frequently.  Lights & TV off during the night.  Promote normal circadian rhythm.  Limit sensory deprivation - utilize hearing aids, glasses, etc.  Frequently assess basic needs such as temperature, elimination, thirst/hunger, pain  Consider bedside attendant (if able) for additional safety  Redirection as able     #Dyspnea  Complementary therapies  Repositioning for comfort  Monitor respiratory status closely    #General Weakness/Debility   Appreciate the help of staff for ADLs per plan of care  Appreciate the input of therapies for discharge recommendations  Family is interested in equipment for at home, hospital bed, OBT, commode  Family is looking into hiring help in the home    PSYCHOSOCIAL/SPIRITUAL SUPPORT:  Family   [place] community: Yarsani would appreciate  " for emotional support    Palliative Care will continue to follow. Thank you for the consult and allowing us to aid in the care of Lani Walker.    These recommendations have been discussed with medical team.    Felicia Haywood NP  Securely message with [employer] (more info)  Text page via [facility] Paging/Directory       Assessment      Lani Walker is a 63 year old female with a past medical history of  metastatic pancreatic cancer-follows with Dr. Melara at [facility] who presented on 1/10/2024 with increase confusion and weakness.      Today, the patient was seen for:  Goals of care  Decisional and emotional support    Palliative Care Summary:   Met with patient daughter Phyllis, еленаece Della and friend Sherri.   I introduced our role as an extra layer of support and how we help patients and families dealing with serious, potentially life-limiting illnesses. I explained the composition of the palliative care team.  Palliative care helps patients and families navigate their care while focusing on the whole person; providing emotional, social and spiritual support  Palliative care often assists with symptom management, information sharing about what to expect from the illness, available treatment options and what effect those options may have on the disease course, and provide effective communication and caring support. Discussed base goals of care, she wanted to get more chemo, liver biopsy and wants all treatments and interventions today.    Prognosis, Goals, & Planning:    Functional Status just prior to this current hospitalization:  Decreased activity, decreased appetite    Prognosis, Goals, and/or Advance Care Planning:  We discussed general treatment options (full/restorative, selective/conservatives, and comfort only/hospice). We then discussed how these specifically apply to her current condition.  Based on this discussion, patient and daughter  has decided to continue with everything. And talk about  goals in the coming days    Code Status was addressed today:   Yes, We discussed potential risks and rationale of attempting cardiac resuscitation, intubation, and mechanical ventilation.  We also discussed probability of survival as well as quality of life implications.  Based on this discussion, patient or surrogate response/decision: full code          Patient has decision-making capacity today for complex decisions:Questionable            Coping, Meaning, & Spirituality:   Mood, coping, and/or meaning in the context of serious illness were addressed today: Yes    Social:   Living situation:lives with significant other/spouse    Medications:  I have reviewed this patient's medication profile and medications from this hospitalization. Notable medications:none     ROS:  Comprehensive ROS is reviewed and is negative except as here & per HPI:     Physical Exam   Vital Signs with Ranges  Temp:  [97.2  F (36.2  C)-98.1  F (36.7  C)] 97.2  F (36.2  C)  Pulse:  [] 93  Resp:  [18] 18  BP: (107-126)/(70-87) 116/73  SpO2:  [91 %-100 %] 96 %  161 lbs 0 oz    PHYSICAL EXAM:  Constitutional: alert, no distress, and sleeping between cares   Cardiovascular: positive findings: tachycardia  Respiratory: positive findings: nonproductive cough  Psychiatric: confused  Abdomen: tender with palpation  Pain: grimacing and calling out in pain with certain movements    Data reviewed:  Results for orders placed or performed during the hospital encounter of 01/10/24 (from the past 24 hour(s))   EKG 12-lead, tracing only   Result Value Ref Range    Systolic Blood Pressure  mmHg    Diastolic Blood Pressure  mmHg    Ventricular Rate 114 BPM    Atrial Rate 114 BPM    RI Interval 148 ms    QRS Duration 80 ms     ms    QTc 410 ms    P Axis 49 degrees    R AXIS 24 degrees    T Axis 52 degrees    Interpretation ECG       Sinus tachycardia  Otherwise normal ECG  No previous ECGs available  Unconfirmed report - interpretation of this ECG  is computer generated - see medical record for final interpretation  Confirmed by - EMERGENCY ROOM, PHYSICIAN ([id number]),  DENNIS FUENTES ([id number]) on 1/11/2024 7:47:01 AM     INR   Result Value Ref Range    INR 1.30 (H) 0.85 - 1.15   Partial thromboplastin time   Result Value Ref Range    aPTT 29 22 - 38 Seconds   Lipase   Result Value Ref Range    Lipase 455 (H) 13 - 60 U/L   Basic metabolic panel   Result Value Ref Range    Sodium 130 (L) 135 - 145 mmol/L    Potassium 4.5 3.4 - 5.3 mmol/L    Chloride 91 (L) 98 - 107 mmol/L    Carbon Dioxide (CO2) 23 22 - 29 mmol/L    Anion Gap 16 (H) 7 - 15 mmol/L    Urea Nitrogen 12.7 8.0 - 23.0 mg/dL    Creatinine 0.54 0.51 - 0.95 mg/dL    GFR Estimate >90 >60 mL/min/1.73m2    Calcium 13.9 (H) 8.8 - 10.2 mg/dL    Glucose 91 70 - 99 mg/dL   Hepatic function panel   Result Value Ref Range    Protein Total 6.0 (L) 6.4 - 8.3 g/dL    Albumin 2.9 (L) 3.5 - 5.2 g/dL    Bilirubin Total 4.4 (H) <=1.2 mg/dL    Alkaline Phosphatase 296 (H) 40 - 150 U/L     (H) 0 - 45 U/L    ALT 46 0 - 50 U/L    Bilirubin Direct 3.51 (H) 0.00 - 0.30 mg/dL   Ammonia   Result Value Ref Range    Ammonia 42 11 - 51 umol/L   CT Head w/o Contrast    Narrative    EXAM: CT HEAD W/O CONTRAST  LOCATION: [facility]  DATE: 1/10/2024    INDICATION: Altered mental status  COMPARISON: None.  TECHNIQUE: Routine CT Head without IV contrast. Multiplanar reformats. Dose reduction techniques were used.    FINDINGS:  INTRACRANIAL CONTENTS: No intracranial hemorrhage, extraaxial collection, or mass effect.  No CT evidence of acute infarct. Normal parenchymal attenuation for age. Normal ventricles and sulci for age.     VISUALIZED ORBITS/SINUSES/MASTOIDS: No intraorbital abnormality. No significant paranasal sinus mucosal disease. No middle ear or mastoid effusion.    BONES/SOFT TISSUES: No acute abnormality.      Impression    IMPRESSION:  1.  Mild age-related changes without acute intracranial  abnormality.   CBC with platelets differential    Narrative    The following orders were created for panel order CBC with platelets differential.  Procedure                               Abnormality         Status                     ---------                               -----------         ------                     CBC with platelets and d...[[id number]]  Abnormal            Final result                 Please view results for these tests on the individual orders.   ABO/Rh type and screen    Narrative    The following orders were created for panel order ABO/Rh type and screen.  Procedure                               Abnormality         Status                     ---------                               -----------         ------                     Adult Type and Screen[[id number]]                            Final result                 Please view results for these tests on the individual orders.   Ionized Calcium   Result Value Ref Range    Calcium Ionized Whole Blood 7.8 (HH) 4.4 - 5.2 mg/dL   CBC with platelets and differential   Result Value Ref Range    WBC Count 40.4 (H) 4.0 - 11.0 10e3/uL    RBC Count 3.12 (L) 3.80 - 5.20 10e6/uL    Hemoglobin 9.2 (L) 11.7 - 15.7 g/dL    Hematocrit 28.4 (L) 35.0 - 47.0 %    MCV 91 78 - 100 fL    MCH 29.5 26.5 - 33.0 pg    MCHC 32.4 31.5 - 36.5 g/dL    RDW 22.0 (H) 10.0 - 15.0 %    Platelet Count 416 150 - 450 10e3/uL    % Neutrophils 83 %    % Lymphocytes 4 %    % Monocytes 9 %    % Eosinophils 0 %    % Basophils 0 %    % Immature Granulocytes 4 %    NRBCs per 100 WBC 0 <1 /100    Absolute Neutrophils 33.7 (H) 1.6 - 8.3 10e3/uL    Absolute Lymphocytes 1.5 0.8 - 5.3 10e3/uL    Absolute Monocytes 3.5 (H) 0.0 - 1.3 10e3/uL    Absolute Eosinophils 0.0 0.0 - 0.7 10e3/uL    Absolute Basophils 0.1 0.0 - 0.2 10e3/uL    Absolute Immature Granulocytes 1.5 (H) <=0.4 10e3/uL    Absolute NRBCs 0.0 10e3/uL   Adult Type and Screen   Result Value Ref Range    ABO/RH(D) O POS     Antibody  Screen Negative Negative    SPECIMEN EXPIRATION DATE [id number]    Lactic acid whole blood   Result Value Ref Range    Lactic Acid 3.8 (H) 0.7 - 2.0 mmol/L   Blood Culture Arm, Right    Specimen: Arm, Right; Blood   Result Value Ref Range    Culture No growth after 12 hours    Blood Culture Hand, Left    Specimen: Hand, Left; Blood   Result Value Ref Range    Culture No growth after 12 hours    US Abdomen Limited (RUQ)    Narrative    EXAM: US ABDOMEN LIMITED  LOCATION: [facility]  DATE: 1/10/2024    INDICATION: Elevated bilirubin.  COMPARISON: CT abdomen pelvis 01/03/2024.  TECHNIQUE: Limited abdominal ultrasound.    FINDINGS:    GALLBLADDER: Gallbladder contains sludge. Gallbladder wall is mildly thickened, measuring up to 4 mm. No discrete gallstones. No pericholecystic fluid. Sonographic Hinds's sign is negative.    BILE DUCTS: No intrahepatic biliary ductal dilation. Common bile duct is not well visualized.    LIVER: Liver is enlarged, measuring up to 20.5 cm. There are multiple heterogeneous metastases throughout the liver, better visualized on the recent CT. Main portal vein is patent with hepatopedal flow.    RIGHT KIDNEY: No hydronephrosis.    PANCREAS: Visualized portion of the head is normal. Body and tail is obscured by overlying bowel gas.    Small amount of ascites within the right upper quadrant.      Impression    IMPRESSION:    1.  Numerous metastases within both hepatic lobes, better visualized on the recent CT.    2.  No intrahepatic biliary ductal dilation. Common bile duct is not well visualized.    3.  Gallbladder contains sludge. There is mild gallbladder wall thickening, which is nonspecific in the setting of liver disease and ascites. No specific signs of acute cholecystitis.    4.  Small amount of ascites in the right upper quadrant.   UA with Microscopic reflex to Culture    Specimen: Urine, Midstream   Result Value Ref Range    Color Urine Dark Yellow (A)  Colorless, Straw, Light Yellow, Yellow    Appearance Urine Cloudy (A) Clear    Glucose Urine Negative Negative mg/dL    Bilirubin Urine Small (A) Negative    Ketones Urine Trace (A) Negative mg/dL    Specific Gravity Urine 1.025 1.003 - 1.035    Blood Urine Negative Negative    pH Urine 5.0 5.0 - 7.0    Protein Albumin Urine 30 (A) Negative mg/dL    Urobilinogen Urine 8.0 (A) Normal, 2.0 mg/dL    Nitrite Urine Negative Negative    Leukocyte Esterase Urine Negative Negative    Bacteria Urine Many (A) None Seen /HPF    Mucus Urine Present (A) None Seen /LPF    Calcium Oxalate Crystals Urine Moderate (A) None Seen /HPF    RBC Urine 2 <=2 /HPF    WBC Urine 1 <=5 /HPF    Squamous Epithelials Urine 4 (H) <=1 /HPF    Hyaline Casts Urine 53 (H) <=2 /LPF    Granular Casts Urine 3 (H) None Seen /LPF    Narrative    Urine Culture not indicated   Glucose by meter   Result Value Ref Range    GLUCOSE BY METER POCT 72 70 - 99 mg/dL   Lactic acid whole blood   Result Value Ref Range    Lactic Acid 2.8 (H) 0.7 - 2.0 mmol/L   Glucose by meter   Result Value Ref Range    GLUCOSE BY METER POCT 79 70 - 99 mg/dL   Comprehensive metabolic panel   Result Value Ref Range    Sodium 131 (L) 135 - 145 mmol/L    Potassium 4.8 3.4 - 5.3 mmol/L    Carbon Dioxide (CO2) 17 (L) 22 - 29 mmol/L    Anion Gap 14 7 - 15 mmol/L    Urea Nitrogen 14.2 8.0 - 23.0 mg/dL    Creatinine 0.68 0.51 - 0.95 mg/dL    GFR Estimate >90 >60 mL/min/1.73m2    Calcium 12.0 (H) 8.8 - 10.2 mg/dL    Chloride 100 98 - 107 mmol/L    Glucose 91 70 - 99 mg/dL    Alkaline Phosphatase 240 (H) 40 - 150 U/L     (H) 0 - 45 U/L    ALT 37 0 - 50 U/L    Protein Total 5.1 (L) 6.4 - 8.3 g/dL    Albumin 2.4 (L) 3.5 - 5.2 g/dL    Bilirubin Total 4.8 (H) <=1.2 mg/dL   CBC with platelets   Result Value Ref Range    WBC Count 46.8 (H) 4.0 - 11.0 10e3/uL    RBC Count 2.75 (L) 3.80 - 5.20 10e6/uL    Hemoglobin 8.1 (L) 11.7 - 15.7 g/dL    Hematocrit 25.0 (L) 35.0 - 47.0 %    MCV 91 78 - 100  fL    MCH 29.5 26.5 - 33.0 pg    MCHC 32.4 31.5 - 36.5 g/dL    RDW 22.4 (H) 10.0 - 15.0 %    Platelet Count 356 150 - 450 10e3/uL   Lactic acid whole blood   Result Value Ref Range    Lactic Acid 3.3 (H) 0.7 - 2.0 mmol/L   Glucose by meter   Result Value Ref Range    GLUCOSE BY METER POCT 97 70 - 99 mg/dL   Glucose by meter   Result Value Ref Range    GLUCOSE BY METER POCT 100 (H) 70 - 99 mg/dL   Lactic acid whole blood   Result Value Ref Range    Lactic Acid 2.9 (H) 0.7 - 2.0 mmol/L       Medical Decision Making       85 MINUTES SPENT BY ME on the date of service doing chart review, history, exam, documentation & further activities per the note.

## 2024-01-11 NOTE — PHARMACY-ADMISSION MEDICATION HISTORY
Pharmacist Admission Medication History    Admission medication history is complete. The information provided in this note is only as accurate as the sources available at the time of the update.    Information Source(s): Patient, Family member, Patient's pharmacy, and Clinic records via in-person    Pertinent Information: pt unable to contribute to med history. Family present but not well versed in pt's pta meds.     Changes made to PTA medication list:  Added: oxycodone, golanzapinem prochlorperazinem lorazepam  Deleted: Vit C, atorvastatin, Zyrtec, hydrochlorothiazide  Changed: None    Medication Affordability:       Allergies reviewed with patient and updates made in EHR: unable to assess    Medication History Completed By: Tania Morgan RPH 1/11/2024 9:08 AM    PTA Med List   Medication Sig Last Dose    albuterol (PROAIR HFA/PROVENTIL HFA/VENTOLIN HFA) 108 (90 Base) MCG/ACT inhaler INHALE 2 PUFFS BY MOUTH EVERY 6 HOURS AS NEEDED FOR SHORTNESS OF BREATH OR DIFFICULT BREATHING OR WHEEZING Unknown    FLUoxetine (PROZAC) 10 MG capsule Take 20 mg by mouth every morning 1/11/2024 at in ER    FLUoxetine (PROZAC) 10 MG capsule Take 10 mg by mouth every evening In addition to 20 mg in the morning. Unknown    insulin glargine (LANTUS PEN) 100 UNIT/ML pen Inject 12-18 Units Subcutaneous at bedtime Unknown    LORazepam (ATIVAN) 0.5 MG tablet Take 0.5 mg by mouth every 6 hours as needed for anxiety Unknown    OLANZapine (ZYPREXA) 2.5 MG tablet Take 2.5 mg by mouth See Admin Instructions TAKE 1 TABLET BY MOUTH ONCE DAILY AT BEDTIME ON DAYS 1-4 OF CHEMO Unknown    oxyCODONE (ROXICODONE) 5 MG tablet Take 5 mg by mouth every 6 hours as needed for severe pain Unknown    prochlorperazine (COMPAZINE) 10 MG tablet Take 10 mg by mouth every 6 hours as needed for nausea or vomiting Unknown

## 2024-01-11 NOTE — ED NOTES
[facility]  ED Nurse Handoff Report    ED Chief complaint: Generalized Weakness  . ED Diagnosis:   Final diagnoses:   Metabolic encephalopathy   Hypercalcemia   Leukocytosis, unspecified type       Allergies: No Known Allergies    Code Status: Full Code    Activity level - Baseline/Home:  in bed.  Activity Level - Current:   in bed.   Lift room needed: No.   Bariatric: No   Needed: No   Isolation: No.   Infection: Not Applicable.     Respiratory status: Room air    Vital Signs (within 30 minutes):   Vitals:    01/10/24 2230 01/11/24 0514 01/11/24 0733 01/11/24 1100   BP: 107/70  116/73 115/76   BP Location:   Right arm Right arm   Pulse: 87  93 93   Resp:  18 18 18   Temp:  97.4  F (36.3  C) 97.2  F (36.2  C) 97  F (36.1  C)   TempSrc:  Oral Axillary Axillary   SpO2: 96%   97%   Weight:           Cardiac Rhythm:  ,      Pain level:    Patient confused: intermittent confusion  Patient Falls Risk: room door open/family at bedside.   Elimination Status: Has voided     Patient Report - Initial Complaint: altered mental status.   Focused Assessment: pt AOX3-4. Responses can be delayed at times. Ascites noted.      Abnormal Results:   Labs Ordered and Resulted from Time of ED Arrival to Time of ED Departure   INR - Abnormal       Result Value    INR 1.30 (*)    LIPASE - Abnormal    Lipase 455 (*)    BASIC METABOLIC PANEL - Abnormal    Sodium 130 (*)     Potassium 4.5      Chloride 91 (*)     Carbon Dioxide (CO2) 23      Anion Gap 16 (*)     Urea Nitrogen 12.7      Creatinine 0.54      GFR Estimate >90      Calcium 13.9 (*)     Glucose 91     HEPATIC FUNCTION PANEL - Abnormal    Protein Total 6.0 (*)     Albumin 2.9 (*)     Bilirubin Total 4.4 (*)     Alkaline Phosphatase 296 (*)      (*)     ALT 46      Bilirubin Direct 3.51 (*)    ROUTINE UA WITH MICROSCOPIC REFLEX TO CULTURE - Abnormal    Color Urine Dark Yellow (*)     Appearance Urine Cloudy (*)     Glucose Urine Negative       Bilirubin Urine Small (*)     Ketones Urine Trace (*)     Specific Gravity Urine 1.025      Blood Urine Negative      pH Urine 5.0      Protein Albumin Urine 30 (*)     Urobilinogen Urine 8.0 (*)     Nitrite Urine Negative      Leukocyte Esterase Urine Negative      Bacteria Urine Many (*)     Mucus Urine Present (*)     Calcium Oxalate Crystals Urine Moderate (*)     RBC Urine 2      WBC Urine 1      Squamous Epithelials Urine 4 (*)     Hyaline Casts Urine 53 (*)     Granular Casts Urine 3 (*)    IONIZED CALCIUM - Abnormal    Calcium Ionized Whole Blood 7.8 (*)    CBC WITH PLATELETS AND DIFFERENTIAL - Abnormal    WBC Count 40.4 (*)     RBC Count 3.12 (*)     Hemoglobin 9.2 (*)     Hematocrit 28.4 (*)     MCV 91      MCH 29.5      MCHC 32.4      RDW 22.0 (*)     Platelet Count 416      % Neutrophils 83      % Lymphocytes 4      % Monocytes 9      % Eosinophils 0      % Basophils 0      % Immature Granulocytes 4      NRBCs per 100 WBC 0      Absolute Neutrophils 33.7 (*)     Absolute Lymphocytes 1.5      Absolute Monocytes 3.5 (*)     Absolute Eosinophils 0.0      Absolute Basophils 0.1      Absolute Immature Granulocytes 1.5 (*)     Absolute NRBCs 0.0     LACTIC ACID WHOLE BLOOD - Abnormal    Lactic Acid 3.8 (*)    LACTIC ACID WHOLE BLOOD - Abnormal    Lactic Acid 2.8 (*)    COMPREHENSIVE METABOLIC PANEL - Abnormal    Sodium 131 (*)     Potassium 4.8      Carbon Dioxide (CO2) 17 (*)     Anion Gap 14      Urea Nitrogen 14.2      Creatinine 0.68      GFR Estimate >90      Calcium 12.0 (*)     Chloride 100      Glucose 91      Alkaline Phosphatase 240 (*)      (*)     ALT 37      Protein Total 5.1 (*)     Albumin 2.4 (*)     Bilirubin Total 4.8 (*)    CBC WITH PLATELETS - Abnormal    WBC Count 46.8 (*)     RBC Count 2.75 (*)     Hemoglobin 8.1 (*)     Hematocrit 25.0 (*)     MCV 91      MCH 29.5      MCHC 32.4      RDW 22.4 (*)     Platelet Count 356     LACTIC ACID WHOLE BLOOD - Abnormal    Lactic Acid 3.3 (*)     LACTIC ACID WHOLE BLOOD - Abnormal    Lactic Acid 2.9 (*)    GLUCOSE BY METER - Abnormal    GLUCOSE BY METER POCT 100 (*)    PARTIAL THROMBOPLASTIN TIME - Normal    aPTT 29     AMMONIA - Normal    Ammonia 42     GLUCOSE BY METER - Normal    GLUCOSE BY METER POCT 72     GLUCOSE BY METER - Normal    GLUCOSE BY METER POCT 79     GLUCOSE BY METER - Normal    GLUCOSE BY METER POCT 97     BLOOD CULTURE - Normal    Culture No growth after 12 hours     BLOOD CULTURE - Normal    Culture No growth after 12 hours     GLUCOSE MONITOR NURSING POCT   GLUCOSE MONITOR NURSING POCT   GLUCOSE MONITOR NURSING POCT   GLUCOSE MONITOR NURSING POCT   GLUCOSE MONITOR NURSING POCT   TYPE AND SCREEN, ADULT    ABO/RH(D) O POS      Antibody Screen Negative      SPECIMEN EXPIRATION DATE [id number]     URINE CULTURE   ABO/RH TYPE AND SCREEN        US Abdomen Limited (RUQ)   Final Result   IMPRESSION:      1.  Numerous metastases within both hepatic lobes, better visualized on the recent CT.      2.  No intrahepatic biliary ductal dilation. Common bile duct is not well visualized.      3.  Gallbladder contains sludge. There is mild gallbladder wall thickening, which is nonspecific in the setting of liver disease and ascites. No specific signs of acute cholecystitis.      4.  Small amount of ascites in the right upper quadrant.      CT Head w/o Contrast   Final Result   IMPRESSION:   1.  Mild age-related changes without acute intracranial abnormality.          Treatments provided: IVF, IV ABX  Family Comments: family at bedside  OBS brochure/video discussed/provided to patient:  N/A  ED Medications:   Medications   sodium chloride 0.9 % infusion ( Intravenous Rate/Dose Verify 1/11/24 0950)   FLUoxetine (PROzac) capsule 20 mg (20 mg Oral $Given 1/11/24 0847)   lidocaine 1 % 0.1-1 mL (has no administration in time range)   lidocaine (LMX4) cream (has no administration in time range)   sodium chloride (PF) 0.9% PF flush 3 mL (3 mLs  Intracatheter Not Given 1/11/24 0841)   sodium chloride (PF) 0.9% PF flush 3 mL (has no administration in time range)   senna-docusate (SENOKOT-S/PERICOLACE) 8.6-50 MG per tablet 1 tablet (has no administration in time range)     Or   senna-docusate (SENOKOT-S/PERICOLACE) 8.6-50 MG per tablet 2 tablet (has no administration in time range)   enoxaparin ANTICOAGULANT (LOVENOX) injection 40 mg (40 mg Subcutaneous $Given 1/10/24 2340)   oxyCODONE (ROXICODONE) tablet 5 mg (has no administration in time range)   oxyCODONE (ROXICODONE) tablet 10 mg (has no administration in time range)   senna-docusate (SENOKOT-S/PERICOLACE) 8.6-50 MG per tablet 1 tablet (1 tablet Oral $Given 1/11/24 0852)     Or   senna-docusate (SENOKOT-S/PERICOLACE) 8.6-50 MG per tablet 2 tablet ( Oral See Alternative 1/11/24 0852)   ondansetron (ZOFRAN ODT) ODT tab 4 mg (has no administration in time range)     Or   ondansetron (ZOFRAN) injection 4 mg (has no administration in time range)   prochlorperazine (COMPAZINE) injection 10 mg (has no administration in time range)     Or   prochlorperazine (COMPAZINE) tablet 10 mg (has no administration in time range)     Or   prochlorperazine (COMPAZINE) suppository 25 mg (has no administration in time range)   piperacillin-tazobactam (ZOSYN) 3.375 g vial to attach to  mL bag (0 g Intravenous Stopped 1/11/24 1210)   acetaminophen (TYLENOL) tablet 975 mg (975 mg Oral $Given 1/10/24 2339)   glucose gel 15-30 g (has no administration in time range)     Or   dextrose 50 % injection 25-50 mL (has no administration in time range)     Or   glucagon injection 1 mg (has no administration in time range)   insulin aspart (NovoLOG) injection (RAPID ACTING) ( Subcutaneous Not Given 1/11/24 1210)   LORazepam (ATIVAN) tablet 0.5 mg (has no administration in time range)   albuterol (PROVENTIL) neb solution 2.5 mg (has no administration in time range)   FLUoxetine (PROzac) capsule 10 mg (has no administration in time  "range)   calcitonin (MIACALCIN) injection 292 Units (292 Units Subcutaneous $Given 1/11/24 1317)   sodium chloride 0.9% BOLUS 1,000 mL (0 mLs Intravenous Stopped 1/11/24 0034)   piperacillin-tazobactam (ZOSYN) 4.5 g vial to attach to  mL bag (0 g Intravenous Stopped 1/11/24 0034)   calcitonin (MIACALCIN) injection 292 Units (292 Units Subcutaneous $Given 1/10/24 2307)   sodium chloride 0.9% BOLUS 1,000 mL (0 mLs Intravenous Stopped 1/11/24 0518)       Drips infusing:  Yes; IVF  For the majority of the shift this patient was Green.   Interventions performed were n/a.    Sepsis treatment initiated: Yes    Per the ED Provider, Time Zero for severe sepsis or septic shock is:      3 Hour Severe Sepsis Bundle Completion:  1. Initial Lactic Acid Result:   Recent Labs   Lab Test 01/11/24  1201 01/11/24  0638 01/11/24  0051   LACT 2.9* 3.3* 2.8*     2. Blood Cultures before Antibiotics: Yes  3. Broad Spectrum Antibiotics Administered:     Anti-infectives (From now, onward)      Start     Dose/Rate Route Frequency Ordered Stop    01/11/24 0400  piperacillin-tazobactam (ZOSYN) 3.375 g vial to attach to  mL bag        Note to Pharmacy: For [facility], [facility] and [facility]: For Zosyn-naive patients, use the \"Zosyn initial dose + extended infusion\" order panel.    3.375 g  over 30 Minutes Intravenous EVERY 6 HOURS 01/10/24 2324            4. 2000 ml of IV fluids have been given so far      6 Hour Severe Sepsis Bundle Completion:    1. Repeat Lactic Acid Level: Last result   Lab Results   Component Value Date    LACT 2.9 (H) 01/11/2024     2. Patient currently on Vasopressors =  No    Cares/treatment/interventions/medications to be completed following ED care: IV ABX, IVF, palliative care    ED Nurse Name: Swati Paulino RN  1:39 PM   RECEIVING UNIT ED HANDOFF REVIEW    Above ED Nurse Handoff Report was reviewed: Yes  Reviewed by: Sherrie Castellanos RN on January 11, 2024 at 1:56 PM    "

## 2024-01-11 NOTE — PROGRESS NOTES
[facility]    Medicine Progress Note - Hospitalist Service    Date of Admission:  1/10/2024    Assessment & Plan   Summary: Lani Walker is a 63 year old female with PMHx of metastatic pancreatic cancer (contemplating 3rd line therapy under management of Dr. Melara), who was admitted on 1/10/2024 with confusion and weakness, FTH hypercalcemia.     Encephalopathy, likely multifactorial  Hypercalcemia   Leukocytosis   Presenting with 1 day of confusion and weakness. Workup notable for a calcium of 13.9 (ionized 7.8), WBC of 40.4 with more than 80% neutrophils, no acute pathology on CT head.   Received IVF and calcitonin in ER. Mental status had improvement after IVF. Ca responded somewhat (13.9->12).  WBC has been rising based on review of labs from [place] [facility] with most recent count 23.2 on 1/8/24. Unclear etiology, but possible infection must be considered. UA negative, COVID/flu/RSV negative, US abdomen unrevealing. Patient did report some increased dyspnea. Had recent CT CAP 1/3/24 that did not show specific cause of dyspnea, but does document marked interval progression of disease. TTE was hyperdynamic but also did not reveal cause of dyspnea.  - generous fluid support with NS  - Calcitonin q12  - Consider zoledronic acid   - follow up blood and urine cultures  - empiric zosyn    Metastatic pancreatic adenocarcinoma    Follows with Dr. Melara. Initially did well with FOLFOX, but subsequently recurred. Most recently on gemcitabine and abraxane as of 12/26/23.   - Oncology consult    Pancreatic insufficiency  PTA on 12-18U lantus. Has not been eating much recently. BG in 100s.  - sliding scale insulin    Goals of care  Daughters at bedside. Seem understanding that chemotherapy may not be an option. Unfortunately, they have not had a formal discussion about code status.  - Palliative care consult        Diet: Combination Diet Regular Diet Adult    DVT Prophylaxis: Enoxaparin (Lovenox)  SQ  Malin Catheter: Not present  Lines: PRESENT      Port a Cath 01/10/24 Single Lumen Right Chest wall-Site Assessment: WDL      Cardiac Monitoring: None  Code Status: Full Code      Clinically Significant Risk Factors Present on Admission           # Hypercalcemia: Highest Ca = 13.9 mg/dL in last 2 days, will monitor as appropriate    # Hypoalbuminemia: Lowest albumin = 2.4 g/dL at 1/11/2024  6:38 AM, will monitor as appropriate  # Coagulation Defect: INR = 1.30 (Ref range: 0.85 - 1.15) and/or PTT = 29 Seconds (Ref range: 22 - 38 Seconds), will monitor for bleeding    # Hypertension: Noted on problem list                 Disposition Plan      Expected Discharge Date: 01/12/2024                    Olinda Farfan,   Hospitalist Service  [facility]  Securely message with [employer] (more info)  Text page via [employer] [place] Paging/Directory   ______________________________________________________________________    Interval History   No acute overnight events.  Doing better today. Seems clearer to family. Does not have any specific complaints - denies ROS.    Physical Exam   Vital Signs: Temp: 97.2  F (36.2  C) Temp src: Axillary BP: 116/73 Pulse: 93   Resp: 18 SpO2: 96 % O2 Device: None (Room air)    Weight: 161 lbs 0 oz    Constitutional: Awake, alert, no distress, and cooperative  Cardiovascular: Regular rate and rhythm, normal S1 and S2, no S3 or S4, and no murmur noted  Respiratory: No increased work of breathing, good air exchange, clear to auscultation bilaterally, no crackles or wheezing  Gastrointestinal: Abdomen soft, non-tender, distended. BS normal. No masses, organomegaly     Medical Decision Making       45 MINUTES SPENT BY ME on the date of service doing chart review, history, exam, documentation & further activities per the note.      Data     I have personally reviewed the following data over the past 24 hrs:    46.8 (H)  \   8.1 (L)   / 356     131 (L) 100 14.2 /  124 (H)   4.8 17  (L) 0.68 \     ALT: 37 AST: 148 (H) AP: 240 (H) TBILI: 4.8 (H)   ALB: 2.4 (L) TOT PROTEIN: 5.1 (L) LIPASE: 455 (H)     Procal: N/A CRP: N/A Lactic Acid: 2.9 (H)       INR:  1.30 (H) PTT:  29   D-dimer:  N/A Fibrinogen:  N/A

## 2024-01-11 NOTE — PLAN OF CARE
[facility]    ED Boarding Nurse Handoff Addendum Report:    Date/time: 1/11/2024, 6:06 AM    Activity Level: assist of 1, not oob     Fall Risk: Yes:  nonskid shoes/slippers when out of bed, arm band in place, assistive device/personal items within reach, and activity supervised    Active Infusions: PIV NS     Current Meds Due: See Mar    Current care needs: IV fluids    Oxygen requirements (liters/min and/or FiO2): none    Respiratory status: Room air    Vital signs (within last 30 minutes):    Vitals:    01/10/24 2211 01/10/24 2212 01/10/24 2230 01/11/24 0514   BP:   107/70    Pulse: 84 87 87    Resp:    18   Temp:    97.4  F (36.3  C)   TempSrc:    Oral   SpO2: 96% 96% 96%    Weight:           Focused assessment within last 30 minutes:    Alert to self, intermittent confusion, denies pain, no non-verbal indications of pain observed, reg diet,pt not OOB during shift, BG 72    ED Boarding Nurse name: Bhavana Alexandre RN

## 2024-01-11 NOTE — PLAN OF CARE
Goal Outcome Evaluation:  Plan of Care Reviewed With: patient, child   Arrived to unit @ 1411. Alert but disoriented x3. Confused. Unable to answer most of the question. Assist x1 to 2 using a gait belt & walker. Port in right chest. NS infusing at 100ml. Special precaution initiated to rule ou COVID. Sample sent to lab. Result pending. C/O generalized bone pain. PRN Oxycodone and Zofran given x1. Family at bedside.

## 2024-01-12 NOTE — CONSULTS
"SPIRITUAL HEALTH SERVICES  SPIRITUAL ASSESSMENT Consult Note  [employer]. [address] medical     REFERRAL SOURCE: Consult placed by palliative APRN for pt asking for spiritual/emotional support.    Brief visit with Lani, her daughter Phyllis and two visitors that Lani introduced as her \"bff's.\"    Lani woke briefly as I entered and affirmed her [employer] bud, did not interact the rest of the visit.    Phyllis states her mom has attended a parish in [place], not sure which one. She spoke of a cross necklace that was blessed by the  that is meaningful to her mom and that she will welcome  support and prayer when she is more alert. Phyllis welcomed the suggestion of  follow up Monday as she anticipates her mom being hospitalized 'for the next week or so.'    Plan: I have entered a request for  follow up Monday.     Addendum: Note in reference to consult with palliative APRN: Per Lani's daughter her mom's bud is very important to her and she would want to have the [employer] of the [employer] provided when able. Recommend further  assessment regarding this possible request on Monday.    Katalina Lowery MDiv [facility]  Staff   Please place consult order for routine Spiritual Health Services referrals.  SHS available 24/7 for emergent requests either by having the on-call  paged or by entering an ASAP/STAT consult in Epic (this will also page the on-call ).    "

## 2024-01-12 NOTE — PROGRESS NOTES
Oncology/Hematology Follow Up Note:    Assessment and Plan:  #1 Metastatic pancreatic cancer  - Disease recently progressed on FOLFIRINOX and Gemcitabine/Abraxane  - No standard treatment option left  - Blood sample sent for NGS testing.  Also scheduled for liver biopsy next week to send fresh sample for NGS testing, to see if there is any targetable mutation or if her disease could be treated with immunotherapy, but it is unlikely we will find anything that would open up new treatment options.  - Patient's functional status has declined rapidly over the past month    PLAN:  - Ongoing goals of care discussion with the patient and her family.  Lani is still very confused due to hypercalcemia, and I was not able to connect with her daughter this evening.  Will try again tomorrow, and hopefully Lani's mental status will also be more clear tomorrow with further correction of hypercalcemia    #2 Hypercalcemia of malignancy  - Calcitonin x 2 and IVF  - Ca 12.0  - Still very confused    PLAN:  - Will give Zometa 4 mg overnight    #3 Leukocytosis  #4 Hyperbilirubinemia  - WBC up to 46K today  - Blood and urine cultures pending  - Bili is also up to 4.6.  She has RUQ abd pain  - U/S showed no intrahepatic biliary ductal dilatation.  Common bile duct not very well visualized.  Gallbladder contains sludge    PLAN:  - Cannot rule out cholangitis with rapidly rising WBC and bilirubin, in the setting of RUQ abdominal pain, but rising bilirubin could also be related to disease progression in the liver  - Will consult GI to see if she would benefit from ERCP    #5 Goals of care  - Will continue to address with patient and her family.  Hopefully she can be more involved in decision making once her mental status clears up more with correction of hypercalcemia.  I am concerned her function status has declined rapidly over the past month.  A month ago, she was still doing relatively well and still traveling.  She was able to come  to clinic to see me on Monday and ambulate on her own.  Now she requires 2 person assist to get out of bed.  If the rapid decline is due to progression of cancer instead of something more reversible, she would not benefit from any further treatment of her metastatic pancreatic cancer    Pramod Melara M.D.  [facility]  [phone]      Subjective:    Patient was admitted to the hospital yesterday for confusion and generalized weakness.  She has been started on IVF and was given calcitonin with improvement of Ca but it remains elevated at 12.0.  Patient was still very confused when I saw her this evening.    Scheduled Medications:  Reviewed active medications    Labs:  CBC RESULTS:   Recent Labs   Lab Test 01/11/24  0638 01/10/24  1939 06/02/22  1012   WBC 46.8* 40.4* 7.2   HGB 8.1* 9.2* 12.8   HCT 25.0* 28.4* 36.7   MCV 91 91 91    416 288       CMP  Recent Labs   Lab 01/11/24 2011 01/11/24  1655 01/11/24  1156 01/11/24  0811 01/11/24  0638 01/11/24  0027 01/10/24  1920   NA  --   --   --   --  131*  --  130*   POTASSIUM  --   --   --   --  4.8  --  4.5   CHLORIDE  --   --   --   --  100  --  91*   CO2  --   --   --   --  17*  --  23   ANIONGAP  --   --   --   --  14  --  16*   * 124* 100* 97 91   < > 91   BUN  --   --   --   --  14.2  --  12.7   CR  --   --   --   --  0.68  --  0.54   GFRESTIMATED  --   --   --   --  >90  --  >90   SKINNY  --   --   --   --  12.0*  --  13.9*   PROTTOTAL  --   --   --   --  5.1*  --  6.0*   ALBUMIN  --   --   --   --  2.4*  --  2.9*   BILITOTAL  --   --   --   --  4.8*  --  4.4*   ALKPHOS  --   --   --   --  240*  --  296*   AST  --   --   --   --  148*  --  136*   ALT  --   --   --   --  37  --  46    < > = values in this interval not displayed.       INR  Recent Labs   Lab 01/10/24  1920   INR 1.30*       Objective/Physical Exam:  Blood pressure 97/51, pulse 87, temperature 97.9  F (36.6  C), temperature source Oral, resp. rate 20, weight 76.1 kg (167 lb 12.3 oz),  SpO2 96%, not currently breastfeeding.  General appearance:Awake, alert, but confused.  Oriented to person and place.  HEENT:Mild scleral icterus  Lungs: chest is clear to auscultation  Abdomen: soft, RUQ and epigastric area tender to palpation.  Normal BS.  Ext: Trace edema    Pramod Melara MD  [facility]  1/11/2024 8:32 PM

## 2024-01-12 NOTE — PLAN OF CARE
End of Shift Summary  For vital signs and complete assessments, please see documentation flowsheets.     Pertinent assessments: lethargic and confused, oriented to self and time, alarms on for safety, VSS, afebrile, room air, bowel sounds hypo, lung sounds diminished, skin dry/flaky--moisturizer applied, PORT infusing, denies pain/nausea     Major Shift Events: admit, zometa given x1    Treatment Plan: oncology/palliative/nutrition/spiritual health and GI consults, zosyn, symptom management    Bedside Nurse: Sruthi Reyes RN           Goal Outcome Evaluation:      Plan of Care Reviewed With: patient, spouse, child    Overall Patient Progress: no changeOverall Patient Progress: no change

## 2024-01-12 NOTE — CONSULTS
Attestation signed by Eugene Fiore MD at 1/12/2024  2:08 PM (Updated)     Gastroenterology Consult in Conjunction with Advanced Practice Provider   The patient was seen and evaluated in conjunction with the mid-level provider. Please see their note for details. All labs and imaging studies have been reviewed.      Objective   Patient is a 63-year-old female who was diagnosed with metastatic pancreatic cancer in June 2022 after presenting with abdominal pain.  She was treated with FOLFIRINOX but had relapse.  She was then treated with gemcitabine and Abraxane but had progression of disease on this as well.  CT dated 1/3/2024 shows progression of disease.     She was seen in the emergency room due to confusion and generalized weakness.  She has had very little appetite.  She has been using narcotic pain medication for left upper quadrant pain.     CT shows innumerable new hepatic metastases with increased size of pancreatic tail mass and increased invasion of adjacent structures.  There are newly enlarged thoracic lymph nodes and a new right lung nodule.  Ultrasound done January 10, 2024 did not reveal any intrahepatic biliary ductal dilatation.  The common bile duct was not well-visualized.      Vitals Blood pressure 105/64, pulse 83, temperature 98  F (36.7  C), temperature source Oral, resp. rate 20, weight 76.1 kg (167 lb 12.3 oz), SpO2 95%, not currently breastfeeding.            Physical Exam  General: awake, alert, oriented times three   Neurologic: grossly intact         Laboratory     Electrolytes              Recent Labs   Lab 01/12/24  0549 01/12/24  0447 01/12/24  0018 01/11/24  0811 01/11/24  0638 01/11/24  0027 01/10/24  1920     --   --   --  131*  --  130*   POTASSIUM 4.5  --   --   --  4.8  --  4.5   CHLORIDE 102  --   --   --  100  --  91*   CO2 20*  --   --   --  17*  --  23   * 115* 122*   < > 91   < > 91   CR 1.16*  --   --   --  0.68  --  0.54   BUN 21.1  --   --   --   14.2  --  12.7    < > = values in this interval not displayed.      Hematology             Recent Labs   Lab 01/12/24  0937 01/12/24  0549 01/11/24  0638 01/10/24  1939 01/10/24  1939 01/10/24  1920   HGB  --  8.1* 8.1*  --  9.2*  --    MCV  --  93 91  --  91  --    WBC  --  41.6* 46.8*  --  40.4*  --     334 356   < > 416  --    INR 1.39*  --   --   --   --  1.30*    < > = values in this interval not displayed.      LFTs & Lipase          Recent Labs   Lab 01/12/24  0549 01/11/24  0638 01/10/24  1920   * 148* 136*   ALT 43 37 46   ALKPHOS 221* 240* 296*   BILITOTAL 4.8* 4.8* 4.4*   LIPASE  --   --  455*         I have reviewed the current diagnostic and laboratory tests.            Impression and Plan    Known metastatic pancreatic cancer with a innumerable liver mets.  Came in with confusion and generalized weakness.  She has an elevated white count and bilirubin which has raised concern for cholangitis.  She is on empiric antibiotics.  Pancreatic mass is within the tail of the pancreas.  Given the lack of biliary ductal dilatation, I do not think that ERCP is likely to benefit patient.  In fact, I think ERCP would potentially worsen the case as bacteria would be injected up into the bile duct into areas that may not be able to drain causing cholangitis.  The elevated LFTs are most likely secondary to the liver mets seen.  This case was reviewed with the biliary provider scheduled to do the ERCP this afternoon.  We both agree that ERCP is not likely to be beneficial to the patient.  MRCP could be considered to see if stentable lesion is present however I suspect that this will not .     GI will sign off. Please feel free to call with questions.      45 minutes of total time was spent providing patient care including patient evaluation, reviewing documentation/test results, and .                                                 Eugene Fiore MD  Thank you for the  opportunity to participate in the care of this patient.   Please feel free to call me with any questions or concerns.  Phone number [phone].                    GASTROENTEROLOGY CONSULTATION      Lani Walker  [address] [id number]  63 year old female     Admission Date/Time: 1/10/2024  Primary Care Provider: Francine Lombardi  Referring / Attending Physician: Dr. Melara     We were asked to see the patient in consultation by Dr. Melara for evaluation of pancreatic cancer with possible biliary obstruction.        HPI:  Lani Walker is a 63 year old female with metastatic pancreatic cancer (followed by [place] [facility]) who came to the emergency room with significant confusion and generalized weakness which began about 1 day prior to admission.  Gastroenterology was consulted by oncology to evaluate for biliary obstruction and possible cholangitis.    Patient is a difficult historian.  She does have family present who provide additional collateral information.  Per medical record, patient was diagnosed with metastatic pancreatic cancer in June 2022.  She was treated initially with chemotherapy but unfortunately has relapsed.  She does have progression of her disease on CT imaging dated 1/3/2024. In the emergency room she was found to have significant elevated calcium.  Her white count is elevated at 46 and her bilirubin is 4.8.  , ALT 43, alkaline phosphatase 221.  Creatinine 1.16 this morning.    Per patient and family she has had very little appetite.  There is not been nausea or vomiting.  No reported fever.  She has been using narcotic pain medication for left upper quadrant pain.    A CT scan of the chest abdomen pelvis was completed as an outpatient 1/3/2024.  This revealed innumerable new hepatic metastases, increased size of left pancreatic tail mass with increased invasion of adjacent structures.  There are newly enlarged thoracic lymph nodes and new right lung  nodule.  A abdominal ultrasound completed 1/10/2024 did not reveal any intrahepatic biliary ductal dilation.  The common bile duct was not well-visualized.  In review of her medical record, does not appear that she has ever undergone pancreatic or biliary stenting.       PAST MEDICAL HISTORY:  Patient Active Problem List    Diagnosis Date Noted    Hypercalcemia 01/10/2024     Priority: Medium    Metabolic encephalopathy 01/10/2024     Priority: Medium    Leukocytosis, unspecified type 01/10/2024     Priority: Medium    Mild episode of recurrent major depressive disorder (H24) 11/18/2021     Priority: Medium    Hypercholesteremia 01/24/2018     Priority: Medium    Essential hypertension, benign 01/09/2018     Priority: Medium    CARDIOVASCULAR SCREENING; LDL GOAL LESS THAN 130 01/09/2018     Priority: Medium          ROS: A comprehensive ten point review of systems was negative aside from those in mentioned in the HPI.       MEDICATIONS:   Prior to Admission medications    Medication Sig Start Date End Date Taking? Authorizing Provider   albuterol (PROAIR HFA/PROVENTIL HFA/VENTOLIN HFA) 108 (90 Base) MCG/ACT inhaler INHALE 2 PUFFS BY MOUTH EVERY 6 HOURS AS NEEDED FOR SHORTNESS OF BREATH OR DIFFICULT BREATHING OR WHEEZING 11/29/22  Yes Francine Lombardi APRN CNP   FLUoxetine (PROZAC) 10 MG capsule Take 20 mg by mouth every morning   Yes Unknown, Entered By History   FLUoxetine (PROZAC) 10 MG capsule Take 10 mg by mouth every evening In addition to 20 mg in the morning.   Yes Unknown, Entered By History   insulin glargine (LANTUS PEN) 100 UNIT/ML pen Inject 12-18 Units Subcutaneous at bedtime 10/24/23  Yes Alicia Munguia MD   LORazepam (ATIVAN) 0.5 MG tablet Take 0.5 mg by mouth every 6 hours as needed for anxiety   Yes Unknown, Entered By History   OLANZapine (ZYPREXA) 2.5 MG tablet Take 2.5 mg by mouth See Admin Instructions TAKE 1 TABLET BY MOUTH ONCE DAILY AT BEDTIME ON DAYS 1-4 OF CHEMO   Yes Unknown,  Entered By History   oxyCODONE (ROXICODONE) 5 MG tablet Take 5 mg by mouth every 6 hours as needed for severe pain   Yes Unknown, Entered By History   prochlorperazine (COMPAZINE) 10 MG tablet Take 10 mg by mouth every 6 hours as needed for nausea or vomiting   Yes Unknown, Entered By History   blood glucose (NO BRAND SPECIFIED) test strip Use to test blood sugar 2 times daily (before breakfast and before evening meal) or as directed. To accompany: Blood Glucose Monitor Brands: per insurance. 10/24/23   Alicia Munguia MD   Blood Glucose Monitoring Suppl (ACCU-CHEK GUIDE ME) w/Device KIT USE TO TEST BLOOD SUGAR TWO TIMES DAILY (BEFORE BREAKFAST AND BEFORE EVENING MEAL) 11/21/23   Alicia Munguia MD   Continuous Blood Gluc Sensor (FREESTYLE AMI 3 SENSOR) MISC 1 Device every 14 days 11/9/23   Francine Lombardi APRN CNP   insulin pen needle (31G X 8 MM) 31G X 8 MM miscellaneous Use 1 pen needles daily or as directed. 10/24/23   Alicia Munguia MD   thin (NO BRAND SPECIFIED) lancets Use with lanceting device to test blood sugars 2 times daily (before breakfast and before evening meal) . To accompany: Blood Glucose Monitor Brands: per insurance. 10/24/23   Alicia Munguia MD        ALLERGIES: No Known Allergies     SOCIAL HISTORY:  Social History     Tobacco Use    Smoking status: Every Day    Smokeless tobacco: Never    Tobacco comments:     10 cigs per day   Vaping Use    Vaping Use: Never used   Substance Use Topics    Alcohol use: Yes     Comment: once weekly 4-5 drinks     Drug use: No        FAMILY HISTORY:  Family History   Problem Relation Age of Onset    Alzheimer Disease Mother     Heart Surgery Father         stent    Lung Cancer Father 88        non-smokerr    Diabetes Maternal Grandfather     Breast Cancer Maternal Aunt     Ovarian Cancer Maternal Aunt     Alzheimer Disease Maternal Aunt     Alzheimer Disease Maternal Aunt     Stomach Cancer Maternal Aunt     Asthma No family hx of      Thyroid Disease No family hx of     Colon Cancer No family hx of         PHYSICAL EXAM:     /64 (BP Location: Left arm)   Pulse 83   Temp 98  F (36.7  C) (Oral)   Resp 20   Wt 76.1 kg (167 lb 12.3 oz)   LMP  (LMP Unknown)   SpO2 95%   BMI 26.67 kg/m       PHYSICAL EXAM:  GENERAL: Chronically ill-appearing, pale  SKIN: no suspicious lesions, rashes  HEAD: Normocephalic. Atraumatic.  NECK: Neck supple. No adenopathy.   EYES: No scleral icterus  GASTROINTESTINAL: soft, epigastric and left upper quadrant tenderness with diffuse tenderness throughout abdomen, non distended, no guarding/rebound  JOINT/EXTREMITIES:  no gross deformities noted, normal muscle tone  NEURO: CN 2-12 grossly intact, no focal deficits  PSYCH: Normal affect        ADDITIONAL COMMENTS:   I reviewed the patient's new clinical lab test results.     Recent Labs   Lab 01/12/24  0937 01/12/24  0549 01/11/24  0638 01/10/24  1939   WBC  --  41.6* 46.8* 40.4*   RBC  --  2.74* 2.75* 3.12*   HGB  --  8.1* 8.1* 9.2*   HCT  --  25.4* 25.0* 28.4*   MCV  --  93 91 91   MCH  --  29.6 29.5 29.5   MCHC  --  31.9 32.4 32.4   RDW  --  23.9* 22.4* 22.0*    334 356 416     Recent Labs   Lab Test 01/12/24  0549 01/11/24  0638 01/10/24  1920   POTASSIUM 4.5 4.8 4.5   CHLORIDE 102 100 91*   CO2 20* 17* 23   BUN 21.1 14.2 12.7   ANIONGAP 13 14 16*     Recent Labs   Lab Test 01/12/24  0549 01/11/24  0638 01/10/24  2311 01/10/24  1920 06/02/22  1012 03/02/22  1221   ALBUMIN 2.3* 2.4*  --  2.9*   < >  --    BILITOTAL 4.8* 4.8*  --  4.4*   < >  --    ALT 43 37  --  46   < >  --    * 148*  --  136*   < >  --    PROTEIN  --   --  30*  --   --  Negative   LIPASE  --   --   --  455*  --   --     < > = values in this interval not displayed.       Recent Labs   Lab 01/12/24  0937 01/10/24  1920   INR 1.39* 1.30*     Recent Labs   Lab 01/10/24  1920   LIPASE 455*           CONSULTATION ASSESSMENT AND PLAN:    Lani Walker is a 63 year old female with  metastatic pancreatic cancer (followed by [place] [facility]) who came to the emergency room with significant confusion and generalized weakness which began about 1 day prior to admission.  Gastroenterology was consulted by oncology to evaluate for biliary obstruction and possible cholangitis.    1.  Suspected biliary obstruction/cholangitis: Given rising white count and bilirubin, concern for cholangitis.  She is being covered on empiric antibiotics-Zosyn.  Recent CT scan from January 3 does show significant progression of her disease with enlarging pancreatic tail mass and multiple new liver mets.  It does not appear that she has had any pancreatic or biliary stenting in the past.  She is afebrile.     -- Follow LFTs, CBC, INR  -- Blood culture x 2 no growth after 1 day, urine culture no growth.  -- After CT imaging evaluation by biliary team, ERCP not recommended.   -- Ongoing management by oncology.    I discussed the patient plan with Nelsy Oh and Adebayo, GI staff physician. Thank you for asking us to participate in the care of this patient.     70 min of total time was spent providing patient care, including patient evaluation, reviewing documentation/ test results, and .     Tiffanie Resendiz PA-C  [facility] ( [facility])

## 2024-01-12 NOTE — PROGRESS NOTES
[facility]    Medicine Progress Note - Hospitalist Service    Date of Admission:  1/10/2024    Assessment & Plan   Summary: Lani Walker is a 63 year old female with PMHx of metastatic pancreatic cancer (contemplating 3rd line therapy under management of Dr. Melara), who was admitted on 1/10/2024 with confusion and weakness, FTH hypercalcemia.     Encephalopathy, likely multifactorial  Hypercalcemia   Leukocytosis   Presenting with 1 day of confusion and weakness. Workup notable for a calcium of 13.9 (ionized 7.8), WBC of 40.4 with more than 80% neutrophils, no acute pathology on CT head.   Received IVF and calcitonin in ER. Mental status had improvement after IVF. Ca responding to calcitonin (13.9->12->11.1).  WBC has been rising based on review of labs from [place] [facility] with most recent count 23.2 on 1/8/24. Unclear etiology, but possible infection must be considered. UA negative, COVID/flu/RSV negative, US abdomen unrevealing. Patient did report some increased dyspnea. Had recent CT CAP 1/3/24 that did not show specific cause of dyspnea, but does document marked interval progression of disease. TTE was hyperdynamic but also did not reveal cause of dyspnea.  Given elevated WBC and bilirubin, H/O consulted GI for possible cholangitis. GI felt ERCP is unlikely to benefit patient as there is no ductal dilation and could potentially worsen the case as bacteria would be injected up into the bile duct.   - generous fluid support with NS  - Calcitonin q12  - Zoledronic acid given 1/11  - follow up blood and urine cultures  - empiric zosyn    Metastatic pancreatic adenocarcinoma    Follows with Dr. Melara. Initially did well with FOLFOX, but subsequently recurred. Most recently on gemcitabine and abraxane as of 12/26/23.   - Oncology following    Pancreatic insufficiency  PTA on 12-18U lantus. Has not been eating much recently. BG in 100s.  - sliding scale insulin    Goals of care  Daughters at  bedside. Seem understanding that chemotherapy will likely not be an option. Unfortunately, they have not had a formal discussion about code status.  - Palliative care consult          Diet: Snacks/Supplements Adult: Ensure Enlive; With Meals  Regular Diet Adult    DVT Prophylaxis: Enoxaparin (Lovenox) SQ  Malin Catheter: Not present  Lines: PRESENT      Port a Cath 06/15/22 Single Lumen Right Chest wall-Site Assessment: WDL      Cardiac Monitoring: None  Code Status: Full Code      Clinically Significant Risk Factors           # Hypercalcemia: Highest Ca = 13.9 mg/dL in last 2 days, will monitor as appropriate    # Hypoalbuminemia: Lowest albumin = 2.3 g/dL at 1/12/2024  5:49 AM, will monitor as appropriate  # Coagulation Defect: INR = 1.39 (Ref range: 0.85 - 1.15) and/or PTT = 29 Seconds (Ref range: 22 - 38 Seconds), will monitor for bleeding   # Acute Kidney Injury, unspecified: based on a >150% or 0.3 mg/dL increase in last creatinine compared to past 90 day average, will monitor renal function  # Hypertension: Noted on problem list                   Disposition Plan      Expected Discharge Date: 01/13/2024                    Olinda Farfan DO  Hospitalist Service  [facility]  Securely message with [employer] (more info)  Text page via [email or web address] Paging/Directory   ______________________________________________________________________    Interval History   No acute overnight events. Tired and somewhat confused. Ongoing pain related to cancer. No new symptoms. Family at bedside, seems somewhat improved to them, but still confused.     Physical Exam   Vital Signs: Temp: 98.1  F (36.7  C) Temp src: Oral BP: 109/66 Pulse: 85   Resp: 20 SpO2: 95 % O2 Device: None (Room air)    Weight: 167 lbs 12.32 oz    Constitutional: Awake, alert, no distress, and cooperative  Cardiovascular: Regular rate and rhythm, normal S1 and S2, no S3 or S4, and no murmur noted  Respiratory: No increased work of breathing,  good air exchange, clear to auscultation bilaterally, no crackles or wheezing  Gastrointestinal: Abdomen soft, diffusely tender, distended. BS normal. No masses, organomegaly     Medical Decision Making       45 MINUTES SPENT BY ME on the date of service doing chart review, history, exam, documentation & further activities per the note.      Data     I have personally reviewed the following data over the past 24 hrs:    41.6 (H)  \   8.1 (L)   / 333     135 102 21.1 /  103 (H)   4.5 20 (L) 1.16 (H) \     ALT: 43 AST: 150 (H) AP: 221 (H) TBILI: 4.8 (H)   ALB: 2.3 (L) TOT PROTEIN: 5.1 (L) LIPASE: N/A     INR:  1.39 (H) PTT:  N/A   D-dimer:  N/A Fibrinogen:  N/A

## 2024-01-12 NOTE — PROGRESS NOTES
"PALLIATIVE CARE PROGRESS NOTE  [facility]     Patient Name: Lani Walker  Date of Admission: 1/10/2024   Today the patient was seen for:   On going goals of care  Decisional and emotional support     Recommendations & Counseling     GOALS OF CARE:   Restorative without limits   \"At this time my mom is not ready to die and would want all treatment and interventions offered to her and would want resuscitation\"   was unable to meet on 1/12 due to his own medical appointment.  Plan to follow up with him and patient again on Monday     ADVANCE CARE PLANNING:  No health care directive on file. Per  informed consent policy, next of kin should be involved if patient becomes unable.  There is no POLST form on file, defer to patient and/or next of kin for decisions   Code status: Full Code     MEDICAL MANAGEMENT:   #Pain   Opioids: oxycodone (Roxicodone) IR   Acetaminophen (Tylenol), PRN- dose reduced due to liver functions  Repositioning as able     #Delirium  #Agitation  Avoid benzodiazepines, antihistamines, anticholinergics if able.  Lights on and blinds open during the day.  Reorient frequently.  Lights & TV off during the night.  Promote normal circadian rhythm.  Limit sensory deprivation - utilize hearing aids, glasses, etc.  Frequently assess basic needs such as temperature, elimination, thirst/hunger, pain  Consider bedside attendant (if able) for additional safety  Redirection as able     #Dyspnea  Complementary therapies  Repositioning for comfort  Monitor respiratory status closely     #General Weakness/Debility   Appreciate the help of staff for ADLs per plan of care  Appreciate the input of therapies for discharge recommendations  Family is interested in equipment for at home, hospital bed, OBT, commode  Family is looking into hiring help in the home     PSYCHOSOCIAL/SPIRITUAL SUPPORT:  Family   [place] community: [employer] would appreciate  for emotional " support    Palliative Care will continue to follow. Thank you for the consult and allowing us to aid in the care of Lani Walker.    These recommendations have been discussed with medical team.    Felicia Haywood NP  Securely message with [employer] (more info)  Text page via [facility] Paging/Directory      Assessments          Lani Walker is a 63 year old female with a past medical history of  metastatic pancreatic cancer-follows with Dr. Melara at [facility] who presented on 1/10/2024 with increase confusion and weakness.                 Interval History:     Per patient or family/caregivers today:  Met with patient and her best friend Sherri. Patient reports that she does not feel well today. Plan was to meet with spouse to talk about goals and he had to attend his own medical appointment. Plan will be to follow up on Monday again.             Review of Systems:     Besides above, an additional  system ROS was reviewed and is unremarkable               Physical Exam:   Temp: 98  F (36.7  C) Temp src: Oral Temp  Min: 97.8  F (36.6  C)  Max: 98  F (36.7  C) BP: 105/64 Pulse: 83   Resp: 20 SpO2: 95 % O2 Device: None (Room air)    Vital Signs with Ranges  Temp:  [97.8  F (36.6  C)-98  F (36.7  C)] 98  F (36.7  C)  Pulse:  [75-90] 83  Resp:  [18-20] 20  BP: ()/(51-71) 105/64  SpO2:  [95 %-96 %] 95 %  167 lbs 12.32 oz    EXAM:  Constitutional: no distress and sleeping between cares, slow to respond   Cardiovascular: negative  Respiratory: negative  Psychiatric: disoriented  Abdomen: Abdomen soft, non-tender. BS normal. No masses, organomegaly               Current Problem List:   Principal Problem:    Leukocytosis, unspecified type  Active Problems:    Hypercalcemia    Metabolic encephalopathy      No Known Allergies         Data Reviewed:       Results for orders placed or performed during the hospital encounter of 01/10/24 (from the past 24 hour(s))   Glucose by meter   Result Value Ref Range    GLUCOSE  BY METER POCT 100 (H) 70 - 99 mg/dL   Lactic acid whole blood   Result Value Ref Range    Lactic Acid 2.9 (H) 0.7 - 2.0 mmol/L   Symptomatic Influenza A/B, RSV, & SARS-CoV2 PCR (COVID-19) Nasopharyngeal    Specimen: Nasopharyngeal; Swab   Result Value Ref Range    Influenza A PCR Negative Negative    Influenza B PCR Negative Negative    RSV PCR Negative Negative    SARS CoV2 PCR Negative Negative    Narrative    Testing was performed using the Xpert Xpress CoV2/Flu/RSV Assay on the Cepheid GeneXpert Instrument. This test should be ordered for the detection of SARS-CoV-2, influenza, and RSV viruses in individuals who meet clinical and/or epidemiological criteria. Test performance is unknown in asymptomatic patients. This test is for in vitro diagnostic use under the FDA EUA for laboratories certified under CLIA to perform high or moderate complexity testing. This test has not been FDA cleared or approved. A negative result does not rule out the presence of PCR inhibitors in the specimen or target RNA in concentration below the limit of detection for the assay. If only one viral target is positive but coinfection with multiple targets is suspected, the sample should be re-tested with another FDA cleared, approved, or authorized test, if coinfection would change clinical management. This test was validated by the [facility]. These laboratories are certified under the Clinical Laboratory Improvement Amendments of 1988 (CLIA-88) as qualified to perform high complexity laboratory testing.   Glucose by meter   Result Value Ref Range    GLUCOSE BY METER POCT 124 (H) 70 - 99 mg/dL   Glucose by meter   Result Value Ref Range    GLUCOSE BY METER POCT 132 (H) 70 - 99 mg/dL   Glucose by meter   Result Value Ref Range    GLUCOSE BY METER POCT 122 (H) 70 - 99 mg/dL   Glucose by meter   Result Value Ref Range    GLUCOSE BY METER POCT 115 (H) 70 - 99 mg/dL   Comprehensive metabolic panel   Result Value Ref Range     Sodium 135 135 - 145 mmol/L    Potassium 4.5 3.4 - 5.3 mmol/L    Carbon Dioxide (CO2) 20 (L) 22 - 29 mmol/L    Anion Gap 13 7 - 15 mmol/L    Urea Nitrogen 21.1 8.0 - 23.0 mg/dL    Creatinine 1.16 (H) 0.51 - 0.95 mg/dL    GFR Estimate 53 (L) >60 mL/min/1.73m2    Calcium 11.1 (H) 8.8 - 10.2 mg/dL    Chloride 102 98 - 107 mmol/L    Glucose 103 (H) 70 - 99 mg/dL    Alkaline Phosphatase 221 (H) 40 - 150 U/L     (H) 0 - 45 U/L    ALT 43 0 - 50 U/L    Protein Total 5.1 (L) 6.4 - 8.3 g/dL    Albumin 2.3 (L) 3.5 - 5.2 g/dL    Bilirubin Total 4.8 (H) <=1.2 mg/dL   CBC with Platelets & Differential    Narrative    The following orders were created for panel order CBC with Platelets & Differential.  Procedure                               Abnormality         Status                     ---------                               -----------         ------                     CBC with platelets and d...[[id number]]  Abnormal            Final result               Manual Differential[[id number]]          Abnormal            Final result                 Please view results for these tests on the individual orders.   CBC with platelets and differential   Result Value Ref Range    WBC Count 41.6 (H) 4.0 - 11.0 10e3/uL    RBC Count 2.74 (L) 3.80 - 5.20 10e6/uL    Hemoglobin 8.1 (L) 11.7 - 15.7 g/dL    Hematocrit 25.4 (L) 35.0 - 47.0 %    MCV 93 78 - 100 fL    MCH 29.6 26.5 - 33.0 pg    MCHC 31.9 31.5 - 36.5 g/dL    RDW 23.9 (H) 10.0 - 15.0 %    Platelet Count 334 150 - 450 10e3/uL    % Neutrophils      % Lymphocytes      % Monocytes      % Eosinophils      % Basophils      % Immature Granulocytes      NRBCs per 100 WBC 1 (H) <1 /100    Absolute Neutrophils      Absolute Lymphocytes      Absolute Monocytes      Absolute Eosinophils      Absolute Basophils      Absolute Immature Granulocytes      Absolute NRBCs 0.2 10e3/uL   Manual Differential   Result Value Ref Range    % Neutrophils 84 %    % Lymphocytes 7 %    % Monocytes 9 %     % Eosinophils 0 %    % Basophils 0 %    Absolute Neutrophils 34.9 (H) 1.6 - 8.3 10e3/uL    Absolute Lymphocytes 2.9 0.8 - 5.3 10e3/uL    Absolute Monocytes 3.7 (H) 0.0 - 1.3 10e3/uL    Absolute Eosinophils 0.0 0.0 - 0.7 10e3/uL    Absolute Basophils 0.0 0.0 - 0.2 10e3/uL    RBC Morphology Confirmed RBC Indices     Platelet Assessment  Automated Count Confirmed. Platelet morphology is normal.     Automated Count Confirmed. Platelet morphology is normal.    Kye Cells Slight (A) None Seen    Target Cells Slight (A) None Seen   Platelet count   Result Value Ref Range    Platelet Count 333 150 - 450 10e3/uL   INR   Result Value Ref Range    INR 1.39 (H) 0.85 - 1.15          Medical Decision Making       38 MINUTES SPENT BY ME on the date of service doing chart review, history, exam, documentation & further activities per the note.

## 2024-01-12 NOTE — CONSULTS
GASTROENTEROLOGY CONSULTATION      Lani Walker  [address] [id number]  63 year old female     Admission Date/Time: 1/10/2024  Primary Care Provider: Frnacine Lombardi  Referring / Attending Physician: Dr. Melara     We were asked to see the patient in consultation by Dr. Melara for evaluation of pancreatic cancer with possible biliary obstruction.        HPI:  Lani Walker is a 63 year old female with metastatic pancreatic cancer (followed by [place] [facility]) who came to the emergency room with significant confusion and generalized weakness which began about 1 day prior to admission.  Gastroenterology was consulted by oncology to evaluate for biliary obstruction and possible cholangitis.    Patient is a difficult historian.  She does have family present who provide additional collateral information.  Per medical record, patient was diagnosed with metastatic pancreatic cancer in June 2022.  She was treated initially with chemotherapy but unfortunately has relapsed.  She does have progression of her disease on CT imaging dated 1/3/2024. In the emergency room she was found to have significant elevated calcium.  Her white count is elevated at 46 and her bilirubin is 4.8.  , ALT 43, alkaline phosphatase 221.  Creatinine 1.16 this morning.    Per patient and family she has had very little appetite.  There is not been nausea or vomiting.  No reported fever.  She has been using narcotic pain medication for left upper quadrant pain.    A CT scan of the chest abdomen pelvis was completed as an outpatient 1/3/2024.  This revealed innumerable new hepatic metastases, increased size of left pancreatic tail mass with increased invasion of adjacent structures.  There are newly enlarged thoracic lymph nodes and new right lung nodule.  A abdominal ultrasound completed 1/10/2024 did not reveal any intrahepatic biliary ductal dilation.  The common bile duct was not well-visualized.  In review  of her medical record, does not appear that she has ever undergone pancreatic or biliary stenting.       PAST MEDICAL HISTORY:  Patient Active Problem List    Diagnosis Date Noted    Hypercalcemia 01/10/2024     Priority: Medium    Metabolic encephalopathy 01/10/2024     Priority: Medium    Leukocytosis, unspecified type 01/10/2024     Priority: Medium    Mild episode of recurrent major depressive disorder (H24) 11/18/2021     Priority: Medium    Hypercholesteremia 01/24/2018     Priority: Medium    Essential hypertension, benign 01/09/2018     Priority: Medium    CARDIOVASCULAR SCREENING; LDL GOAL LESS THAN 130 01/09/2018     Priority: Medium          ROS: A comprehensive ten point review of systems was negative aside from those in mentioned in the HPI.       MEDICATIONS:   Prior to Admission medications    Medication Sig Start Date End Date Taking? Authorizing Provider   albuterol (PROAIR HFA/PROVENTIL HFA/VENTOLIN HFA) 108 (90 Base) MCG/ACT inhaler INHALE 2 PUFFS BY MOUTH EVERY 6 HOURS AS NEEDED FOR SHORTNESS OF BREATH OR DIFFICULT BREATHING OR WHEEZING 11/29/22  Yes Francine Lombardi APRN CNP   FLUoxetine (PROZAC) 10 MG capsule Take 20 mg by mouth every morning   Yes Unknown, Entered By History   FLUoxetine (PROZAC) 10 MG capsule Take 10 mg by mouth every evening In addition to 20 mg in the morning.   Yes Unknown, Entered By History   insulin glargine (LANTUS PEN) 100 UNIT/ML pen Inject 12-18 Units Subcutaneous at bedtime 10/24/23  Yes Alicia Munguia MD   LORazepam (ATIVAN) 0.5 MG tablet Take 0.5 mg by mouth every 6 hours as needed for anxiety   Yes Unknown, Entered By History   OLANZapine (ZYPREXA) 2.5 MG tablet Take 2.5 mg by mouth See Admin Instructions TAKE 1 TABLET BY MOUTH ONCE DAILY AT BEDTIME ON DAYS 1-4 OF CHEMO   Yes Unknown, Entered By History   oxyCODONE (ROXICODONE) 5 MG tablet Take 5 mg by mouth every 6 hours as needed for severe pain   Yes Unknown, Entered By History   prochlorperazine  (COMPAZINE) 10 MG tablet Take 10 mg by mouth every 6 hours as needed for nausea or vomiting   Yes Unknown, Entered By History   blood glucose (NO BRAND SPECIFIED) test strip Use to test blood sugar 2 times daily (before breakfast and before evening meal) or as directed. To accompany: Blood Glucose Monitor Brands: per insurance. 10/24/23   Alicia Munguia MD   Blood Glucose Monitoring Suppl (ACCU-CHEK GUIDE ME) w/Device KIT USE TO TEST BLOOD SUGAR TWO TIMES DAILY (BEFORE BREAKFAST AND BEFORE EVENING MEAL) 11/21/23   Alicia Munguia MD   Continuous Blood Gluc Sensor (FREESTYLE AMI 3 SENSOR) MISC 1 Device every 14 days 11/9/23   Francine Lombardi APRN CNP   insulin pen needle (31G X 8 MM) 31G X 8 MM miscellaneous Use 1 pen needles daily or as directed. 10/24/23   Alicia Munguia MD   thin (NO BRAND SPECIFIED) lancets Use with lanceting device to test blood sugars 2 times daily (before breakfast and before evening meal) . To accompany: Blood Glucose Monitor Brands: per insurance. 10/24/23   Alicia Munguia MD        ALLERGIES: No Known Allergies     SOCIAL HISTORY:  Social History     Tobacco Use    Smoking status: Every Day    Smokeless tobacco: Never    Tobacco comments:     10 cigs per day   Vaping Use    Vaping Use: Never used   Substance Use Topics    Alcohol use: Yes     Comment: once weekly 4-5 drinks     Drug use: No        FAMILY HISTORY:  Family History   Problem Relation Age of Onset    Alzheimer Disease Mother     Heart Surgery Father         stent    Lung Cancer Father 88        non-smokerr    Diabetes Maternal Grandfather     Breast Cancer Maternal Aunt     Ovarian Cancer Maternal Aunt     Alzheimer Disease Maternal Aunt     Alzheimer Disease Maternal Aunt     Stomach Cancer Maternal Aunt     Asthma No family hx of     Thyroid Disease No family hx of     Colon Cancer No family hx of         PHYSICAL EXAM:     /64 (BP Location: Left arm)   Pulse 83   Temp 98  F (36.7  C) (Oral)    Resp 20   Wt 76.1 kg (167 lb 12.3 oz)   LMP  (LMP Unknown)   SpO2 95%   BMI 26.67 kg/m       PHYSICAL EXAM:  GENERAL: Chronically ill-appearing, pale  SKIN: no suspicious lesions, rashes  HEAD: Normocephalic. Atraumatic.  NECK: Neck supple. No adenopathy.   EYES: No scleral icterus  GASTROINTESTINAL: soft, epigastric and left upper quadrant tenderness with diffuse tenderness throughout abdomen, non distended, no guarding/rebound  JOINT/EXTREMITIES:  no gross deformities noted, normal muscle tone  NEURO: CN 2-12 grossly intact, no focal deficits  PSYCH: Normal affect        ADDITIONAL COMMENTS:   I reviewed the patient's new clinical lab test results.     Recent Labs   Lab 01/12/24  0937 01/12/24  0549 01/11/24  0638 01/10/24  1939   WBC  --  41.6* 46.8* 40.4*   RBC  --  2.74* 2.75* 3.12*   HGB  --  8.1* 8.1* 9.2*   HCT  --  25.4* 25.0* 28.4*   MCV  --  93 91 91   MCH  --  29.6 29.5 29.5   MCHC  --  31.9 32.4 32.4   RDW  --  23.9* 22.4* 22.0*    334 356 416     Recent Labs   Lab Test 01/12/24  0549 01/11/24  0638 01/10/24  1920   POTASSIUM 4.5 4.8 4.5   CHLORIDE 102 100 91*   CO2 20* 17* 23   BUN 21.1 14.2 12.7   ANIONGAP 13 14 16*     Recent Labs   Lab Test 01/12/24  0549 01/11/24  0638 01/10/24  2311 01/10/24  1920 06/02/22  1012 03/02/22  1221   ALBUMIN 2.3* 2.4*  --  2.9*   < >  --    BILITOTAL 4.8* 4.8*  --  4.4*   < >  --    ALT 43 37  --  46   < >  --    * 148*  --  136*   < >  --    PROTEIN  --   --  30*  --   --  Negative   LIPASE  --   --   --  455*  --   --     < > = values in this interval not displayed.       Recent Labs   Lab 01/10/24  1920   INR 1.30*     Recent Labs   Lab 01/10/24  1920   LIPASE 455*           CONSULTATION ASSESSMENT AND PLAN:    Lani Walker is a 63 year old female with metastatic pancreatic cancer (followed by [place] [facility]) who came to the emergency room with significant confusion and generalized weakness which began about 1 day prior to admission.   Gastroenterology was consulted by oncology to evaluate for biliary obstruction and possible cholangitis.    1.  Suspected biliary obstruction/cholangitis: Given rising white count and bilirubin, concern for cholangitis.  She is being covered on empiric antibiotics-Zosyn.  Recent CT scan from January 3 does show significant progression of her disease with enlarging pancreatic tail mass and multiple new liver mets.  It does not appear that she has had any pancreatic or biliary stenting in the past.  She is afebrile.     -- Follow LFTs, CBC, INR  -- Blood culture x 2 no growth after 1 day, urine culture no growth.  -- Plan for ERCP this afternoon here at [facility] at 4 PM.   -- Ongoing management by oncology.    I discussed the patient plan with Nelsy Oh and Adebayo, GI staff physician. Thank you for asking us to participate in the care of this patient.     70 min of total time was spent providing patient care, including patient evaluation, reviewing documentation/ test results, and .     Tiffanie Resendiz PA-C  [facility] ( [facility])

## 2024-01-12 NOTE — CONSULTS
"CLINICAL NUTRITION SERVICES  -  ASSESSMENT NOTE    RECOMMENDATIONS FOR MD/PROVIDER TO ORDER: advance diet as medically appropriate    Recommendations Ordered by Registered Dietitian (RD): entered orders for pt to have ability to order Ensure when she is off NPO   Additional Recommendations:   Encourage oral intake when able  Order and encourage pt to drink Ensure in order to meet calorie/protein needs   MALNUTRITION:  % Weight Loss:  unable to assess d/t fluid masking, will continue to monitor   % Intake:  unable to assess at this time  Subcutaneous Fat Loss:  unable to assess at this time  Muscle Loss:  unable to assess at this time  Fluid Retention:  ascites, no edema noted     Malnutrition Diagnosis: unable to assess at this time, will complete malnutrition assessment at a more appropriate time.         REASON FOR ASSESSMENT  Lani Walker is a 63 year old female seen by Registered Dietitian for Provider Order - Malnutrition consult. Pt presented to the ED on 1/9 for generalized weakness and hypercalcemia.       PMH: currently in cancer treatments for metastatic pancreatic cancer (last received chemo 12/25), HTN, HLD, depression, pancreatic insufficiency (takes insulin)       NUTRITION HISTORY  Information obtained from patient and family. Family requested that RDN comes back at a different time for assessment. Will follow up with pt and family when able. Family did note that pt drinks Ensure at home. RDN entered orders for pt to have ability to order ensures when she is allowed to eat.     MD notes pt is cachectic, oriented to person.     Per care everywhere, a note from 11/16/23 with an RD notes pt eats ~2 small meals  per day and pt had \"cut out all sweets\". Pt had lost wt unintentionally from making dietary changes. It is not stated how much wt was lost or UBW.       CURRENT NUTRITION ORDERS  Diet Order:  currently NPO      Current Intake/Tolerance: Per health touch, pt ordered 2 meals yesterday; no " intake is documented in flowsheets.     Food Allergies/Intolerances: NKFA    Labs: reviewed; calcium 11.0 (H), trending down    Medications: insulin, calcitonin, zosyn, zofran, senokot, zometa, sodium chloride infusion     Skin: ascites noted, no edema noted     I/Os: LBM noted 1/11, +1.5L since admission       ANTHROPOMETRICS  Height: 168.9 cm   Weight: 167 lbs 12.32 oz  Body mass index is 26.67 kg/m .  Weight History: Per chart review, pt has lost 5.2% of wt since October 2023 (3 months). This is not clinically significant given fluid status and noted recent dietary changes. Will continue to monitor wt.   Wt Readings from Last 10 Encounters:   01/11/24 76.1 kg (167 lb 12.3 oz)   10/23/23 80.3 kg (177 lb)   06/02/22 76.6 kg (168 lb 12.8 oz)   05/23/22 78.7 kg (173 lb 9.6 oz)   05/20/22 80.7 kg (178 lb)   04/27/22 80.7 kg (178 lb)   04/21/22 79.4 kg (175 lb)   03/02/22 82.6 kg (182 lb 1.6 oz)   11/18/21 80.7 kg (178 lb)   06/30/21 80.7 kg (178 lb)          ASSESSED NUTRITION NEEDS PER APPROVED PRACTICE GUIDELINES:    Dosing Weight 76.1 kg (current wt)   Estimated Energy Needs: 9754-1893 kcals (30-35 Kcal/Kg)  Justification: maintenance and hypercatabolism with cancer   Estimated Protein Needs:  grams protein (1.2-1.5 g pro/Kg)  Justification: maintenance and hypercatabolism with critical illness  Estimated Fluid Needs: per MD or 1 mL/kcal      NUTRITION DIAGNOSIS:  Predicted inadequate nutrient intake related to fatigue and recent cancer treatments as evidenced by previous RDN notes, health touch records, and pt's clinical status.       NUTRITION INTERVENTIONS  Recommendations / Nutrition Prescription  See above.       Implementation  Medical Food Supplement and Multivitamin/Mineral      Nutrition Goals  Consume % of 2-3 meals daily when able. (Pending GOC)  Consume 1-2 Ensures daily to support intake (when able). (Pending GOC)      MONITORING AND EVALUATION:  Progress towards goals will be monitored and  evaluated per protocol and Practice Guidelines      Jessy Fraga  Clinical Dietitian  3rd floor/ICU: [phone]  All other floors: [phone]  Weekend/holiday: [phone]  Office: [phone]

## 2024-01-12 NOTE — PLAN OF CARE
End of Shift Summary  For vital signs and complete assessments, please see documentation flowsheets.     Pertinent assessments: lethargic and confused, oriented to self, alarms on for safety, VSS, afebrile, room air, bowel sounds hypo, skin dry/flaky--moisturizer applied, PORT infusing, denies pain/nausea. Patient transferred to bathroom with assist of 2 and walker/gait belt, but was unable to walk back so used the [employer]     Major Shift Events: None    Treatment Plan: oncology/palliative and GI consults, symptom management    Bedside Nurse: Lesly Goetz RN

## 2024-01-13 PROBLEM — N17.9 ACUTE KIDNEY FAILURE, UNSPECIFIED (H): Status: ACTIVE | Noted: 2024-01-01

## 2024-01-13 NOTE — PLAN OF CARE
Goal Outcome Evaluation:                      To Do:  End of Shift Summary  For vital signs and complete assessments, please see documentation flowsheets.     Pertinent assessments: Increasingly lethargic  confused words and conversation ---   poor po intake ---  poor pivot to commode assist of 3 belt and walker lift  equipment for future use --  denies pain and nausea -- family at bedside   Major Shift Events MRCP  Treatment Plan:  monitor   Bedside Nurse: Ene Gonzalez RN

## 2024-01-13 NOTE — PROGRESS NOTES
Oncology/Hematology Follow Up Note:    Assessment and Plan:  #1 Metastatic pancreatic cancer  - Disease recently progressed on FOLFIRINOX and Gemcitabine/Abraxane  - No standard treatment option left  - Blood sample sent for NGS testing.  Also scheduled for liver biopsy next week to send fresh sample for NGS testing, to see if there is any targetable mutation or if her disease could be treated with immunotherapy, but it is unlikely we will find anything that would open up new treatment options.  - Patient's functional status has declined rapidly over the past month, especially over the past few days     PLAN:  - Ongoing goals of care discussion with the patient and her family.  Lani is less confused due to correction of hypercalcemia, but she is still very lethargic.  I think the rapid decline over the past few days is not just due to disease progression.  With elevated WBC and hyperbilirubinemia, along with RUQ abdominal pain, I think she also has cholangitis which could be potentially reversible   - I did discuss with Lani and her family that unless we find something reversible and that her functional status improves, she would not be a candidate to receive any further systemic therapy even if we find something actionable on NGS testing.     #2 Hypercalcemia of malignancy  - Calcitonin x 2 and IVF  - I gave her Zometa on 1/11  - Ca down to 11.1 today  - Less confused today but not back to baseline.     PLAN:  - Continue IVF.  OK to give more calcitonin if needed     #3 Leukocytosis  #4 Hyperbilirubinemia  - Bilirubin was normal 2 weeks ago.  WBC was up to 46K yesterday  - Has RUQ tenderness  - Presentation concerning for cholangitis  - Seen by GI.  Initially planned for ERCP at 4pm today but this was cancelled because GI staff who saw her later today did not think Lani would benefit from ERCP  - On Zosyn  - Bilirubin stable at 4.8 today.  WBC stable at 41K  - Blood and urine cultures negative so far.  - U/S  showed no intrahepatic biliary ductal dilatation.  Common bile duct not very well visualized.  Gallbladder contains sludge     PLAN:  - Cannot rule out cholangitis with rapidly rising WBC and bilirubin, in the setting of RUQ abdominal pain, but rising bilirubin could also be related to disease progression in the liver  - GI cancelled ERCP.  I tried to reach out to GI, but was unsuccessful.  - Continue Zosyn  - Discussed situation with hospitalist.  Will recheck LFTs tomorrow and consider MRCP to see if there is anything stentable.  - I also spoke to my colleague  Dr. Monet who will see her this weekend.     #5 Goals of care  - Will continue to address with patient and her family.  Hopefully she can be more involved in decision making once her mental status clears up more with correction of hypercalcemia.    - I am concerned her function status has declined rapidly over the past month.  A month ago, she was still doing relatively well and still traveling.  She was able to come to clinic to see me on Monday and have lunch with her daughter.  Now she requires 2 person assist to get out of bed, and is very lethargic.  If the rapid decline is due to progression of cancer instead of something more reversible, she would not benefit from any further treatment of her metastatic pancreatic cancer  - Had a long discussion about her code status with Lani and her family and friends today.  Lani is now more open to DNR/DNI, but per Dr. Farfan's discussion later this afternoon, Lani's family has decided to keep her FULL CODE for now.     Pramod Melara M.D.  [facility]  [phone]    Subjective:    Lani's mental status is more clear this morning but she is still very lethargic.  No nausea.  Complains of epigatric and RUQ abdominal pain.  No fevers or chills.  She was been started on empiric antibiotics.      Labs:  All labs reviewed    CBCRecent Labs   Lab 01/12/24  0937 01/12/24  0549 01/11/24  0638 01/10/24  1939  "  WBC  --  41.6* 46.8* 40.4*   HGB  --  8.1* 8.1* 9.2*   MCV  --  93 91 91    334 356 416       CMP  Recent Labs   Lab 01/12/24  0549 01/12/24  0447 01/12/24  0018 01/11/24 2011 01/11/24  0811 01/11/24  0638 01/11/24  0027 01/10/24  1920     --   --   --   --  131*  --  130*   POTASSIUM 4.5  --   --   --   --  4.8  --  4.5   CHLORIDE 102  --   --   --   --  100  --  91*   CO2 20*  --   --   --   --  17*  --  23   ANIONGAP 13  --   --   --   --  14  --  16*   * 115* 122* 132*   < > 91   < > 91   BUN 21.1  --   --   --   --  14.2  --  12.7   CR 1.16*  --   --   --   --  0.68  --  0.54   GFRESTIMATED 53*  --   --   --   --  >90  --  >90   SKINNY 11.1*  --   --   --   --  12.0*  --  13.9*   PROTTOTAL 5.1*  --   --   --   --  5.1*  --  6.0*   ALBUMIN 2.3*  --   --   --   --  2.4*  --  2.9*   BILITOTAL 4.8*  --   --   --   --  4.8*  --  4.4*   ALKPHOS 221*  --   --   --   --  240*  --  296*   *  --   --   --   --  148*  --  136*   ALT 43  --   --   --   --  37  --  46    < > = values in this interval not displayed.       INR  Recent Labs   Lab 01/12/24  0937 01/10/24  1920   INR 1.39* 1.30*       Blood CultureNo results for input(s): \"CULT\" in the last 168 hours.      Pramod Melara MD  [facility]  1/12/2024 6:43 PM        "

## 2024-01-13 NOTE — PROGRESS NOTES
Oncology/Hematology Follow Up Note:    Assessment and Plan:  #1 Metastatic pancreatic cancer  - Disease recently progressed on FOLFIRINOX and Gemcitabine/Abraxane  - No standard treatment option left  - Blood sample sent for NGS testing.  Also scheduled for liver biopsy next week to send fresh sample for NGS testing, to see if there is any targetable mutation or if her disease could be treated with immunotherapy, but it is unlikely we will find anything that would open up new treatment options.  - Patient's functional status has declined rapidly over the past month, especially over the past few days     PLAN:  - Ongoing goals of care discussion with the patient and her family.  Lani is less confused due to correction of hypercalcemia, but she is still very lethargic. Rapid decline over the past few days is not just due to disease progression.  With elevated WBC and hyperbilirubinemia, along with RUQ abdominal pain, - d/d  cholangitis which could be potentially reversible   - Per Dr Melara- discussed with Lani and her family that unless we find something reversible and that her functional status improves, she would not be a candidate to receive any further systemic therapy even if we find something actionable on NGS testing.     #2 Hypercalcemia of malignancy  - Calcitonin x 2 and IVF  - Zometa on 1/11  - Ca down to 10.7 today  - Less confused today but not back to baseline.     PLAN:  - Continue IVF.  OK to give more calcitonin if needed     #3 Leukocytosis  #4 Hyperbilirubinemia  - Bilirubin was normal 2 weeks ago.  WBC stilll up and rising- 42.8 K   - Has RUQ tenderness  - Presentation concerning for cholangitis  - Seen by GI.  Initially planned for ERCP at 4pm 1/12, but this was cancelled because GI staff who saw her laterdid not think Lani would benefit from ERCP  - On Zosyn  - Bilirubin increased to 5.2 today.  - Blood and urine cultures negative so far.  - U/S showed no intrahepatic biliary ductal  dilatation.  Common bile duct not very well visualized.  Gallbladder contains sludge     PLAN:  - Cannot rule out cholangitis with rapidly rising WBC and bilirubin, in the setting of RUQ abdominal pain, but rising bilirubin could also be related to disease progression in the liver  - ERCP cancelled by [facility]    MRCP today  - Continue Zosyn  - Discussed situation with hospitalist.    ? Leukoerythroblastic picture from metastatic cancer      #5 Goals of care  - Will continue to address with patient and her family.  Hopefully she can be more involved in decision making once her mental status clears up more with correction of hypercalcemia.    - Per Dr Lovelace- Concerned her function status has declined rapidly over the past month.  A month ago, she was still doing relatively well and still traveling.  She was able to come to clinic to see me on Monday and have lunch with her daughter.  Now she requires 2 person assist to get out of bed, and is very lethargic.  If the rapid decline is due to progression of cancer instead of something more reversible, she would not benefit from any further treatment of her metastatic pancreatic cancer  - Per Dr Melara-long discussion about her code status with Lani and her family and friends 1/12.  Lani is now more open to DNR/DNI, Lani's family has decided to keep her FULL CODE for now.  Guarded prognosis    # 6- ARF  Creat rising now  - ? Some impact from zometa  - continue IVF  - may need nephrology consult  - - Caution with iv iodine      Plan d/w Dr Farfan. We will follow pt along with you    TT 40 mins, > 50 % in direct face to face with pt,. .Additional time chart review,. Documentation, care cordination        Shirin Monet MD   [facility]  [phone]    Subjective:    Lani's mental status is slightly more clear this morning but she is still very lethargic and slightly confused. Per family in room, very off from her baseline still.   No nausea.  Complains of  "epigatric and RUQ abdominal pain.  No fevers or chills.        Labs:  All labs reviewed    CBC  Recent Labs   Lab 01/13/24  0535 01/12/24  0937 01/12/24  0549 01/11/24 0638   WBC 42.8*  --  41.6* 46.8*   HGB 8.4*  --  8.1* 8.1*   MCV 96  --  93 91     306 333 334 356       CMP  Recent Labs   Lab 01/13/24  0535 01/12/24  0549 01/12/24  0447 01/12/24  0018 01/11/24  0811 01/11/24  0638 01/11/24  0027 01/10/24  1920    135  --   --   --  131*  --  130*   POTASSIUM 4.6 4.5  --   --   --  4.8  --  4.5   CHLORIDE 104 102  --   --   --  100  --  91*   CO2 17* 20*  --   --   --  17*  --  23   ANIONGAP 15 13  --   --   --  14  --  16*   * 103* 115* 122*   < > 91   < > 91   BUN 29.8* 21.1  --   --   --  14.2  --  12.7   CR 1.47*  1.50* 1.16*  --   --   --  0.68  --  0.54   GFRESTIMATED 40*  39* 53*  --   --   --  >90  --  >90   SIKNNY 10.7* 11.1*  --   --   --  12.0*  --  13.9*   PROTTOTAL 5.2* 5.1*  --   --   --  5.1*  --  6.0*   ALBUMIN 2.4* 2.3*  --   --   --  2.4*  --  2.9*   BILITOTAL 5.2* 4.8*  --   --   --  4.8*  --  4.4*   ALKPHOS 214* 221*  --   --   --  240*  --  296*   * 150*  --   --   --  148*  --  136*   ALT 48 43  --   --   --  37  --  46    < > = values in this interval not displayed.       INR  Recent Labs   Lab 01/12/24  0937 01/10/24  1920   INR 1.39* 1.30*       Blood CultureNo results for input(s): \"CULT\" in the last 168 hours.      Shirin Monet MD  [facility]  1/12/2024 6:43 PM        "

## 2024-01-13 NOTE — PLAN OF CARE
Goal Outcome Evaluation:             To Do:  End of Shift Summary  For vital signs and complete assessments, please see documentation flowsheets.     Pertinent assessments: Alert when aroused --- lethargic and sleepy-- confused saying illogical words--  up to bathroom with assist of 2 belt and walker --- voiding without problems --- denies pain and nausea ---- poor appetite----   Major Shift Events family at bedside  Treatment Plan:  monitor  Bedside Nurse: Ene Gonzalez RN

## 2024-01-13 NOTE — PLAN OF CARE
Goal Outcome Evaluation:      Plan of Care Reviewed With: patient    Overall Patient Progress: no changeOverall Patient Progress: no change     Shift: 7pm-7am    Vitals: Temp: 97.7  F (36.5  C) Temp src: Oral BP: 116/74 Pulse: 81   Resp: 20 SpO2: 96 % O2 Device: None (Room air)       Orientation: alert with some confusion  Pain: denies  Activity: assist of 2 w/ gb and walker   Resp: clear on RA  Diet: regular diet  How to take meds: whole w/ fluids    GI & : incontinent of urine, no bm this shift   Skin: ACHS  Lines: port accessed - infusing normal saline @100ml/hr

## 2024-01-13 NOTE — PROGRESS NOTES
[facility]    Medicine Progress Note - Hospitalist Service    Date of Admission:  1/10/2024    Assessment & Plan   Summary: Lani Walker is a 63 year old female with PMHx of metastatic pancreatic cancer (contemplating 3rd line therapy under management of Dr. Melara), who was admitted on 1/10/2024 with confusion and weakness, FTH hypercalcemia.     Acute metabolic encephalopathy, likely multifactorial  Hypercalcemia   Leukocytosis   Presenting with 1 day of confusion and weakness. Workup notable for a calcium of 13.9 (ionized 7.8), WBC of 40.4 with more than 80% neutrophils, no acute pathology on CT head.   Received IVF and calcitonin in ER. Mental status had improvement after IVF. Ca responding to calcitonin (13.9->12->11.1->10.7).  WBC has been rising based on review of labs from [facility] with most recent count 23.2 on 1/8/24. Unclear etiology, but possible infection must be considered. UA negative, COVID/flu/RSV negative, US abdomen unrevealing. Patient did report some increased dyspnea. Had recent CT CAP 1/3/24 that did not show specific cause of dyspnea, but does document marked interval progression of disease. TTE was hyperdynamic but also did not reveal cause of dyspnea.  Given elevated WBC and bilirubin, H/O consulted GI for possible cholangitis. GI felt ERCP is unlikely to benefit patient as there is no ductal dilation and could potentially worsen the case as bacteria would be injected up into the bile duct.  Patient remains confused and is becoming weaker, despite improvement in calcium. Per her oncology, she had new RUQ pain relative to a week ago. WBC and bilirubin continue to rise. Concern that we do not have source control for an underlying infection. Will order MRCP to evaluate. If negative, will consider repeat CT CAP.   - generous fluid support with NS  - s/p Calcitonin and Zoledronic acid   - follow up blood cultures  - empiric zosyn  - MRCP    STEPHANIE   Cr now at 1.5.  "unclear etiology. Has been getting IVF so unlikely hypovolemia. May be related to Zometa. Still having good urine output. If worsening, consider nephrology consult.  - avoid nephrotoxins  - AM BMP    Metastatic pancreatic adenocarcinoma    Follows with Dr. Melara. Initially did well with FOLFOX, but subsequently recurred. Most recently on gemcitabine and abraxane as of 12/26/23.   - Oncology following    Pancreatic insufficiency  PTA on 12-18U lantus. Has not been eating much recently. BG in 100s.  - sliding scale insulin    Goals of care  Had discussion with daughter and . Patient too tired/altered to have meaningful discussion with this provider. Recommended DNR/DNI in setting of likely untreatable cancer. Daughter seems to understand and agrees, but  seems to overwhelmed to be able to make a decision about this. He wants more time to think about it.  - Palliative care following        Diet: Snacks/Supplements Adult: Ensure Enlive; With Meals  Regular Diet Adult    DVT Prophylaxis: Enoxaparin (Lovenox) SQ  Malin Catheter: Not present  Lines: PRESENT      Port a Cath 06/15/22 Single Lumen Right Chest wall-Site Assessment: WDL      Cardiac Monitoring: None  Code Status: Full Code      Clinically Significant Risk Factors           # Hypercalcemia: Highest Ca = 11.1 mg/dL in last 2 days, will monitor as appropriate    # Hypoalbuminemia: Lowest albumin = 2.3 g/dL at 1/12/2024  5:49 AM, will monitor as appropriate  # Coagulation Defect: INR = 1.39 (Ref range: 0.85 - 1.15) and/or PTT = 29 Seconds (Ref range: 22 - 38 Seconds), will monitor for bleeding   # Acute Kidney Injury, unspecified: based on a >150% or 0.3 mg/dL increase in last creatinine compared to past 90 day average, will monitor renal function  # Hypertension: Noted on problem list        # Overweight: Estimated body mass index is 28.15 kg/m  as calculated from the following:    Height as of this encounter: 1.676 m (5' 6\").    Weight as of this " encounter: 79.1 kg (174 lb 6.1 oz)., PRESENT ON ADMISSION            Disposition Plan     Expected Discharge Date: 01/13/2024                    Olinda Farfan, DO  Hospitalist Service  [facility]  Securely message with Carla (more info)  Text page via [email or web address] Paging/Directory   ______________________________________________________________________    Interval History   No acute overnight events.  Tired today and slow to answer orientation questions. Per nursing is requiring a lift now for transfers. A week ago she was walking into clinic    Physical Exam   Vital Signs: Temp: 97.7  F (36.5  C) Temp src: Oral BP: 122/81 Pulse: 82   Resp: 20 SpO2: 97 % O2 Device: None (Room air)    Weight: 174 lbs 6.14 oz    Constitutional: Awake, appears tired, no distress, and cooperative  Cardiovascular: Regular rate and rhythm, normal S1 and S2, no S3 or S4, and no murmur noted  Respiratory: No increased work of breathing, good air exchange, clear to auscultation bilaterally, no crackles or wheezing  Gastrointestinal: Abdomen soft, R sided tenderness, distended. BS normal. No masses, organomegaly     Medical Decision Making       45 MINUTES SPENT BY ME on the date of service doing chart review, history, exam, documentation & further activities per the note.      Data     I have personally reviewed the following data over the past 24 hrs:    42.8 (H)  \   8.4 (L)   / 306; 306     136 104 29.8 (H) /  126 (H)   4.6 17 (L) 1.50 (H); 1.47 (H) \     ALT: 48 AST: 169 (H) AP: 214 (H) TBILI: 5.2 (H)   ALB: 2.4 (L) TOT PROTEIN: 5.2 (L) LIPASE: N/A

## 2024-01-14 NOTE — PROGRESS NOTES
"   01/14/24 9963   Appointment Info   Signing Clinician's Name / Credentials (PT) Vasu Smith DPT   Living Environment   People in Home spouse   Current Living Arrangements house   Home Accessibility stairs to enter home;stairs within home   Number of Stairs, Main Entrance 1   Stair Railings, Main Entrance none   Number of Stairs, Within Home, Primary five   Stair Railings, Within Home, Primary railings on both sides of stairs   Transportation Anticipated family or friend will provide   Living Environment Comments Pt lives in house with spouse. 1 ESTHER front door, and split level, 5 stairs up/down, B railing. Pt has has walk-in shower.   Self-Care   Usual Activity Tolerance good   Current Activity Tolerance fair   Equipment Currently Used at Home none   Fall history within last six months no   Activity/Exercise/Self-Care Comment Pt previously IND with functional mobility. No AD use for gait. No equipment owned at home.   General Information   Onset of Illness/Injury or Date of Surgery 01/10/24   Referring Physician Olinda Farfan,    Patient/Family Therapy Goals Statement (PT) did not state.   Pertinent History of Current Problem (include personal factors and/or comorbidities that impact the POC) Pt is 62 yo female who, per chart, \"MHx of metastatic pancreatic cancer (contemplating 3rd line therapy under management of Dr. Melara), who was admitted on 1/10/2024 with confusion and weakness, FTH hypercalcemia. Now with worsening lactcic acidosis and leukocytosis pending further infectious work up, nephrology consulted for worsening STEPHANIE. Pending ongoing clinical improvement. \"   Existing Precautions/Restrictions fall   Cognition   Affect/Mental Status (Cognition) confused   Cognitive Status Comments did not formally assess orientation. Pt demonstrates confusion, contradicting statements throughout with mobility.   Integumentary/Edema   Integumentary/Edema Comments 2+ edema noted in B feet and ankles. "   Posture    Posture Forward head position   Range of Motion (ROM)   Range of Motion ROM is WFL   Strength (Manual Muscle Testing)   Strength (Manual Muscle Testing) Deficits observed during functional mobility   Strength Comments difficult to assess given cognition deficits, pt with poor command following at times.   Bed Mobility   Bed Mobility supine-sit   Comment, (Bed Mobility) minAx2 from elevated HOB.   Transfers   Transfers sit-stand transfer   Comment, (Transfers) Neeta Stedy and minAx2, from elevated bed height.   Gait/Stairs (Locomotion)   Comment, (Gait/Stairs) did not ambulate on this date.   Balance   Balance Comments sitting good at EOB, standing limited assessment given pts confusion and quickly requesting to return to sitting.   Sensory Examination   Sensory Perception Comments difficult to assess, appears intact at distal B UEs and LEs with light touch screen.   Clinical Impression   Criteria for Skilled Therapeutic Intervention Yes, treatment indicated   PT Diagnosis (PT) impaired functional mobility   Influenced by the following impairments decreased strength and activity tolerance, confusion.   Functional limitations due to impairments difficulty with bed mobility, transfers, and gait.   Clinical Presentation (PT Evaluation Complexity) evolving   Clinical Presentation Rationale clinical judgment, current presentation and progression.   Clinical Decision Making (Complexity) moderate complexity   Planned Therapy Interventions (PT) balance training;bed mobility training;gait training;home exercise program;neuromuscular re-education;ROM (range of motion);stair training;strengthening;stretching;transfer training;progressive activity/exercise   Risk & Benefits of therapy have been explained evaluation/treatment results reviewed;care plan/treatment goals reviewed;risks/benefits reviewed;current/potential barriers reviewed;participants voiced agreement with care plan;participants included;patient   PT  Total Evaluation Time   PT Eval, Moderate Complexity Minutes (62642) 10   Physical Therapy Goals   PT Frequency 5x/week   PT Predicted Duration/Target Date for Goal Attainment 01/21/24   PT Goals Bed Mobility;Transfers;Gait;Stairs   PT: Bed Mobility Supervision/stand-by assist;Supine to/from sit   PT: Transfers Minimal assist;Sit to/from stand;Assistive device   PT: Gait Minimal assist;Rolling walker;50 feet   PT: Stairs 5 stairs;Minimal assist;Rail on both sides   PT Discharge Planning   PT Plan progress bed mob, Ax2 for transfers (SS vs. FWW), trial gait as able. Encourage OOB mobility (may be best to consider functional tasks for encouragement (getting to chair for meals, back to bed for rest, to commode, etc.)   PT Discharge Recommendation (DC Rec) Transitional Care Facility   PT Rationale for DC Rec Pt at baseline is IND with functional mobility, no AD for gait. Pt has had rapid decline. Lives in house with spouse. Pt is currently below baseline, required Ax2 for STS with jesus alberto marinelli on this date, unable to ambulate. Limited mostly by confusion and cognition deficits, also weakness. Recommend TCF; however, pending progress and pending medical team/family decision with pt's care, may consider going home. Will continue to follow goals of care.   PT Brief overview of current status bed mob min-modAx2, STS with minaX2 and Jesus Alberto Stedy, rec lift for bed > chair with nursing given confusion.   Total Session Time   Total Session Time (sum of timed and untimed services) 10

## 2024-01-14 NOTE — PROGRESS NOTES
Sepsis protocol was triggered and lactic acid is elevated 2.6 which is actually improved from before.  Her blood pressure is currently 141/82, heart rate 87, temperature 97.6  F, respiratory 18, oxygen 90% on room air so is unclear to me whether the sepsis protocol triggered, likely due to her significant leukocytosis of 40 despite multiple days of IV Zosyn for unclear possible infection.  She has metastatic pancreatic cancer innumerable liver mets and rising bilirubin which I suspect is the main reason for her elevated lactic acid.  -No further intervention at this time

## 2024-01-14 NOTE — CONSULTS
RENAL CONSULTATION NOTE    REFERRING MD:  Swati Donovan DO     REASON FOR CONSULTATION:  persistently workseing STEPHANIE, despite fluids, working up for other infectious source today, appreciate assistance     HPI:  63 y.o woman who was diagnosed with metastatic pancreatic cancer in 2022, admitted with generalized weakness and confusion.     Initially she was treated with FOLFIRNOX.   She was found to have disease progression on 6/2023.  FOLRINOX was resumed and disease continues to progress.  She was started on palliative gemcitabine and abraxane on 10/2023.   CT scan on 1/3/2024 showed marked disease progression, including invasion of the cancer into the upper pole of the left kidney.   Goals of care is being discussed.    On  Zosyn for possible infections process.   Going to get pan CT.     I reviewed notes from oncology (Dr. Monet and Dr. Melara), GI ([facility] -[facility]), palliative team (Rohini Duarte-NP_ and the hospitalist services.     Poor oral intake.  Getting IVF  No N/V  Some abdominal discomfort.     ROS:  A complete review of systems was performed and is negative except as noted above.    PMH:    Past Medical History:   Diagnosis Date    HTN (hypertension)        PSH:    Past Surgical History:   Procedure Laterality Date    BREAST BIOPSY, RT/LT  2016    left benign     COLONOSCOPY  04/2017    every 5 years     COLONOSCOPY N/A 4/21/2022    Procedure: COLONOSCOPY;  Surgeon: Sharif Gill MD;  Location: [facility]    IR CHEST PORT PLACEMENT > 5 YRS OF AGE  6/15/2022       MEDICATIONS:     enoxaparin ANTICOAGULANT  40 mg Subcutaneous Q24H    FLUoxetine  10 mg Oral Daily with supper    FLUoxetine  20 mg Oral Daily    heparin  5-10 mL Intracatheter Q28 Days    heparin lock flush  5-10 mL Intracatheter Q24H    insulin aspart  1-6 Units Subcutaneous TID w/meals    piperacillin-tazobactam  3.375 g Intravenous Q6H    senna-docusate  1 tablet Oral BID    Or    senna-docusate  2 tablet Oral BID    sodium chloride  (PF)  10-20 mL Intracatheter Q28 Days    sodium chloride (PF)  3 mL Intracatheter Q8H       ALLERGIES:    Allergies as of 01/10/2024    (No Known Allergies)       FH:    Family History   Problem Relation Age of Onset    Alzheimer Disease Mother     Heart Surgery Father         stent    Lung Cancer Father 88        non-smokerr    Diabetes Maternal Grandfather     Breast Cancer Maternal Aunt     Ovarian Cancer Maternal Aunt     Alzheimer Disease Maternal Aunt     Alzheimer Disease Maternal Aunt     Stomach Cancer Maternal Aunt     Asthma No family hx of     Thyroid Disease No family hx of     Colon Cancer No family hx of        SH:    Social History     Socioeconomic History    Marital status:      Spouse name: Not on file    Number of children: Not on file    Years of education: Not on file    Highest education level: Not on file   Occupational History    Not on file   Tobacco Use    Smoking status: Every Day    Smokeless tobacco: Never    Tobacco comments:     10 cigs per day   Vaping Use    Vaping Use: Never used   Substance and Sexual Activity    Alcohol use: Yes     Comment: once weekly 4-5 drinks     Drug use: No    Sexual activity: Yes     Partners: Male     Birth control/protection: Post-menopausal   Other Topics Concern    Parent/sibling w/ CABG, MI or angioplasty before 65F 55M? Not Asked   Social History Narrative    Not on file     Social Determinants of Health     Financial Resource Strain: Low Risk  (10/23/2023)    Financial Resource Strain     Within the past 12 months, have you or your family members you live with been unable to get utilities (heat, electricity) when it was really needed?: No   Food Insecurity: Low Risk  (10/23/2023)    Food Insecurity     Within the past 12 months, did you worry that your food would run out before you got money to buy more?: No     Within the past 12 months, did the food you bought just not last and you didn t have money to get more?: No   Transportation Needs:  "Low Risk  (10/23/2023)    Transportation Needs     Within the past 12 months, has lack of transportation kept you from medical appointments, getting your medicines, non-medical meetings or appointments, work, or from getting things that you need?: No   Physical Activity: Not on file   Stress: Not on file   Social Connections: Not on file   Interpersonal Safety: Low Risk  (10/23/2023)    Interpersonal Safety     Do you feel physically and emotionally safe where you currently live?: Yes     Within the past 12 months, have you been hit, slapped, kicked or otherwise physically hurt by someone?: No     Within the past 12 months, have you been humiliated or emotionally abused in other ways by your partner or ex-partner?: No   Housing Stability: Low Risk  (10/23/2023)    Housing Stability     Do you have housing? : Yes     Are you worried about losing your housing?: No       PHYSICAL EXAM:    /72 (BP Location: Left arm)   Pulse 89   Temp 97.8  F (36.6  C) (Axillary)   Resp 20   Ht 1.676 m (5' 6\")   Wt 79.1 kg (174 lb 6.1 oz)   LMP  (LMP Unknown)   SpO2 97%   BMI 28.15 kg/m    GENERAL: Frail  HEENT:  Normocephalic. No gross abnormalities.  Pupils equal. Lips are dried  CV: RRR, no murmurs, no clicks, gallops, or rubs, 1+  edema  RESP: Clear bilaterally with good efforts. No wheezes or crackles.   GI: Abdomen distended, soft, some discomfort. No guarding. No rebound.   MUSCULOSKELETAL: extremities 1+ edema  SKIN: jaundice  NEURO:  Awake, alert and oriented to self, place and date.   LYMPH: No palpable ant/post cervical     LABS:      CBC RESULTS:     Recent Labs   Lab 01/14/24  0538 01/13/24  0535 01/12/24  0937 01/12/24  0549 01/11/24  0638 01/10/24  1939   WBC 46.9* 42.8*  --  41.6* 46.8* 40.4*   RBC 2.81* 2.81*  --  2.74* 2.75* 3.12*   HGB 8.5* 8.4*  --  8.1* 8.1* 9.2*   HCT 26.8* 27.1*  --  25.4* 25.0* 28.4*    306  306 333 334 356 416       BMP RESULTS:  Recent Labs   Lab 01/14/24  1210 " 01/14/24  0538 01/14/24  0224 01/13/24  1707 01/13/24  0535 01/12/24  0549 01/11/24  0811 01/11/24  0638 01/11/24  0027 01/10/24  1920   NA  --  134*  --   --  136 135  --  131*  --  130*   POTASSIUM  --  4.3  --   --  4.6 4.5  --  4.8  --  4.5   CHLORIDE  --  103  --   --  104 102  --  100  --  91*   CO2  --  15*  --   --  17* 20*  --  17*  --  23   BUN  --  38.4*  --   --  29.8* 21.1  --  14.2  --  12.7   CR  --  1.77*  --   --  1.47*  1.50* 1.16*  --  0.68  --  0.54   * 131* 107* 126* 124* 103*   < > 91   < > 91   SKINNY  --  10.1  --   --  10.7* 11.1*  --  12.0*  --  13.9*    < > = values in this interval not displayed.       INR  Recent Labs   Lab 01/12/24  0937 01/10/24  1920   INR 1.39* 1.30*        DIAGNOSTICS:  Reviewed    CT 1/3/2024:  1.  Marked interval progression of disease. There are newly enlarged  thoracic lymph nodes, new right lung nodule, innumerable new hepatic  metastases, increased size of left pancreatic tail mass with increased  invasion of adjacent structures, and increased size of right adnexal  mass.  2.  Small-to-moderate volume of abdominopelvic ascites, new.    Head CT: 1. Mild age-related changes without acute intracranial abnormality.     CT CAP wo contrast pending    A/P:  63 y.o unfortunate woman with metastatic pancreatic cancer, relapsed with marked progression on palliative gemcitabine/abraxane, consulted for acute kidney injury.     # Acute kidney injury: Severe.    -UA with granular cast-->ATN   -poor oral intake   -ascites   -tumor invasion to the upper pole of left kidney  # Metastatic pancreatic cancer: Marked progression on palliative chemotherapy.   # Hypercalcemia due to malighnancy: Improved with treatment.   # AMS: clearer. She is oriented to self, place and date.   # FEN: 1+ edema. Acidosis.  # Lactic acidosis: Due to liver not clearing?   # Infection? On Zosyn. Blood and urine cultures wo growth.     Plan:  # Change NS to bicarb gtt  # repeat UA with micro and  FENA  # Avoid NSAIDs  # Avoid IV contrast  # Dose all medications to appropriately eGFR  # I had goals of care discussion with her , daughter and sister. I would not recommend dialysis if she kidneys failed. She is an extremely poor candidate for RRT.  I agree goals of care should be focus on comfort. I strongly encouraged the family to discuss goals of care with oncology and palliative care team again on Monday. Her  is emotional, but I think they are more receptive to the idea now.        Julio Cesar Farrell MD  [facility] - Nephrology  Office Phone: [phone]  Pager: [phone]

## 2024-01-14 NOTE — PROGRESS NOTES
[facility]  Medicine Progress Note - Hospitalist Service  Date of Admission:  1/10/2024    Assessment & Plan   Summary: Ms. Lani Walker is a 63 year old female with PMHx of metastatic pancreatic cancer (contemplating 3rd line therapy under management of Dr. Melara), who was admitted on 1/10/2024 with confusion and weakness, FTH hypercalcemia. Now with worsening lactcic acidosis and leukocytosis pending further infectious work up, nephrology consulted for worsening STEPHANIE. Pending ongoing clinical improvement.     Encephalopathy, likely multifactorial, improving   Hypercalcemia, improved to stable   Leukocytosis, worsening   Anion gap metabolic acidosis, Lactic acidosis, persistent   Presenting with 1 day of confusion and weakness. Workup notable for a calcium of 13.9 (ionized 7.8), WBC of 40.4 with more than 80% neutrophils, no acute pathology on CT head. Received IVF and calcitonin in ER. Mental status had improvement after IVF. Ca responding to calcitonin (13.9->12->11.1->10.7).   WBC has been rising based on review of labs from [place] [facility] with most recent count 23.2 on 1/8/24. Unclear etiology, but possible infection must be considered. UA negative, COVID/flu/RSV negative, US abdomen unrevealing. Patient did report some increased dyspnea. Had recent CT CAP 1/3/24 that did not show specific cause of dyspnea, but does document marked interval progression of disease. TTE was hyperdynamic but also did not reveal cause of dyspnea.   Given elevated WBC and bilirubin, H/O consulted GI for possible cholangitis. GI felt ERCP is unlikely to benefit patient as there is no ductal dilation and could potentially worsen the case as bacteria would be injected up into the bile duct. MRCP was obtained 1/13/24 with findings as below including increased ascites. Patient remained confused and was becoming weaker, despite improvement in calcium. Per her oncology team on 1/13/24, she had new RUQ pain relative  to a week ago and given that WBC and bilirubin continue to rise infectious work up was pursued and CT CAP is now pending 01/14/24.   - generous fluid support with  ml/hr (increased from 100 /hr 01/14/24)   - s/p Calcitonin and Zoledronic acid   - MRCP with no evidence for infection despite persistently worsening WBC, will obtain CT CAP 01/14/24 to evaluate for other infectious source   1.  Hepatomegaly secondary to innumerable hepatic metastases has progressed in the short interval since 01/03/2024. There is a relatively small volume of normal functioning hepatic parenchyma which likely accounts for the rising bilirubin.  2.  Large pancreatic tail mass invades the spleen, left kidney, left adrenal gland, posterior wall proximal stomach, left diaphragmatic adán and occludes the splenic vein.   3.  Severe subcutaneous and intra-abdominal fat edema and moderate volume ascites have all progressed.  - paracentesis (therapeutic and diagnotic) ordered  in setting of enlarging ascites and encephalopathy - however has been on pip-tazo which would cover for SBP, planning to perform on 1/15/24   - CT CAP pending 01/14/24   - sepsis protocol,   ml bolus x2, repeat lactic acid   - adding vancomycin until other infectious process ruled out   - repeat blood cultures pending   - if no infectious source is found, lab abnormalities may be related to progressive disease      STEPHANIE , worsening   Cr now at 1.7 from 1.5. unclear etiology. Has been getting IVF so unlikely hypovolemia. May be related to Zometa. Still having good urine output. If worsening, consider nephrology consult.  - avoid nephrotoxins as able - added vancomycin to zosyn today, however creatinine had been rising prior to this   - nephrology consult today as has continued to worsen despite adequate fluid resuscitation   - AM CMP     Metastatic pancreatic adenocarcinoma    Innumerable metastasis   Follows with Dr. Melara. Initially did well with FOLFOX, but  "subsequently recurred. Most recently on gemcitabine and abraxane as of 12/26/23.   - Oncology following, appreciate assistance     Pancreatic insufficiency  PTA on 12-18U lantus. Has not been eating much recently. BG in 100s and has been stable.   - sliding scale insulin     Goals of care  Had discussion with daughter and . Patient too tired/altered to have meaningful discussion with this provider. Recommended DNR/DNI in setting of likely untreatable cancer. Daughter seems to understand and agrees, but  seems to overwhelmed to be able to make a decision about this. He wants more time to think about it.  Plan to complete work up as above and revisit.   - Palliative care following         Diet: Snacks/Supplements Adult: Ensure Enlive; With Meals  Regular Diet Adult    DVT Prophylaxis: Enoxaparin (Lovenox) SQ  Malin Catheter: Not present  Lines: PRESENT      Port a Cath 06/15/22 Single Lumen Right Chest wall-Site Assessment: WDL      Cardiac Monitoring: None  Code Status: Full Code    Sister,  and daughter updated at bedside 01/14/24 - all questions were answered     Clinically Significant Risk Factors           # Hypercalcemia: Highest Ca = 10.7 mg/dL in last 2 days, will monitor as appropriate    # Hypoalbuminemia: Lowest albumin = 2.2 g/dL at 1/14/2024  5:38 AM, will monitor as appropriate    # Coagulation Defect: INR = 1.39 (Ref range: 0.85 - 1.15) and/or PTT = 29 Seconds (Ref range: 22 - 38 Seconds), will monitor for bleeding   # Acute Kidney Injury, unspecified: based on a >150% or 0.3 mg/dL increase in last creatinine compared to past 90 day average, will monitor renal function  # Hypertension: Noted on problem list        # Overweight: Estimated body mass index is 28.15 kg/m  as calculated from the following:    Height as of this encounter: 1.676 m (5' 6\").    Weight as of this encounter: 79.1 kg (174 lb 6.1 oz)., PRESENT ON ADMISSION            Disposition Plan     Expected Discharge Date: " 01/15/2024                    Swati Donovan, DO  Hospitalist Service  [facility]  Securely message with [employer] (more info)  Text page via [employer] Paging/Directory   ______________________________________________________________________    Interval History   No acute overnight events.  Patient is sleeping when provider enters the room today. Per her sisters report they were able to have a reasonable conversation when she arrive.   She does continue to be tired but is answering questions appropriate.   Sister reports that they went out to dinner together a week ago.   No other new concerns or complaints today     Physical Exam   Vital Signs: Temp: 97.8  F (36.6  C) Temp src: Axillary BP: 121/72 Pulse: 89   Resp: 20 SpO2: 97 % O2 Device: None (Room air)    Weight: 174 lbs 6.14 oz    Constitutional: Awake, appears tired but arouses easily, no distress, and cooperative  Cardiovascular: Regular rate and rhythm, normal S1 and S2, no S3 or S4, and no murmur noted  Respiratory: Normal work of breathing, good air exchange, clear to auscultation bilaterally, no crackles or wheezing  Gastrointestinal: Abdomen soft, R sided tenderness throughout, distended - mildly. BS normal. No masses, organomegaly   Skin: pale skin color   Neuro: no focal deficits   MSK: bilateral pitting 3+ LE edema present     Medical Decision Making       55 MINUTES SPENT BY ME on the date of service doing chart review, history, exam, documentation & further activities per the note.      Data     I have personally reviewed the following data over the past 24 hrs:    46.9 (H)  \   8.5 (L)   / 259     134 (L) 103 38.4 (H) /  131 (H)   4.3 15 (L) 1.77 (H) \     ALT: 48 AST: 153 (H) AP: 223 (H) TBILI: 5.9 (H)   ALB: 2.2 (L) TOT PROTEIN: 5.0 (L) LIPASE: N/A     Procal: N/A CRP: N/A Lactic Acid: 3.0 (H)

## 2024-01-14 NOTE — PLAN OF CARE
Goal Outcome Evaluation:      Plan of Care Reviewed With: patient    Overall Patient Progress: decliningOverall Patient Progress: declining        Pertinent assessments: alert with confusion, denies pain, assist of 2 w/ lift. On RA. Regular diet. Takes pills whole w/ fluids. Continent of urine, no bm this shift. BG- 107. Port infusing normal saline @ 100ml/hr.     Major Shift Events pt rested well throughout the shift     Treatment Plan: IV antibiotics and monitor     Bedside Nurse: Jocelyne Celis RN

## 2024-01-14 NOTE — PROGRESS NOTES
Oncology/Hematology Chart check    Chart reviewed.  No new recommendations.  Continue to have ongoing discussion about goals of care, CODE STATUS with primary oncologist in AM.        Shirin Monet MD  [facility]

## 2024-01-14 NOTE — PHARMACY-VANCOMYCIN DOSING SERVICE
"Pharmacy Vancomycin Initial Note  Date of Service 2024  Patient's  1960  63 year old, female    Indication: Sepsis    Current estimated CrCl = Estimated Creatinine Clearance: 35 mL/min (A) (based on SCr of 1.77 mg/dL (H)).    Creatinine for last 3 days  2024:  5:49 AM Creatinine 1.16 mg/dL  2024:  5:35 AM Creatinine 1.50 mg/dL;  5:35 AM Creatinine 1.47 mg/dL  2024:  5:38 AM Creatinine 1.77 mg/dL    Recent Vancomycin Level(s) for last 3 days  No results found for requested labs within last 3 days.      Vancomycin IV Administrations (past 72 hours)        No vancomycin orders with administrations in past 72 hours.                    Nephrotoxins and other renal medications (From now, onward)      Start     Dose/Rate Route Frequency Ordered Stop    24 0400  piperacillin-tazobactam (ZOSYN) 3.375 g vial to attach to  mL bag        Note to Pharmacy: For [facility], [facility] and [facility]: For Zosyn-naive patients, use the \"Zosyn initial dose + extended infusion\" order panel.    3.375 g  over 30 Minutes Intravenous EVERY 6 HOURS 01/10/24 2324              Contrast Orders - past 72 hours (72h ago, onward)      Start     Dose/Rate Route Frequency Stop    24 1800  gadobutrol (GADAVIST) injection 8 mL         8 mL Intravenous ONCE 24 1752            [employer] Prediction of Planned Initial Vancomycin Regimen  Loading dose: N/A  Regimen: 1000 mg IV every 24 hours.  Start time: 16:00 on 2024  Exposure target: AUC24 (range)400-600 mg/L.hr   AUC24,ss: 485 mg/L.hr  Probability of AUC24 > 400: 72 %  Ctrough,ss: 15.6 mg/L  Probability of Ctrough,ss > 20: 25 %  Probability of nephrotoxicity (Lodise ALEXANDER ): 11 %          Plan:  Start vancomycin  1000 mg IV q24h.   Vancomycin monitoring method: AUC  Vancomycin therapeutic monitoring goal: 400-600 mg*h/L  Pharmacy will check vancomycin levels as appropriate in 1-3 Days.    Serum creatinine levels will be ordered daily for the first week of " therapy and at least twice weekly for subsequent weeks.      Jessika Anne, RP

## 2024-01-15 NOTE — PROGRESS NOTES
Antimicrobial Stewardship Team Note    Antimicrobial Stewardship Program - A joint venture between [facility] and [facility] to optimize antibiotic management.     Patient: Lani Walker  MRN: [id number]  Allergies: Patient has no known allergies.    Brief Summary:   Lani Walker is a 63-year-old female with PMHx of metastatic pancreatic cancer (receiving palliative chemotherapy under management of Dr. Melara), who presented on 01/10/2024 with confusion and weakness, admitted for lactic acidosis, leukocytosis, STEPHANIE. With elevated WBC and bilirubin, GI was consulted for possible cholangitis. Per GI ERCP unlikely to benefit patient, and patient instead underwent MRCP 01/13/24 with no evidence for infection. CT CAP then pursued, which was negative for infectious process. She was started on Zosyn 01/10 and Vanco 01/14 for sepsis. Despite persistently high/rising WBC, lactic acid, and bilirubin, she has remained afebrile and hemodynamically stable, with all cultures negative.          Active Anti-infective Medications   (From admission, onward)                 Start     Stop    01/14/24 1600  vancomycin  1,000 mg,   Intravenous,   200 mL/hr,   EVERY 24 HOURS        Sepsis       --    01/11/24 0400  piperacillin-tazobactam  3.375 g,   Intravenous,   EVERY 6 HOURS        Sepsis       --                Assessment/Recommendations:  Imaging, cultures, MRCP all negative for infection, suggesting leukocytosis could be progression of disease.   Recommend:  Stop vancomycin, given STEPHANIE and very low likelihood MRSA infection.   Reasonable to continue Zosyn for now, though already at five days and little evidence to suggest infection.        Pharmacy took the following actions:  Secure message to hospitalist.     Discussed with ID Staff - Dr. Juana Malave, PharmD, BCPS      Vital Signs/Clinical Features:  Vitals         01/13 0700  01/14 0659 01/14 0700  01/15 0659 01/15  0700  01/15 1410   Most Recent      Temp ( F) 97.4 -  97.7    97.6 -  97.8      97     97 (36.1) 01/15 0750    Pulse 82 -  92    89 -  91      88     88 01/15 0750    Resp 18 -  20      20      18     18 01/15 0750    /81 -  156/138    121/72 -  130/76      117/72     117/72 01/15 0750    SpO2 (%) 96 -  98    97 -  99      98     98 01/15 0750            Labs  Estimated Creatinine Clearance: 30.6 mL/min (A) (based on SCr of 2.02 mg/dL (H)).  Recent Labs   Lab Test 01/10/24  1920 01/11/24  0638 01/12/24  0549 01/13/24  0535 01/14/24  0538 01/15/24  0540   CR 0.54 0.68 1.16* 1.47*  1.50* 1.77* 2.02*       Recent Labs   Lab Test 01/19/18  0907 02/28/19  0954 09/28/20  0918 11/18/21  0809 01/10/24  1939 01/11/24  0638 01/12/24  0549 01/12/24  0937 01/13/24  0535 01/14/24  0538 01/15/24  0540   WBC 5.9 5.5 8.6   < > 40.4* 46.8* 41.6*  --  42.8* 46.9* 47.5*   ANEU 3.3 2.9 5.9  --   --   --  34.9*  --   --   --   --    ALYM 2.0 2.1 2.0  --   --   --  2.9  --   --   --   --    MAHAMED 0.6 0.4 0.7  --   --   --  3.7*  --   --   --   --    AEOS 0.1 0.0 0.1  --   --   --  0.0  --   --   --   --    HGB 12.8 14.0 13.9   < > 9.2* 8.1* 8.1*  --  8.4* 8.5* 8.3*   HCT 38.2 42.7 41.4   < > 28.4* 25.0* 25.4*  --  27.1* 26.8* 25.9*   MCV 97 98 97   < > 91 91 93  --  96 95 96    294 291   < > 416 356 334 333 306  306 259 219    < > = values in this interval not displayed.       Recent Labs   Lab Test 01/10/24  1920 01/11/24  0638 01/12/24  0549 01/13/24  0535 01/14/24  0538 01/15/24  0540   BILITOTAL 4.4* 4.8* 4.8* 5.2* 5.9* 6.5*   ALKPHOS 296* 240* 221* 214* 223* 239*   ALBUMIN 2.9* 2.4* 2.3* 2.4* 2.2* 2.1*   * 148* 150* 169* 153* 128*   ALT 46 37 43 48 48 47       Recent Labs   Lab Test 01/11/24  0638 01/11/24  1201 01/14/24  0054 01/14/24  0538 01/14/24  0940 01/15/24  0540   LACT 3.3* 2.9* 2.6* 3.1* 3.0* 3.4*           Culture Results:  7-Day Micro Results       Procedure Component Value Units Date/Time    Blood  Culture Arm, Left [[id number]]  (Normal) Collected: 01/14/24 0940    Order Status: Completed Lab Status: Preliminary result Updated: 01/15/24 1232    Specimen: Blood from Arm, Left      Culture No growth after 1 day    Blood Culture Arm, Right [[id number]] Collected: 01/14/24 0940    Order Status: Resulted Lab Status: In process Updated: 01/14/24 0952    Specimen: Blood from Arm, Right     Ascites Fluid Aerobic Bacterial Culture Routine With Gram Stain [[id number]]     Order Status: Sent Lab Status: No result     Specimen: Ascites Fluid from Peritoneum     Urine Culture [[id number]] Collected: 01/10/24 2311    Order Status: Completed Lab Status: Final result Updated: 01/12/24 0641    Specimen: Urine, Midstream      Culture <10,000 CFU/mL Mixture of Urogenital Milly    Blood Culture Arm, Right [[id number]]  (Normal) Collected: 01/10/24 2044    Order Status: Completed Lab Status: Preliminary result Updated: 01/15/24 0032    Specimen: Blood from Arm, Right      Culture No growth after 4 days    Blood Culture Hand, Left [[id number]]  (Normal) Collected: 01/10/24 2044    Order Status: Completed Lab Status: Preliminary result Updated: 01/15/24 0032    Specimen: Blood from Hand, Left      Culture No growth after 4 days            Recent Labs   Lab Test 03/02/22  1221 01/10/24  2311   URINEPH 6.0 5.0   NITRITE Negative Negative   LEUKEST Small* Negative   WBCU 0-5 1       Imaging: CT Chest Abdomen Pelvis w/o Contrast    Result Date: 1/14/2024  EXAM: CT CHEST ABDOMEN PELVIS W/O CONTRAST LOCATION: [facility] DATE: 1/14/2024 INDICATION: Persistent encephalopathy, looking for possible infection source. COMPARISON: MRI 01/13/2024, CT 01/03/2024 TECHNIQUE: CT scan of the chest, abdomen, and pelvis was performed without IV contrast. Multiplanar reformats were obtained. Dose reduction techniques were used. CONTRAST: None. FINDINGS: LUNGS AND PLEURA: Small left and trace right pleural effusions are  new from prior CT but stable from more recent MRI. Mild adjacent compressive atelectasis. Mild emphysema. A few subpleural nodules anteriorly similar to previous, for example adjacent to the anterior mediastinum on image 133 is a 7 mm nodule. Left upper lobe anteriorly image 96 there is a 6 mm nodule. MEDIASTINUM/AXILLAE: Right IJ port. Mildly enlarged right paratracheal lymph node near the thoracic inlet is unchanged. Paraesophageal lymphadenopathy also similar to previous. CORONARY ARTERY CALCIFICATION: Mild. HEPATOBILIARY: Numerous metastases throughout the liver, similar to previous. PANCREAS: Large pancreatic tail mass measures 9.5 cm in diameter, and invades the adjacent spleen, adrenal gland, left kidney and stomach. SPLEEN: Directly invaded by the large pancreatic tail mass as above. ADRENAL GLANDS: Normal right adrenal gland. KIDNEYS/BLADDER: Normal right kidney and bladder. BOWEL: Normal. LYMPH NODES: Enlarged upper abdominal lymph nodes are stable. VASCULATURE: Unremarkable. PELVIC ORGANS: Mixed solid and cystic right adnexal mass measures 5 cm in diameter, not appreciably changed. Small to moderate ascites unchanged. MUSCULOSKELETAL: Normal.     IMPRESSION: 1.  Diffuse metastatic disease, not appreciably changed from prior examinations. 2.  No acute infectious or inflammatory processes.    MR Abdomen MRCP w/o & w Contrast    Result Date: 1/13/2024  EXAM: MR ABDOMEN MRCP W/O and W CONTRAST LOCATION: [facility] DATE: 1/13/2024 INDICATION: Rising bilirubin, leukocytosis COMPARISON: 01/10/2024 RUQ US and CTs CAP 1/3/2024 and 10/31/2023 TECHNIQUE: Routine MR liver/pancreas protocol including axial and coronal MRCP sequences. 2D and 3D reconstruction performed by MR technologist including MIP reconstruction and slab cholangiograms. If performed with contrast, additional dynamic T1 post IV contrast images. CONTRAST: 8mL Gadavist FINDINGS: MRCP: No bile duct dilatation. Mild gallbladder  wall edema. No gallstones. LIVER: Hepatomegaly secondary to innumerable hepatic metastases with relatively small volume of normal functioning hepatic parenchyma. Craniocaudal dimension right hepatic lobe has increased from 21 cm to 23 cm in the short interval since the 01/03/2024 CT. PANCREAS: The 8.5 x 3.5 x 8.5 cm pancreatic tail mass invades the spleen, the left kidney, the left adrenal gland, the posterior wall of the proximal stomach, the left diaphragmatic adán and occludes the splenic vein. ADDITIONAL FINDINGS: Severe generalized subcutaneous edema, moderate volume ascites, and generalized mesenteric edema have increased since 01/03/2024.     IMPRESSION: 1.  Hepatomegaly secondary to innumerable hepatic metastases has progressed in the short interval since 01/03/2024. There is a relatively small volume of normal functioning hepatic parenchyma which likely accounts for the rising bilirubin. 2.  Large pancreatic tail mass invades the spleen, left kidney, left adrenal gland, posterior wall proximal stomach, left diaphragmatic adán and occludes the splenic vein. 3.  Severe subcutaneous and intra-abdominal fat edema and moderate volume ascites have all progressed.    US Abdomen Limited (RUQ)    Result Date: 1/10/2024  EXAM: US ABDOMEN LIMITED LOCATION: [facility] DATE: 1/10/2024 INDICATION: Elevated bilirubin. COMPARISON: CT abdomen pelvis 01/03/2024. TECHNIQUE: Limited abdominal ultrasound. FINDINGS: GALLBLADDER: Gallbladder contains sludge. Gallbladder wall is mildly thickened, measuring up to 4 mm. No discrete gallstones. No pericholecystic fluid. Sonographic Hinds's sign is negative. BILE DUCTS: No intrahepatic biliary ductal dilation. Common bile duct is not well visualized. LIVER: Liver is enlarged, measuring up to 20.5 cm. There are multiple heterogeneous metastases throughout the liver, better visualized on the recent CT. Main portal vein is patent with hepatopedal flow. RIGHT  KIDNEY: No hydronephrosis. PANCREAS: Visualized portion of the head is normal. Body and tail is obscured by overlying bowel gas. Small amount of ascites within the right upper quadrant.     IMPRESSION: 1.  Numerous metastases within both hepatic lobes, better visualized on the recent CT. 2.  No intrahepatic biliary ductal dilation. Common bile duct is not well visualized. 3.  Gallbladder contains sludge. There is mild gallbladder wall thickening, which is nonspecific in the setting of liver disease and ascites. No specific signs of acute cholecystitis. 4.  Small amount of ascites in the right upper quadrant.    CT Head w/o Contrast    Result Date: 1/10/2024  EXAM: CT HEAD W/O CONTRAST LOCATION: [facility] DATE: 1/10/2024 INDICATION: Altered mental status COMPARISON: None. TECHNIQUE: Routine CT Head without IV contrast. Multiplanar reformats. Dose reduction techniques were used. FINDINGS: INTRACRANIAL CONTENTS: No intracranial hemorrhage, extraaxial collection, or mass effect.  No CT evidence of acute infarct. Normal parenchymal attenuation for age. Normal ventricles and sulci for age. VISUALIZED ORBITS/SINUSES/MASTOIDS: No intraorbital abnormality. No significant paranasal sinus mucosal disease. No middle ear or mastoid effusion. BONES/SOFT TISSUES: No acute abnormality.     IMPRESSION: 1.  Mild age-related changes without acute intracranial abnormality.

## 2024-01-15 NOTE — PROGRESS NOTES
"PALLIATIVE CARE PROGRESS NOTE  [facility]     Patient Name: Lani Walker  Date of Admission: 1/10/2024   Today the patient was seen for:   On going goals of care  Decisional and emotional support     Recommendations & Counseling     GOALS OF CARE:   Restorative without limits   \"I want to have a peaceful death when it comes to that\"  \"I do not want to talk about death now, I will think about it over the next couple of weeks\"  She did not want to talk about code status today  Left family with information about hospice and choices.     ADVANCE CARE PLANNING:  No health care directive on file. Per  informed consent policy, next of kin should be involved if patient becomes unable.  There is no POLST form on file, defer to patient and/or next of kin for decisions   Code status: Full Code     MEDICAL MANAGEMENT:   #Pain   Opioids: oxycodone (Roxicodone) IR   Acetaminophen (Tylenol), PRN- dose reduced due to liver functions  Repositioning as able  Draining of abdomen for comfort     #Delirium  Avoid benzodiazepines, antihistamines, anticholinergics if able.  Lights on and blinds open during the day.  Reorient frequently.  Lights & TV off during the night.  Promote normal circadian rhythm.  Limit sensory deprivation - utilize hearing aids, glasses, etc.  Frequently assess basic needs such as temperature, elimination, thirst/hunger, pain  Redirection as able     #Dyspnea  Complementary therapies  Repositioning for comfort  Monitor respiratory status closely     #General Weakness/Debility   Appreciate the help of staff for ADLs per plan of care  Appreciate the input of therapies for discharge recommendations  Family is interested in equipment for at home, hospital bed, OBT, commode  Family is looking into hiring help in the home     PSYCHOSOCIAL/SPIRITUAL SUPPORT:  Family   [place] community: J[employer] would appreciate  for emotional support    Palliative Care will continue to follow. Thank " you for the consult and allowing us to aid in the care of Lani Walker.    These recommendations have been discussed with medical team.    Felicia Haywood NP  Securely message with [employer] info)  Text page via [facility] Paging/Directory      Assessments          Lani Walker is a 63 year old female with a past medical history of  metastatic pancreatic cancer-follows with Dr. Melara at [facility] who presented on 1/10/2024 with increase confusion and weakness.                 Interval History:     Per patient or family/caregivers today:  Met with patient, daughter Phyllis, sister Elizabeth, niece Della and  Jake. Discussed and reviewed goals, patient expressed that she does not want to talk about things today. Left information for the family to review when able about hospice homes, SNF vs home with hospice. Discussed that the best way to meet her needs at home is with hospice. She requested a visit tomorrow to talk through things more. Spouse said he will do whatever she wants for her cares. We discussed private pay at home. I did bring up code status and she declined to talk about that today.             Review of Systems:     Besides above, an additional  system ROS was reviewed and is unremarkable               Physical Exam:   Temp: 97  F (36.1  C) Temp src: Oral Temp  Min: 97  F (36.1  C)  Max: 97.7  F (36.5  C) BP: 117/72 Pulse: 88   Resp: 18 SpO2: 98 % O2 Device: None (Room air)    Vital Signs with Ranges  Temp:  [97  F (36.1  C)-97.7  F (36.5  C)] 97  F (36.1  C)  Pulse:  [88-91] 88  Resp:  [18-20] 18  BP: (117-130)/(72-79) 117/72  SpO2:  [97 %-99 %] 98 %  174 lbs 6.14 oz    EXAM:  Constitutional: no distress, flat effect  Cardiovascular: negative  Respiratory: negative  Psychiatric: disoriented  Abdomen: Abdomen soft, non-tender. BS normal. No masses, organomegaly  Pain: complains of abdominal pain             Current Problem List:   Principal Problem:    Leukocytosis, unspecified  type  Active Problems:    Hypercalcemia    Metabolic encephalopathy    Acute kidney failure, unspecified (H)      No Known Allergies         Data Reviewed:       Results for orders placed or performed during the hospital encounter of 01/10/24 (from the past 24 hour(s))   Ammonia   Result Value Ref Range    Ammonia 45 11 - 51 umol/L   Lactic acid whole blood   Result Value Ref Range    Lactic Acid 3.0 (H) 0.7 - 2.0 mmol/L   Blood Culture Arm, Left    Specimen: Arm, Left; Blood   Result Value Ref Range    Culture No growth after 12 hours    Glucose by meter   Result Value Ref Range    GLUCOSE BY METER POCT 151 (H) 70 - 99 mg/dL   CT Chest Abdomen Pelvis w/o Contrast    Narrative    EXAM: CT CHEST ABDOMEN PELVIS W/O CONTRAST  LOCATION: [facility]  DATE: 1/14/2024    INDICATION: Persistent encephalopathy, looking for possible infection source.  COMPARISON: MRI 01/13/2024, CT 01/03/2024  TECHNIQUE: CT scan of the chest, abdomen, and pelvis was performed without IV contrast. Multiplanar reformats were obtained. Dose reduction techniques were used.   CONTRAST: None.    FINDINGS:   LUNGS AND PLEURA: Small left and trace right pleural effusions are new from prior CT but stable from more recent MRI. Mild adjacent compressive atelectasis. Mild emphysema. A few subpleural nodules anteriorly similar to previous, for example adjacent to   the anterior mediastinum on image 133 is a 7 mm nodule. Left upper lobe anteriorly image 96 there is a 6 mm nodule.    MEDIASTINUM/AXILLAE: Right IJ port. Mildly enlarged right paratracheal lymph node near the thoracic inlet is unchanged. Paraesophageal lymphadenopathy also similar to previous.    CORONARY ARTERY CALCIFICATION: Mild.    HEPATOBILIARY: Numerous metastases throughout the liver, similar to previous.    PANCREAS: Large pancreatic tail mass measures 9.5 cm in diameter, and invades the adjacent spleen, adrenal gland, left kidney and stomach.    SPLEEN: Directly  invaded by the large pancreatic tail mass as above.    ADRENAL GLANDS: Normal right adrenal gland.    KIDNEYS/BLADDER: Normal right kidney and bladder.    BOWEL: Normal.    LYMPH NODES: Enlarged upper abdominal lymph nodes are stable.    VASCULATURE: Unremarkable.    PELVIC ORGANS: Mixed solid and cystic right adnexal mass measures 5 cm in diameter, not appreciably changed. Small to moderate ascites unchanged.    MUSCULOSKELETAL: Normal.      Impression    IMPRESSION:  1.  Diffuse metastatic disease, not appreciably changed from prior examinations.  2.  No acute infectious or inflammatory processes.   Glucose by meter   Result Value Ref Range    GLUCOSE BY METER POCT 143 (H) 70 - 99 mg/dL   Glucose by meter   Result Value Ref Range    GLUCOSE BY METER POCT 177 (H) 70 - 99 mg/dL   Glucose by meter   Result Value Ref Range    GLUCOSE BY METER POCT 178 (H) 70 - 99 mg/dL   Comprehensive metabolic panel   Result Value Ref Range    Sodium 134 (L) 135 - 145 mmol/L    Potassium 4.0 3.4 - 5.3 mmol/L    Carbon Dioxide (CO2) 17 (L) 22 - 29 mmol/L    Anion Gap 15 7 - 15 mmol/L    Urea Nitrogen 43.8 (H) 8.0 - 23.0 mg/dL    Creatinine 2.02 (H) 0.51 - 0.95 mg/dL    GFR Estimate 27 (L) >60 mL/min/1.73m2    Calcium 9.4 8.8 - 10.2 mg/dL    Chloride 102 98 - 107 mmol/L    Glucose 179 (H) 70 - 99 mg/dL    Alkaline Phosphatase 239 (H) 40 - 150 U/L     (H) 0 - 45 U/L    ALT 47 0 - 50 U/L    Protein Total 4.9 (L) 6.4 - 8.3 g/dL    Albumin 2.1 (L) 3.5 - 5.2 g/dL    Bilirubin Total 6.5 (H) <=1.2 mg/dL   CBC with platelets   Result Value Ref Range    WBC Count 47.5 (H) 4.0 - 11.0 10e3/uL    RBC Count 2.70 (L) 3.80 - 5.20 10e6/uL    Hemoglobin 8.3 (L) 11.7 - 15.7 g/dL    Hematocrit 25.9 (L) 35.0 - 47.0 %    MCV 96 78 - 100 fL    MCH 30.7 26.5 - 33.0 pg    MCHC 32.0 31.5 - 36.5 g/dL    RDW 27.1 (H) 10.0 - 15.0 %    Platelet Count 219 150 - 450 10e3/uL   Magnesium   Result Value Ref Range    Magnesium 1.9 1.7 - 2.3 mg/dL   Phosphorus    Result Value Ref Range    Phosphorus 2.3 (L) 2.5 - 4.5 mg/dL   Lactic acid whole blood   Result Value Ref Range    Lactic Acid 3.4 (H) 0.7 - 2.0 mmol/L          Medical Decision Making       40 MINUTES SPENT BY ME on the date of service doing chart review, history, exam, documentation & further activities per the note.

## 2024-01-15 NOTE — PLAN OF CARE
Goal Outcome Evaluation:                      To Do:  End of Shift Summary  For vital signs and complete assessments, please see documentation flowsheets.     Pertinent assessments: Slightly more alert and less confused ----  arouses to voice --  -   voiding using lift and bedpan----   denies pain and nausea  -- poor appetite ---  all over her body edema appears to be increasing   Major Shift Events resting comfortable---  Treatment Plan: monitor  Bedside Nurse: Ene Gonzalez RN

## 2024-01-15 NOTE — CONSULTS
"SPIRITUAL HEALTH SERVICES - Consult Note  [facility] Med/Surg 5    Referral Source: [facility] consult; family requested further  support.    I was accompanied by  Intern Sukh Cavanaugh.  Pt Lani affirmed that she is Temple and welcomed prayer.  She requested that we return tomorrow for a spiritual care conversation.  Lani mentioned that she is having her \"stomach punctured today\" (referring to a paracentesis, per RN).     Plan: Will triage request for follow-up  support tomorrow 1/16/2024.    Narayan Lay M.Div., [facility]  Staff     SHS available 24/7 for emergent requests/referrals, either by paging the on-call  or by entering an ASAP/STAT consult in [facility], which will also page the on-call .   "

## 2024-01-15 NOTE — PLAN OF CARE
Goal Outcome Evaluation:      Plan of Care Reviewed With: patient    Overall Patient Progress: decliningOverall Patient Progress: declining     Shift: 7pm-7am    Vitals: Temp: 97.6  F (36.4  C) Temp src: Axillary BP: 124/79 Pulse: 91   Resp: 20 SpO2: 97 % O2 Device: None (Room air)       Orientation: Orientated to self   Pain: complained of some back pain- PRN tylenol given   Activity: Assist of 2 w/ lift   Resp: clear on RA   Diet: regular diet   How to take meds: whole w/ fluids   GI & : incontinent of bowel and bladder, 1 large bm this shift   BG: ACHS 177 & 178  Skin: scattered bruising   Lines: port accessed - infusing D5W w/ bicarb @ 80 ml/hr    Plan: Paracentesis

## 2024-01-15 NOTE — PROGRESS NOTES
Paracentesis completed per Dr. Jacobs without difficulty for 2900 ml clear dark asad fluid, patient tolerated well. Transferred to room via cart in stable condition.

## 2024-01-16 NOTE — PLAN OF CARE
"Goal Outcome Evaluation:           Overall Patient Progress: no changeOverall Patient Progress: no change     Pertinent assessments: Pt confused at times; disoriented to place, time and situation. Not oob overnight. Port patent; labs drawn, flushed, cap changed, infusing D5W w/bicarb @ 80ml/hr. Zosyn given x2. B, 185. Commented that she wasn't \"going to be here for long but that's just the way it goes.\"    Major Shift Events: none    Treatment Plan: IV ABX, IVF, encourage PO intake, goals of care conversation    Bedside Nurse: Munira Parker RN      "

## 2024-01-16 NOTE — PROGRESS NOTES
[facility] Follow Up Note:    Assessment and Plan:  #1 Metastatic pancreatic cancer, follows with Dr. Melara  - Disease recently progressed on FOLFIRINOX and Gemcitabine/Abraxane  - No standard treatment option left  - Blood sample sent for NGS testing.  Also scheduled for liver biopsy next week to send fresh sample for NGS testing, to see if there is any targetable mutation or if her disease could be treated with immunotherapy, but it is unlikely we will find anything that would open up new treatment options.  - Patient's functional status has declined rapidly over the past month, especially over the past few days     PLAN:  - Ongoing goals of care discussion with the patient and her family.  Lani is less confused due to correction of hypercalcemia, but she is still very lethargic. Rapid decline over the past few days is not just due to disease progression.  With elevated WBC and hyperbilirubinemia, along with RUQ abdominal pain, - d/d  cholangitis which could be potentially reversible   - Per Dr Melara- discussed with Lani and her family that unless we find something reversible and that her functional status improves, she would not be a candidate to receive any further systemic therapy even if we find something actionable on NGS testing.     #2 Hypercalcemia of malignancy  - Calcitonin x 2 and IVF  - Zometa on 1/11  - Ca down to 9.4     PLAN:  - Continue IVF.  OK to give more calcitonin if needed     #3 Leukocytosis  #4 Hyperbilirubinemia  - Bilirubin was normal 2 weeks ago.  WBC stilll up and rising- 47.5K   - Has RUQ tenderness  - Presentation concerning for cholangitis  - Seen by GI.  Initially planned for ERCP at 4pm 1/12, but this was cancelled because GI staff who saw her laterdid not think Lani would benefit from ERCP  - On Zosyn  - Bilirubin increased to 5.29today.  - Blood and urine cultures negative so far.  - U/S showed no intrahepatic biliary ductal dilatation.  Common bile duct  not very well visualized.  Gallbladder contains sludge     PLAN:  - Cannot rule out cholangitis with rapidly rising WBC and bilirubin, in the setting of RUQ abdominal pain, but rising bilirubin could also be related to disease progression in the liver  - ERCP cancelled by [facility]    MRCP Done on 1/13 shows hepatomegaly, minimal functioning liver, moderate volume ascites  - Continue Zosyn  - Discussed situation with hospitalist.    ? Leukoerythroblastic picture from metastatic cancer      #5 Goals of care  - Will continue to address with patient and her family.  Hopefully she can be more involved in decision making once her mental status clears up more with correction of hypercalcemia.    - Per Dr Melara- Concerned her function status has declined rapidly over the past month.  A month ago, she was still doing relatively well and still traveling.  She was able to come to clinic to see me on Monday and have lunch with her daughter.  Now she requires 2 person assist to get out of bed, and is very lethargic.  If the rapid decline is due to progression of cancer instead of something more reversible, she would not benefit from any further treatment of her metastatic pancreatic cancer  Guarded prognosis     # 6- ARF  Creat rising now  - ? Some impact from zometa  - continue IVF  - may need nephrology consult  - - Caution with iv iodine        Discussed with patient today, she is ready for hospice. Noted SW input.  Subjective:    Feeling OK, wants to leave hospital at some point but wants us to contact her daughter      Labs:  All labs reviewed    CBC  Recent Labs   Lab 01/16/24  0602 01/15/24  0540 01/14/24  0538 01/13/24  0535   WBC  --  47.5* 46.9* 42.8*   HGB  --  8.3* 8.5* 8.4*   MCV  --  96 95 96    219 259 306  306       CMP  Recent Labs   Lab 01/16/24  0854 01/16/24  0602 01/16/24  0202 01/15/24  2138 01/15/24  1653 01/15/24  0857 01/15/24  0540 01/14/24  1210 01/14/24  0538 01/13/24  1707 01/13/24  0535  "01/12/24  0549   NA  --   --   --   --   --   --  134*  --  134*  --  136 135   POTASSIUM  --   --   --   --   --   --  4.0  --  4.3  --  4.6 4.5   CHLORIDE  --   --   --   --   --   --  102  --  103  --  104 102   CO2  --   --   --   --   --   --  17*  --  15*  --  17* 20*   ANIONGAP  --   --   --   --   --   --  15  --  16*  --  15 13   *  --  185* 186* 190*   < > 179*   < > 131*   < > 124* 103*   BUN  --   --   --   --   --   --  43.8*  --  38.4*  --  29.8* 21.1   CR  --  2.41*  --   --   --   --  2.02*  --  1.77*  --  1.47*  1.50* 1.16*   GFRESTIMATED  --  22*  --   --   --   --  27*  --  32*  --  40*  39* 53*   SKINNY  --   --   --   --   --   --  9.4  --  10.1  --  10.7* 11.1*   MAG  --   --   --   --   --   --  1.9  --   --   --   --   --    PHOS  --   --   --   --   --   --  2.3*  --   --   --   --   --    PROTTOTAL  --   --   --   --   --   --  4.9*  --  5.0*  --  5.2* 5.1*   ALBUMIN  --   --   --   --   --   --  2.1*  --  2.2*  --  2.4* 2.3*   BILITOTAL  --   --   --   --   --   --  6.5*  --  5.9*  --  5.2* 4.8*   ALKPHOS  --   --   --   --   --   --  239*  --  223*  --  214* 221*   AST  --   --   --   --   --   --  128*  --  153*  --  169* 150*   ALT  --   --   --   --   --   --  47  --  48  --  48 43    < > = values in this interval not displayed.       INR  Recent Labs   Lab 01/12/24  0937 01/10/24  1920   INR 1.39* 1.30*       Blood CultureNo results for input(s): \"CULT\" in the last 168 hours.      Nayana Kemp MD  [facility]  1/16/2024 12:41 PM       "

## 2024-01-16 NOTE — PROGRESS NOTES
[facility] Follow Up Note:    Assessment and Plan:    #1 Metastatic pancreatic cancer, follows with Dr. Melara  - Disease recently progressed on FOLFIRINOX and Gemcitabine/Abraxane  - No standard treatment option left  - Blood sample sent for NGS testing.  Also scheduled for liver biopsy next week to send fresh sample for NGS testing, to see if there is any targetable mutation or if her disease could be treated with immunotherapy, but it is unlikely we will find anything that would open up new treatment options.  - Patient's functional status has declined rapidly over the past month, especially over the past few days     PLAN:  - Ongoing goals of care discussion with the patient and her family.  Lani is less confused due to correction of hypercalcemia, but she is still very lethargic. Rapid decline over the past few days is not just due to disease progression.  With elevated WBC and hyperbilirubinemia, along with RUQ abdominal pain, - d/d  cholangitis which could be potentially reversible   - Per Dr Melara- discussed with Lani and her family that unless we find something reversible and that her functional status improves, she would not be a candidate to receive any further systemic therapy even if we find something actionable on NGS testing.     #2 Hypercalcemia of malignancy  - Calcitonin x 2 and IVF  - Zometa on 1/11  - Ca down to 9.4     PLAN:  - Continue IVF.  OK to give more calcitonin if needed     #3 Leukocytosis  #4 Hyperbilirubinemia  - Bilirubin was normal 2 weeks ago.  WBC stilll up and rising- 47.5K   - Has RUQ tenderness  - Presentation concerning for cholangitis  - Seen by GI.  Initially planned for ERCP at 4pm 1/12, but this was cancelled because GI staff who saw her laterdid not think Lani would benefit from ERCP  - On Zosyn  - Bilirubin increased to 5.29today.  - Blood and urine cultures negative so far.  - U/S showed no intrahepatic biliary ductal dilatation.  Common bile  duct not very well visualized.  Gallbladder contains sludge     PLAN:  - Cannot rule out cholangitis with rapidly rising WBC and bilirubin, in the setting of RUQ abdominal pain, but rising bilirubin could also be related to disease progression in the liver  - ERCP cancelled by [facility]    MRCP Done on 1/13 shows hepatomegaly, minimal functioning liver, moderate volume ascites  - Continue Zosyn  - Discussed situation with hospitalist.    ? Leukoerythroblastic picture from metastatic cancer      #5 Goals of care  - Will continue to address with patient and her family.  Hopefully she can be more involved in decision making once her mental status clears up more with correction of hypercalcemia.    - Per Dr Melara- Concerned her function status has declined rapidly over the past month.  A month ago, she was still doing relatively well and still traveling.  She was able to come to clinic to see me on Monday and have lunch with her daughter.  Now she requires 2 person assist to get out of bed, and is very lethargic.  If the rapid decline is due to progression of cancer instead of something more reversible, she would not benefit from any further treatment of her metastatic pancreatic cancer  - Per Dr Melara-long discussion about her code status with Lani and her family and friends 1/12.  Lani is now more open to DNR/DNI, Lani's family has decided to keep her FULL CODE for now.  Guarded prognosis     # 6- ARF  Creat rising now  - ? Some impact from zometa  - continue IVF  - may need nephrology consult  - - Caution with iv iodine       Discussed with patient today, she was not normal for discussed goals of care, I also reviewed palliative care's note, will attempt to continue this discussions and also discussed with family.  Subjective:    Feeling about the same, she asked me if she will be discharged from the hospital tomorrow      Labs:  All labs reviewed    CBC  Recent Labs   Lab 01/15/24  0540 01/14/24  0538  "01/13/24  0535   WBC 47.5* 46.9* 42.8*   HGB 8.3* 8.5* 8.4*   MCV 96 95 96    259 306  306       CMP  Recent Labs   Lab 01/15/24  1653 01/15/24  1305 01/15/24  0857 01/15/24  0540 01/14/24  1210 01/14/24  0538 01/13/24  1707 01/13/24  0535 01/12/24  0549   NA  --   --   --  134*  --  134*  --  136 135   POTASSIUM  --   --   --  4.0  --  4.3  --  4.6 4.5   CHLORIDE  --   --   --  102  --  103  --  104 102   CO2  --   --   --  17*  --  15*  --  17* 20*   ANIONGAP  --   --   --  15  --  16*  --  15 13   * 202* 185* 179*   < > 131*   < > 124* 103*   BUN  --   --   --  43.8*  --  38.4*  --  29.8* 21.1   CR  --   --   --  2.02*  --  1.77*  --  1.47*  1.50* 1.16*   GFRESTIMATED  --   --   --  27*  --  32*  --  40*  39* 53*   SKINNY  --   --   --  9.4  --  10.1  --  10.7* 11.1*   MAG  --   --   --  1.9  --   --   --   --   --    PHOS  --   --   --  2.3*  --   --   --   --   --    PROTTOTAL  --   --   --  4.9*  --  5.0*  --  5.2* 5.1*   ALBUMIN  --   --   --  2.1*  --  2.2*  --  2.4* 2.3*   BILITOTAL  --   --   --  6.5*  --  5.9*  --  5.2* 4.8*   ALKPHOS  --   --   --  239*  --  223*  --  214* 221*   AST  --   --   --  128*  --  153*  --  169* 150*   ALT  --   --   --  47  --  48  --  48 43    < > = values in this interval not displayed.       INR  Recent Labs   Lab 01/12/24  0937 01/10/24  1920   INR 1.39* 1.30*       Blood CultureNo results for input(s): \"CULT\" in the last 168 hours.      Nayana Kemp MD  [facility]  1/15/2024 6:11 PM       "

## 2024-01-16 NOTE — PROGRESS NOTES
[facility]    Nephrology Progress Note    Assessment & Plan        <SEVERE STEPHANIE    ~Some ongoing decline in renal function. Presumed ATN.    ~No indication for RRT at this time and I agree with Dr. Farrell in that I would not recommend dialysis for Lani given overall trajectory of her illness and she would not gain symptom relief and would tolerate poorly.     ~With the plan being to go to hospice upon discharge would consider discontinuing lab draws as we would not base comfort decisions on lab values.     <LOW TOTAL CO2    ~Total CO2 on her panel has not increased with the bicarb infusion.    ~With that lack of response would suspect the low CO2 is related to cirrhosis associated chronic respiratory acidosis.     -IV bicarb infusion discontinued.         Harvey Danielson MD  Nephrology  [facility]  Cell:[phone]  On [employer]   Pager:[phone]        .........    Interval History     Creatinine 2.02->2.41. Does continue to make urine.         Temp: 97.2  F (36.2  C) Temp src: Axillary BP: 108/82 Pulse: 89   Resp: 16 SpO2: 99 % O2 Device: None (Room air)      Vitals:    01/10/24 2121 01/11/24 1407 01/13/24 0656   Weight: 73 kg (161 lb) 76.1 kg (167 lb 12.3 oz) 79.1 kg (174 lb 6.1 oz)       Vital Signs with Ranges    Temp:  [97.2  F (36.2  C)-97.8  F (36.6  C)] 97.2  F (36.2  C)  Pulse:  [73-89] 89  Resp:  [16] 16  BP: (108-111)/(72-82) 108/82  SpO2:  [98 %-99 %] 99 %    I/O last 3 completed shifts:  In: 2612 [I.V.:2612]  Out: -     Physical Exam    BP Readings from Last 5 Encounters:   01/16/24 108/82   10/23/23 119/80   06/15/22 124/83   06/08/22 (!) 145/88   06/02/22 96/73        Wt Readings from Last 10 Encounters:   01/13/24 79.1 kg (174 lb 6.1 oz)   10/23/23 80.3 kg (177 lb)   06/02/22 76.6 kg (168 lb 12.8 oz)   05/23/22 78.7 kg (173 lb 9.6 oz)   05/20/22 80.7 kg (178 lb)   04/27/22 80.7 kg (178 lb)   04/21/22 79.4 kg (175 lb)   03/02/22 82.6 kg (182 lb 1.6 oz)   11/18/21  80.7 kg (178 lb)   06/30/21 80.7 kg (178 lb)     GENERAL: Alert, in no apparent distress. Confused though to day, etc.   HEENT:  Normocephalic. No gross abnormalities.  The mouth moist.    Neck The jugular venous pressure is not elevated  CV: RRR  RESP: Breathing unlabored   MUSCULOSKELETAL: extremities nl - no gross deformities noted. anasarca  SKIN: no new lesions or rashes, dry to touch.  NEURO:  No overt deficit aside from confusion.   PSYCH: mood OK, affect appropriate        Medications      D5W 1,000 mL with sodium bicarbonate 150 mEq/L infusion 80 mL/hr at 01/16/24 1159        enoxaparin ANTICOAGULANT  30 mg Subcutaneous Q24H    FLUoxetine  10 mg Oral Daily with supper    FLUoxetine  20 mg Oral Daily    heparin  5-10 mL Intracatheter Q28 Days    heparin lock flush  5-10 mL Intracatheter Q24H    insulin aspart  1-6 Units Subcutaneous TID w/meals    piperacillin-tazobactam  3.375 g Intravenous Q6H    senna-docusate  1 tablet Oral BID    Or    senna-docusate  2 tablet Oral BID    sodium chloride (PF)  10-20 mL Intracatheter Q28 Days       Data     UA RESULTS:  Recent Labs   Lab Test 01/10/24  2311 03/02/22  1221   COLOR Dark Yellow* Yellow   APPEARANCE Cloudy* Clear   URINEGLC Negative Negative   URINEBILI Small* Negative   URINEKETONE Trace* Negative   SG 1.025 1.010   UBLD Negative Negative   URINEPH 5.0 6.0   PROTEIN 30* Negative   UROBILINOGEN  --  0.2   NITRITE Negative Negative   LEUKEST Negative Small*   RBCU 2 None Seen   WBCU 1 0-5      BMP  Recent Labs   Lab 01/16/24  1253 01/16/24  0854 01/16/24  0602 01/16/24  0202 01/15/24  2138 01/15/24  0857 01/15/24  0540 01/14/24  1210 01/14/24  0538 01/13/24  1707 01/13/24  0535 01/12/24  0549   NA  --   --   --   --   --   --  134*  --  134*  --  136 135   POTASSIUM  --   --   --   --   --   --  4.0  --  4.3  --  4.6 4.5   CHLORIDE  --   --   --   --   --   --  102  --  103  --  104 102   SKINNY  --   --   --   --   --   --  9.4  --  10.1  --  10.7* 11.1*   CO2   "--   --   --   --   --   --  17*  --  15*  --  17* 20*   BUN  --   --   --   --   --   --  43.8*  --  38.4*  --  29.8* 21.1   CR  --   --  2.41*  --   --   --  2.02*  --  1.77*  --  1.47*  1.50* 1.16*   * 173*  --  185* 186*   < > 179*   < > 131*   < > 124* 103*    < > = values in this interval not displayed.     Phos@LABNTIPR(phos:4)  CBC)  Recent Labs   Lab 01/16/24  0602 01/15/24  0540 01/14/24  0538 01/13/24  0535 01/12/24  0937 01/12/24  0549   WBC  --  47.5* 46.9* 42.8*  --  41.6*   HGB  --  8.3* 8.5* 8.4*  --  8.1*   HCT  --  25.9* 26.8* 27.1*  --  25.4*   MCV  --  96 95 96  --  93    219 259 306  306   < > 334    < > = values in this interval not displayed.     Lab Results   Component Value Date    ALBUMIN 2.1 01/15/2024    ALBUMIN 2.2 01/14/2024    ALBUMIN 2.4 01/13/2024    ALBUMIN 4.0 06/02/2022    ALBUMIN 4.0 11/18/2021    ALBUMIN 3.9 09/28/2020    ALBUMIN 4.7 02/28/2019    ALBUMIN 4.3 08/17/2018        Recent Labs   Lab 01/15/24  0540   HGB 8.3*   HCT 25.9*   MCV 96       Recent Labs   Lab 01/15/24  0540 01/14/24  0940   *  --    ALT 47  --    ALKPHOS 239*  --    BILITOTAL 6.5*  --    ANA  --  45     Recent Labs   Lab 01/12/24  0937   INR 1.39*     No results found for: \"D2VIT\", \"D3VIT\", \"DTOT\"  No results for input(s): \"PTHI\" in the last 168 hours.    Attestation:   I have reviewed today's relevant vital signs, notes, medications, labs and imaging.    "

## 2024-01-16 NOTE — PROGRESS NOTES
"PALLIATIVE CARE PROGRESS NOTE  [facility]     Patient Name: Lani Walker  Date of Admission: 1/10/2024   Today the patient was seen for:   On going goals of care  Decisional and emotional support     Recommendations & Counseling     GOALS OF CARE:   Restorative without limits   \"I want to have a peaceful death when it comes to that\"  Hospice on discharge  Will discuss comfort cares again on 1/17 with patient and family     ADVANCE CARE PLANNING:  No health care directive on file. Per  informed consent policy, next of kin should be involved if patient becomes unable.  There is no POLST form on file, defer to patient and/or next of kin for decisions   Code status: No CPR- Do NOT Intubate     MEDICAL MANAGEMENT:   #Pain   Opioids: oxycodone (Roxicodone) IR   Acetaminophen (Tylenol), PRN- dose reduced due to liver functions  Repositioning as able  Draining of abdomen for comfort     #Delirium  Avoid benzodiazepines, antihistamines, anticholinergics if able.  Lights on and blinds open during the day.  Reorient frequently.  Lights & TV off during the night.  Promote normal circadian rhythm.  Limit sensory deprivation - utilize hearing aids, glasses, etc.  Frequently assess basic needs such as temperature, elimination, thirst/hunger, pain  Redirection as able     #Dyspnea  Complementary therapies  Repositioning for comfort  Monitor respiratory status closely     #General Weakness/Debility   Appreciate the help of staff for ADLs per plan of care  Appreciate the input of therapies for discharge recommendations  Family is interested in equipment for at home, hospital bed, OBT, commode  Family is looking into hiring help in the home     PSYCHOSOCIAL/SPIRITUAL SUPPORT:  Family   [place] community: [place] would appreciate  for emotional support    Palliative Care will continue to follow. Thank you for the consult and allowing us to aid in the care of Lani Walker.    These " "recommendations have been discussed with medical team.    Felicia Haywood NP  Securely message with [employer] (more info)  Text page via [facility] Paging/Directory      Assessments          Lani Walker is a 63 year old female with a past medical history of  metastatic pancreatic cancer-follows with Dr. Melara at [facility] who presented on 1/10/2024 with increase confusion and weakness.                 Interval History:     Per patient or family/caregivers today:  Met with patient and her family today. Patient is not interested in talking much about her illness, and next steps. We talked about code status and the recommendation for DNR DNI given her illness and cancer. She said \"ok\" she didn't have questions or concerns about this. Her family reports they do not want her to have CPR or intubation. They would like to proceed with hospice and placement. When discussed with her she said \"ok but I don't want to pick a place today\" discussed that we will have SW send referrals and let her know her options. She said \"ok, and I am done talking about this\". Family in agreement and made some choices for facilities.            Review of Systems:     Besides above, an additional  system ROS was reviewed and is unremarkable               Physical Exam:   Temp: 97.2  F (36.2  C) Temp src: Axillary Temp  Min: 97.2  F (36.2  C)  Max: 97.8  F (36.6  C) BP: 108/82 Pulse: 89   Resp: 16 SpO2: 99 % O2 Device: None (Room air)    Vital Signs with Ranges  Temp:  [97.2  F (36.2  C)-97.8  F (36.6  C)] 97.2  F (36.2  C)  Pulse:  [73-89] 89  Resp:  [16] 16  BP: (108-114)/(68-82) 108/82  SpO2:  [96 %-99 %] 99 %  174 lbs 6.14 oz    EXAM:  Constitutional: no distress, flat effect  Cardiovascular: negative  Respiratory: negative  Psychiatric: flat affect  Abdomen: Abdomen soft, non-tender. BS normal. No masses, organomegaly  Pain: complains of abdominal pain             Current Problem List:   Principal Problem:    Leukocytosis, unspecified " type  Active Problems:    Hypercalcemia    Metabolic encephalopathy    Acute kidney failure, unspecified (H)      No Known Allergies         Data Reviewed:       Results for orders placed or performed during the hospital encounter of 01/10/24 (from the past 24 hour(s))   Glucose by meter   Result Value Ref Range    GLUCOSE BY METER POCT 202 (H) 70 - 99 mg/dL   Cell count with differential fluid    Narrative    The following orders were created for panel order Cell count with differential fluid.  Procedure                               Abnormality         Status                     ---------                               -----------         ------                     Cell Count Body Fluid[[id number]]        Abnormal            Final result               Differential Body Fluid[[id number]]      Abnormal            Final result                 Please view results for these tests on the individual orders.   Albumin fluid   Result Value Ref Range    Albumin Fluid Source Abdomen     Albumin fluid 1.1 g/dL    Narrative    No reference ranges have been established. This result should be interpreted in the context of the patient's clinical condition and compared to simultaneous measurement in the patient's blood. This is a lab developed test. It has not been cleared or approved by the FDA. FDA clearance is not required for clinical use.   Protein fluid   Result Value Ref Range    Protein Fluid Source Abdomen     Protein Total Fluid 1.9 g/dL    Narrative    No reference ranges have been established. This result should be interpreted in the context of the patient's clinical condition and compared to simultaneous measurement in the patient's blood. This is a lab developed test. It has not been cleared or approved by the FDA. FDA clearance is not required for clinical use.   Ascites Fluid Aerobic Bacterial Culture Routine With Gram Stain    Specimen: Peritoneum; Ascites Fluid   Result Value Ref Range    Gram Stain Result No  organisms seen     Gram Stain Result 3+ WBC seen    Cell Count Body Fluid   Result Value Ref Range    Color Yellow Colorless, Yellow    Clarity Hazy (A) Clear    Cell Count Fluid Source Abdomen     Total Nucleated Cells 826 /uL    Narrative    No reference ranges have been established.  This result  should be interpreted in the context of the patient's clinical condition and   compared to simultaneous measurement in the patient's blood.         Differential Body Fluid   Result Value Ref Range    % Neutrophils 63 %    % Lymphocytes 8 %    % Monocyte/Macrophages 29 %    Absolute Neutrophils, Body Fluid 520.4 (HH)   /uL    Narrative    A polymorphonuclear cell (PMN or alternatively called neutrophil) count >250.0 cells/uL can indicate infection/spontaneous bacterial peritonitis in patients with ascites related to portal hypertension or another appropriate clinical context, and in the absence of other known causes for high PMN count, such as inflammation or peripheral blood contamination. Correlation with other clinical parameters and microbiology results is recommended.  No reference ranges have been established. This result should be interpreted in the context of the patient's clinical condition and compared to simultaneous measurement in the patient's blood.   US Paracentesis without Albumin    Narrative    [facility]  LOCATION: [facility]  DATE: 1/15/2024    PROCEDURE: US-GUIDED THERAPEUTIC PARACENTESIS    INTERVENTIONAL RADIOLOGIST: Franky Jacobs MD    INDICATION: Symptomatic large volume ascites.    CONSENT: The risks, benefits and alternatives of the procedure were  discussed with the patient or patient's representative in detail. All  questions were answered. Informed consent was given to proceed with  the procedure.    MODERATE SEDATION: None.    CONTRAST: None.  ANTIBIOTICS: None.  ADDITIONAL MEDICATIONS: None.    FLUOROSCOPIC TIME: None.  RADIATION DOSE: None.    COMPLICATIONS: No  immediate complications.    STERILE BARRIER TECHNIQUE: Maximum sterile barrier technique was used.  Cutaneous antisepsis was performed at the operative site with  application of 2% chlorhexidine and large sterile drape. Prior to the  procedure, the  and assistant performed hand hygiene and wore  hat, mask, sterile gown, and sterile gloves during the entire  procedure.    PROCEDURE:  A limited ultrasound was performed for localization purposes. Using  real-time ultrasound guidance and 1% lidocaine for local anesthesia, a  7 [place] Yueh catheter was advanced from a lateral right lower  quadrant approach into the peritoneal cavity. The catheter was placed  to suction to allow for drainage. The catheter was withdrawn and  manual pressure applied to the puncture site. A sterile bandage was  applied.     FINDINGS:  The initial ultrasound demonstrates moderate-volume peritoneal  ascites. A total of 2900 mL of clear serous ascitic fluid was removed.        Impression    IMPRESSION:    Successful ultrasound-guided therapeutic paracentesis.    ADRIANA WEINSTEIN MD         SYSTEM ID:  [id number]   Glucose by meter   Result Value Ref Range    GLUCOSE BY METER POCT 190 (H) 70 - 99 mg/dL   Glucose by meter   Result Value Ref Range    GLUCOSE BY METER POCT 186 (H) 70 - 99 mg/dL   Glucose by meter   Result Value Ref Range    GLUCOSE BY METER POCT 185 (H) 70 - 99 mg/dL   Platelet count   Result Value Ref Range    Platelet Count 181 150 - 450 10e3/uL   Creatinine   Result Value Ref Range    Creatinine 2.41 (H) 0.51 - 0.95 mg/dL    GFR Estimate 22 (L) >60 mL/min/1.73m2   Vancomycin level   Result Value Ref Range    Vancomycin 21.0   ug/mL   Glucose by meter   Result Value Ref Range    GLUCOSE BY METER POCT 173 (H) 70 - 99 mg/dL          Medical Decision Making       52 MINUTES SPENT BY ME on the date of service doing chart review, history, exam, documentation & further activities per the note.

## 2024-01-16 NOTE — CONSULTS
"SPIRITUAL HEALTH SERVICES - Consult Note  [facility] Med/Surg 5    Referral Source: [facility] Consult; pt requested follow-up  support.    Pt Lani's spouse Dakotah and daughter Phyllis were at the bedside.  Lani reported that she will likely die in the next couple of days.  She affirmed that she wants to be anointed but wanted \"to think about it\" and added \"I need time to process this.\"  Lani and her family denied any further needs at this time.    Plan: Will see pt and her family 1/18/2024 or sooner per their request, as needs arise.    Narayan Lay M.Div., [facility]  Staff     SHS available 24/7 for emergent requests/referrals, either by paging the on-call  or by entering an ASAP/STAT consult in [facility], which will also page the on-call .   "

## 2024-01-16 NOTE — PLAN OF CARE
Occupational Therapy Discharge Summary    Reason for therapy discharge:    Patient/family request discontinuation of services.    Progress towards therapy goal(s). See goals on Care Plan in [facility] electronic health record for goal details.  Goals not met.  Barriers to achieving goals:   discontinuation of service.    Therapy recommendation(s):    No further therapy is recommended.

## 2024-01-16 NOTE — PLAN OF CARE
End of Shift Summary  For vital signs and complete assessments, please see documentation flowsheets.     Pertinent assessments: Pt disoriented to place, time, and situation. Up with 2 assist, pivot to commode. Incontinent of stool x1. C/o pain in abdomen after paracentesis, tylenol and oxycodone given. BG stable. Poor appetite. Did not want to discuss goals of care or code status with palliative today, will meet again tomorrow.    Major Shift Events: critical value body fluid ANC: 520.4    Treatment Plan: Continue IV fluids, abx, encourage appetite, goals of care conversation

## 2024-01-16 NOTE — PROGRESS NOTES
[facility]  Medicine Progress Note - Hospitalist Service  Date of Admission:  1/10/2024    Assessment & Plan   Summary: Ms. Lani Walker is a 63 year old female with PMHx of metastatic pancreatic cancer (contemplating 3rd line therapy under management of Dr. Melara), who was admitted on 1/10/2024 with confusion and weakness, FTH hypercalcemia. Now with worsening lactcic acidosis and leukocytosis pending further infectious work up, nephrology consulted for worsening STEPHANIE. Pending ongoing clinical improvement. GOC discussion ongoing.        Encephalopathy, likely multifactorial, improving   Hypercalcemia, improved to stable   Leukocytosis, worsening   Anion gap metabolic acidosis, Lactic acidosis, persistent   Presenting with 1 day of confusion and weakness. Workup notable for a calcium of 13.9 (ionized 7.8), WBC of 40.4 with more than 80% neutrophils, no acute pathology on CT head. Received IVF and calcitonin in ER. Mental status had improvement after IVF. Ca responding to calcitonin (13.9->12->11.1->10.7).   WBC has been rising based on review of labs from [facility] with most recent count 23.2 on 1/8/24. Unclear etiology, but possible infection must be considered. UA negative, COVID/flu/RSV negative, US abdomen unrevealing. Patient did report some increased dyspnea. Had recent CT CAP 1/3/24 that did not show specific cause of dyspnea, but does document marked interval progression of disease. TTE was hyperdynamic but also did not reveal cause of dyspnea.   Given elevated WBC and bilirubin, H/O consulted GI for possible cholangitis. GI felt ERCP is unlikely to benefit patient as there is no ductal dilation and could potentially worsen the case as bacteria would be injected up into the bile duct. MRCP was obtained 1/13/24 with findings as below including increased ascites. Patient remained confused and was becoming weaker, despite improvement in calcium. Per her oncology team on 1/13/24, she  had new RUQ pain relative to a week ago and given that WBC and bilirubin continue to rise infectious work up was pursued and CT CAP is now pending 01/14/24.   - generous fluid support with  ml/hr (increased from 100 /hr 01/14/24)   - s/p Calcitonin and Zoledronic acid   - MRCP with no evidence for infection despite persistently worsening WBC, will obtain CT CAP 01/14/24 to evaluate for other infectious source   1.  Hepatomegaly secondary to innumerable hepatic metastases has progressed in the short interval since 01/03/2024. There is a relatively small volume of normal functioning hepatic parenchyma which likely accounts for the rising bilirubin.  2.  Large pancreatic tail mass invades the spleen, left kidney, left adrenal gland, posterior wall proximal stomach, left diaphragmatic adán and occludes the splenic vein.   3.  Severe subcutaneous and intra-abdominal fat edema and moderate volume ascites have all progressed.  - paracentesis (therapeutic and diagnotic) ordered  in setting of enlarging ascites and encephalopathy - however has been on pip-tazo which would cover for SBP, planning to perform on 1/15/24   - CT CAP pending 01/14/24   - sepsis protocol,   ml bolus x2, repeat lactic acid   - adding vancomycin until other infectious process ruled out   - repeat blood cultures pending   - if no infectious source is found, lab abnormalities may be related to progressive disease      STEPHANIE , worsening   Cr now at 1.7 from 1.5. unclear etiology. Has been getting IVF so unlikely hypovolemia. May be related to Zometa. Still having good urine output. If worsening, consider nephrology consult.  - avoid nephrotoxins as able - added vancomycin to zosyn today, however creatinine had been rising prior to this   - nephrology consult today as has continued to worsen despite adequate fluid resuscitation   - AM CMP     Metastatic pancreatic adenocarcinoma    Innumerable metastasis   Follows with Dr. Melara. Initially did  well with FOLFOX, but subsequently recurred. Most recently on gemcitabine and abraxane as of 12/26/23.   - Oncology following, appreciate assistance     Pancreatic insufficiency  PTA on 12-18U lantus. Has not been eating much recently. BG in 100s and has been stable.   - sliding scale insulin     Goals of care  Discussed GOC with patient, daughter, sister, and another family member today. Informed patient that with the rapid progression of cancer, progressive infiltrative liver injury, and now kidney injury patient is exhibiting signs of multiorgan injury/failure related to cancer. Informed patient that she is likely dying, and with continued decline in functional status it is unlikely she will improve enough to receive any further chemotherapy. Discussed that it would be appropriate to start to discuss hospice cares/DNR/DNI, if that patient is open to doing so. Palliative care consulted, who attempted to visit with patient and her family but which was declined at the time. Discussions ongoing.        Diet: Snacks/Supplements Adult: Ensure Enlive; With Meals  Regular Diet Adult    DVT Prophylaxis: Enoxaparin (Lovenox) SQ  Malin Catheter: Not present  Lines: PRESENT      Port a Cath 06/15/22 Single Lumen Right Chest wall-Site Assessment: WDL      Cardiac Monitoring: None  Code Status: Full Code    Sister,  and daughter updated at bedside 01/14/24 - all questions were answered     Clinically Significant Risk Factors           # Hypercalcemia: corrected calcium is >10.1, will monitor as appropriate    # Hypoalbuminemia: Lowest albumin = 2.1 g/dL at 1/15/2024  5:40 AM, will monitor as appropriate      # Acute Kidney Injury, unspecified: based on a >150% or 0.3 mg/dL increase in last creatinine compared to past 90 day average, will monitor renal function  # Hypertension: Noted on problem list        # Overweight: Estimated body mass index is 28.15 kg/m  as calculated from the following:    Height as of this  "encounter: 1.676 m (5' 6\").    Weight as of this encounter: 79.1 kg (174 lb 6.1 oz).             Disposition Plan      Expected Discharge Date: 01/18/2024                    Davin Pena MD  Hospitalist Service  [facility]  Securely message with [employer] (more info)  Text page via [facility] Paging/Directory   ______________________________________________________________________    Interval History   Patient continues to be fatigued and sleepy per family. Demonstrating some intermittent confusion. No new symptoms.    Physical Exam   Temp: 97.4  F (36.3  C) Temp src: Oral BP: 108/76 Pulse: 85   Resp: 16 SpO2: 96 % O2 Device: None (Room air)   Height: 167.6 cm (5' 6\") Weight: 79.1 kg (174 lb 6.1 oz)  Estimated body mass index is 28.15 kg/m  as calculated from the following:    Height as of this encounter: 1.676 m (5' 6\").    Weight as of this encounter: 79.1 kg (174 lb 6.1 oz).    General: Very pleasant female resting comfortably in hospital bed.  Awake, alert, interactive. Family at bedside. Very thin/frail.  HEENT: Normocephalic, atraumatic.  PERRL, EOMI.  Conjunctiva clear, sclerae anicteric.  Mucous membranes moist. Cap on head.  Cardiac: Regular rate and rhythm without murmur, gallop, or rub.   Respiratory: Normal work of breathing.  Clear to auscultation bilaterally without wheezing, rales, or rhonchi.  GI: Normal, active bowel sounds.  Abdomen soft, nontender, nondistended.  : Deferred.  Musculoskeletal: Moving all extremities appropriately.  Skin: No rashes or abrasions on exposed skin.  Neurologic: Intermittently confused and repeating questions.  Psychologic: Appropriate mood and affect.      Medical Decision Making       55 MINUTES SPENT BY ME on the date of service doing chart review, history, exam, documentation & further activities per the note.      Data     I have personally reviewed the following data over the past 24 hrs:    47.5 (H)  \   8.3 (L)   / 219     134 (L) 102 43.8 (H) /  " 190 (H)   4.0 17 (L) 2.02 (H) \     ALT: 47 AST: 128 (H) AP: 239 (H) TBILI: 6.5 (H)   ALB: 2.1 (L) TOT PROTEIN: 4.9 (L) LIPASE: N/A     Procal: N/A CRP: N/A Lactic Acid: 3.4 (H)

## 2024-01-16 NOTE — PLAN OF CARE
Physical Therapy Discharge Summary    Reason for therapy discharge:    Patient/family request discontinuation of services.  Patient awaiting opening in hospice facility.    Progress towards therapy goal(s). See goals on Care Plan in Epic electronic health record for goal details.  Goals not met.  Barriers to achieving goals:   Pt and family requesting discontinuation of therapy, wanting comfort cares only.    Therapy recommendation(s):    No further therapy is recommended.    Goal Outcome Evaluation:

## 2024-01-16 NOTE — CONSULTS
Care Management Follow Up    Length of Stay (days): 6    Expected Discharge Date: 01/18/2024     Concerns to be Addressed:       Patient plan of care discussed at interdisciplinary rounds: Yes    Anticipated Discharge Disposition:       Anticipated Discharge Services:    Anticipated Discharge DME:      Patient/family educated on Medicare website which has current facility and service quality ratings: yes  Education Provided on the Discharge Plan:    Patient/Family in Agreement with the Plan: yes    Referrals Placed by CM/SW:    Private pay costs discussed: Not applicable    Additional Information:    FAISAL met with palliative, pt daughter Atia, and pt nieces to discuss discharge planning. Pt daughter is main point of contact and reports that they would like referrals to [facility], [facility], and [facility] as top choices. Pt daughter is aware and agreeable to the cost of residential facilities. [facility] is top choice. Referrals sent.     Addendum    FAISAL spoke with [facility] in [place] who reports that they do not have any openings at this time but will put pt on a waitlist. They will call FAISAL if there is an opening.     TINO Sam, LGSW  Inpatient Care Coordination  Emergency Room /Pennie Lu LGSW

## 2024-01-16 NOTE — PLAN OF CARE
Goal Outcome Evaluation:       End of Shift Summary  For vital signs and complete assessments, please see documentation flowsheets.     Pertinent assessments:  confused -- family at bedside ---- on bed pan to void -----more alert --- co pain in abd x2 oxy 5mg given with relief--- port to lock---edema from abd  to feet   Major Shift Events  resting well   Treatment Plan: support  Bedside Nurse: Ene Gonzalez RN

## 2024-01-17 NOTE — PLAN OF CARE
Goal Outcome Evaluation:           Overall Patient Progress: no changeOverall Patient Progress: no change    Pertinent assessments: Pt alert at times but confused. Not oob overnight. Used bedpan to void. Denies pain, nausea, and SOB. Port patent; infusing zosyn q6h and NS TKO. Labs drawn from port, flushed, cap changed. On RA. B.    Major Shift Events: none    Treatment Plan: IV ABX, support, pending placement at hospice facility    Bedside Nurse: Munira Parker RN

## 2024-01-17 NOTE — PROGRESS NOTES
NEPHROLOGY    Dr. Pena' note reviewed.     With the plan of non-escalation will sign off. No additional recommendations at this time.     Harvey Danielson MD  Nephrology  [facility]  Cell:[phone]  On [employer]   Pager:[phone]

## 2024-01-17 NOTE — PROGRESS NOTES
[facility]  Medicine Progress Note - Hospitalist Service  Date of Admission:  1/10/2024    Assessment & Plan   Ms. Lani Walker is a 63 year old female with PMHx of metastatic pancreatic cancer (contemplating 3rd line therapy under management of Dr. Melara), who was admitted on 1/10/2024 with confusion and weakness, FTH hypercalcemia. Now with worsening lactcic acidosis and leukocytosis pending further infectious work up, nephrology consulted for worsening STEPHANIE. Ultimately, patient's cancer was found to be progressing rapidly. Goals of care discussion was held with patient, family, oncology and palliative care and patient made decision to pursue hospice upon discharge from the hospital. She does appear to be quite weak today and has not been eating or drinking much, so concern that patient may decline prior to placement. For time being continuing antibiotics for possible SBP, but if continues to decline due to poor nutrition/hydration will discuss with family about moving towards comfort focused cares.    Encephalopathy, likely multifactorial, improving   Hypercalcemia, improved to stable   Leukocytosis, worsening   Anion gap metabolic acidosis, Lactic acidosis, persistent   Presenting with 1 day of confusion and weakness. Workup notable for a calcium of 13.9 (ionized 7.8), WBC of 40.4 with more than 80% neutrophils, no acute pathology on CT head. Received IVF and calcitonin in ER. Mental status had improvement after IVF. Ca responding to calcitonin (13.9->12->11.1->10.7).   WBC has been rising based on review of labs from [facility] with most recent count 23.2 on 1/8/24. Unclear etiology, but possible infection must be considered. UA negative, COVID/flu/RSV negative, US abdomen unrevealing. Patient did report some increased dyspnea. Had recent CT CAP 1/3/24 that did not show specific cause of dyspnea, but does document marked interval progression of disease. TTE was hyperdynamic but also  did not reveal cause of dyspnea.   Given elevated WBC and bilirubin, H/O consulted GI for possible cholangitis. GI felt ERCP is unlikely to benefit patient as there is no ductal dilation and could potentially worsen the case as bacteria would be injected up into the bile duct. MRCP was obtained 1/13/24 with findings as below including increased ascites. Patient remained confused and was becoming weaker, despite improvement in calcium. Per her oncology team on 1/13/24, she had new RUQ pain relative to a week ago and given that WBC and bilirubin continue to rise infectious work up was pursued  - s/p Calcitonin and Zoledronic acid   - MRCP with no evidence for infection despite persistently worsening WBC  1.  Hepatomegaly secondary to innumerable hepatic metastases has progressed in the short interval since 01/03/2024. There is a relatively small volume of normal functioning hepatic parenchyma which likely accounts for the rising bilirubin.  2.  Large pancreatic tail mass invades the spleen, left kidney, left adrenal gland, posterior wall proximal stomach, left diaphragmatic adán and occludes the splenic vein.   3.  Severe subcutaneous and intra-abdominal fat edema and moderate volume ascites have all progressed.  - paracentesis (therapeutic and diagnotic) ordered  in setting of enlarging ascites and encephalopathy - however has been on pip-tazo which would cover for SBP. Performed 1/15/2024  - originally on vanco + zosyn, now on zosyn alone    Possible SBP  Patient with significant ascites likely 2/2 cancer. Paracentesis performed with 826 nucleated cells, 520 of which were neutrophils. Technically meets SBP criteria, but without organisms found on gram stain. Final cultures pending. Continuing zosyn as patient does feels slightly clinically improved, as well.    STEPHANIE , worsening   Cr now at 1.7 from 1.5. unclear etiology. Has been getting IVF so unlikely hypovolemia. May be related to Zometa. Urine output decreased  1/17/2024; unclear if related to poor oral intake or worsening kidney function as we are not checking labs currently  - avoid nephrotoxins as able - added vancomycin to zosyn today, however creatinine had been rising prior to this   - nephrology consult today as has continued to worsen despite adequate fluid resuscitation   - AM CMP   -Not a dialysis candidate    Metastatic pancreatic adenocarcinoma    Innumerable metastasis   Follows with Dr. Melara. Initially did well with FOLFOX, but subsequently recurred. Most recently on gemcitabine and abraxane as of 12/26/23.   - Oncology following, appreciate assistance     Pancreatic insufficiency  Stopping sliding scale insulin and glucose checks in setting of poor oral intake and not requiring insulin >24 hours.    Goals of care  Discussed GOC with patient, daughter, sister, and another family member today. Informed patient that with the rapid progression of cancer, progressive infiltrative liver injury, and now kidney injury patient is exhibiting signs of multiorgan injury/failure related to cancer. Informed patient that she is likely dying, and with continued decline in functional status it is unlikely she will improve enough to receive any further chemotherapy. Discussed that it would be appropriate to start to discuss hospice cares/DNR/DNI, if that patient is open to doing so. Palliative care met with patient and family on 1/16/2024 with decision to discharge to facility for hospice cares. We did not elected to start comfort cares here, however, and will continue all current treatments within reason. Insulin stopped due to patient not eating and not requiring. Stopping lab checks. Continue antibiotics for now, but will discuss comfort measures if patient continues to decline rapidly. Can continue SBP treatment with oral medications at discharge if needed.        Diet: Snacks/Supplements Adult: Ensure Enlive; With Meals  Regular Diet Adult    DVT Prophylaxis: Enoxaparin  "(Lovenox) SQ  Malin Catheter: PRESENT, indication: Retention  Lines: PRESENT      Port a Cath 06/15/22 Single Lumen Right Chest wall-Site Assessment: WDL      Cardiac Monitoring: None  Code Status: No CPR- Do NOT Intubate    Sister,  and daughter updated at bedside 01/14/24 - all questions were answered     Clinically Significant Risk Factors              # Hypoalbuminemia: Lowest albumin = 2.1 g/dL at 1/15/2024  5:40 AM, will monitor as appropriate      # Acute Kidney Injury, unspecified: based on a >150% or 0.3 mg/dL increase in last creatinine compared to past 90 day average, will monitor renal function  # Hypertension: Noted on problem list        # Overweight: Estimated body mass index is 28.15 kg/m  as calculated from the following:    Height as of this encounter: 1.676 m (5' 6\").    Weight as of this encounter: 79.1 kg (174 lb 6.1 oz).             Disposition Plan      Expected Discharge Date: 01/18/2024,  3:00 PM                  Davin Pena MD  Hospitalist Service  [facility]  Securely message with [employer] (more info)  Text page via [facility] Paging/Directory   ______________________________________________________________________    Interval History    Reports feeling increasingly tired today. Some abdominal pain.     Physical Exam   Temp: 97.6  F (36.4  C) Temp src: Oral BP: 101/69 Pulse: 93   Resp: 16 SpO2: 96 % O2 Device: None (Room air)   Height: 167.6 cm (5' 6\") Weight: 79.1 kg (174 lb 6.1 oz)  Estimated body mass index is 28.15 kg/m  as calculated from the following:    Height as of this encounter: 1.676 m (5' 6\").    Weight as of this encounter: 79.1 kg (174 lb 6.1 oz).    General: Very pleasant female resting comfortably in hospital bed.  Awake, alert, interactive but mildly confused and sleeping intermittently. Family at bedside. Very thin/frail.  HEENT: Normocephalic, atraumatic.  PERRL, EOMI.  Conjunctiva clear, sclerae anicteric.  Mucous membranes moist. Cap on " head.  Cardiac: Regular rate and rhythm without murmur, gallop, or rub.   Respiratory: Normal work of breathing.  Clear to auscultation bilaterally without wheezing, rales, or rhonchi.  GI: Normal, active bowel sounds.  Abdomen soft, nontender, nondistended.  : Deferred.  Musculoskeletal: Moving all extremities appropriately.  Skin: No rashes or abrasions on exposed skin.  Neurologic: Intermittently confused and repeating questions.  Psychologic: Appropriate mood and affect.      Medical Decision Making       45 MINUTES SPENT BY ME on the date of service doing chart review, history, exam, documentation & further activities per the note.      Data     I have personally reviewed the following data over the past 24 hrs:    N/A  \   N/A   / 173     N/A N/A N/A /  151 (H)   N/A N/A N/A \

## 2024-01-17 NOTE — PLAN OF CARE
Goal Outcome Evaluation:             To Do:  End of Shift Summary  For vital signs and complete assessments, please see documentation flowsheets.     Pertinent assessments:M ore alert but confused ----  co of need to urinate tried bed pan and lift to commode with no results-- bladder scanned --- navarro placed with concentrated returns ---requesting pain meds Oxy 5mg given x3----     Major Shift Events family at bedside   Treatment Plan: monitor  Bedside Nurse: Ene Gonzalez RN

## 2024-01-17 NOTE — PROGRESS NOTES
Care Management Follow Up    Length of Stay (days): 7    Expected Discharge Date: 01/19/2024     Concerns to be Addressed:       Patient plan of care discussed at interdisciplinary rounds: Yes    Anticipated Discharge Disposition:  residential hospice      Anticipated Discharge Services:  hospice   Anticipated Discharge DME:      Patient/family educated on Medicare website which has current facility and service quality ratings: yes  Education Provided on the Discharge Plan:    Patient/Family in Agreement with the Plan: yes    Referrals Placed by CM/SW:  Residential hospice   Private pay costs discussed: insurance costs out of pocket expenses    Additional Information:  [facility] spoke with [facility]. They do not have any beds. FAISAL was told to call back on Friday. [facility] for [facility] requesting a call back.     TINO Peña, LGFAISAL  Emergency Room   Please contact the FAISAL on the floor in which the patient is staying for any questions or concerns

## 2024-01-17 NOTE — PROGRESS NOTES
CLINICAL NUTRITION SERVICES - REASSESSMENT NOTE      Recommendations:   - Diet per MD.  - Ok for prn Ensure (previously discussed w/ nursing).    - Following peripherally at this time.  Please formally re-consult or page if needs arise.  Per review of chart and discussion w/ team during rounds, patient is not formally on comfort cares and is discharging on hospice.       Malnutrition Diagnosis: Unable to determine as patient/family previously declined NFPE, refer to RD assessment         EVALUATION OF PROGRESS TOWARD GOALS   Diet: Regular, ok for prn Ensure    Intake/Tolerance: Please refer to RD assessment previously completed, at that time family/patient declined nutrition assessment.  As such, RD team has been following peripherally since admission and while there have been ongoing goals of care discussions (refer to palliative and MD notes, patient is not formally on comfort cares, is discharging on hospice).  Only documentation in flowsheets is 0% (x1).        ASSESSED NUTRITION NEEDS PER APPROVED PRACTICE GUIDELINES:     Dosing Weight 76.1 kg  Estimated Energy Needs: 25-30+  Justification: minimum maintenance, as realistic w/ ongoing goals of care  Estimated Protein Needs: >/=1.2 pro/Kg  Justification: preservation of LBM, as realistic w/ ongoing goals of care  Estimated Fluid Needs: per MD or 1 mL/kcal      NEW FINDINGS:   Medications: Reviewed.    Labs: Reviewed.    Stooling: Patterns reviewed.    Weight:  Vitals:    01/10/24 2121 01/11/24 1407 01/13/24 0656   Weight: 73 kg (161 lb) 76.1 kg (167 lb 12.3 oz) 79.1 kg (174 lb 6.1 oz)       Previous goals:   Consume % of 2-3 meals daily when able. (Pending GOC)    Evaluation: Unable to evaluate, no flowsheet recordings  Consume 1-2 Ensures daily to support intake (when able) (Pending GOC)  Evaluation: Not met (no Ensure ordered via meal system)    Previous nutrition diagnosis:   Predicted inadequate nutrient intake related to fatigue and recent cancer  treatments as evidenced by previous RDN notes, health touch records, and pt's clinical status.    Evaluation: No change    Current nutrition diagnosis:  Predicted suboptimal nutrient intake (energy/protein) related to potential for decline in PO intakes acutely w/ lethargy and confusion documented, plans for hospice at discharge.    INTERVENTIONS  Recommendations / Nutrition Prescription  See above.    Implementation  Collaboration and Referral of Nutrition care: Discussed POC with team during rounds.      Current goals:  Ongoing goals of care discussions during timeframe.    MONITORING AND EVALUATION  Progress towards goals will be monitored and evaluated per protocol and practice guidelines.      Sherri Sagastume RDN, LD  Clinical Dietitian  3rd floor/ICU: [phone]  All other floors: [phone]  Weekend/holiday: [phone]  Office: [phone]

## 2024-01-18 NOTE — PROGRESS NOTES
Care Management Follow Up    Length of Stay (days): 8    Expected Discharge Date: 01/18/2024     Concerns to be Addressed:       Patient plan of care discussed at interdisciplinary rounds: Yes    Anticipated Discharge Disposition:  Residential Hospice     Anticipated Discharge Services:    Anticipated Discharge DME:      Patient/family educated on Medicare website which has current facility and service quality ratings: yes  Education Provided on the Discharge Plan:    Patient/Family in Agreement with the Plan: yes    Referrals Placed by CM/SW:  Residential Hospice      Additional Information:  SW called [facility]. They are still reviewing but no anticipated beds until next week. They will call SW back.     [facility] told SW yesterday to call back on Friday.     [facility] not have any openings on 1/16 and patient is on the waitlist.     SW continues to follow.     Addendum 1101: Updated patient and family at bedside     TINO Peña, ERICK  Emergency Room   Please contact the SW on the floor in which the patient is staying for any questions or concerns

## 2024-01-18 NOTE — PROGRESS NOTES
PALLIATIVE CARE PROGRESS NOTE  [facility]     Patient Name: Lani Walker  Date of Admission: 1/10/2024   Today the patient was seen for:   On going goals of care  Decisional and emotional support     Recommendations & Counseling     GOALS OF CARE:   Restorative with limits  Hospice on discharge- needs placement  Discussed comfort cares with daughter,  she will talk with her dad and let the team know. patient was not decisional or able to participate in the conversation today     ADVANCE CARE PLANNING:  No health care directive on file. Per  informed consent policy, next of kin should be involved if patient becomes unable.  There is no POLST form on file, defer to patient and/or next of kin for decisions   Code status: No CPR- Do NOT Intubate     MEDICAL MANAGEMENT:   #Pain   Opioids: oxycodone (Roxicodone) IR 5-10 mg every 4 hours PRN  Acetaminophen (Tylenol), PRN- dose reduced due to liver functions  Repositioning as able  Draining of abdomen for comfort- consider pleurx drain     #Delirium  Avoid benzodiazepines, antihistamines, anticholinergics if able.  Lights on and blinds open during the day.  Reorient frequently.  Lights & TV off during the night.  Promote normal circadian rhythm.  Limit sensory deprivation - utilize hearing aids, glasses, etc.  Frequently assess basic needs such as temperature, elimination, thirst/hunger, pain  Redirection as able     #Dyspnea  Complementary therapies  Repositioning for comfort  Monitor respiratory status closely     #General Weakness/Debility   Appreciate the help of staff for ADLs per plan of care  Appreciate the input of therapies for discharge recommendations  Family is interested in equipment for at home, hospital bed, OBT, commode  Family is looking into hiring help in the home     PSYCHOSOCIAL/SPIRITUAL SUPPORT:  Family   [place] community: [employer] would appreciate  for emotional support    Palliative Care will continue to follow.  Thank you for the consult and allowing us to aid in the care of Lani Walker.    These recommendations have been discussed with medical team.    Felicia Haywood NP  Securely message with [employer] (more info)  Text page via [facility] Paging/Directory      Assessments          Lani Walker is a 63 year old female with a past medical history of  metastatic pancreatic cancer-follows with Dr. Melara at [facility] who presented on 1/10/2024 with increase confusion and weakness.                 Interval History:     Per patient or family/caregivers today:  Met with patient and daughter. Patient was sleeping throughout most of the conversation. Discussed comfort cares and what that would look like for the patient. She understands. She would like to talk with her dad a little today and will let the team know if they would like to transition to comfort cares.             Review of Systems:     Besides above, an additional  system ROS was reviewed and is unremarkable               Physical Exam:   Temp: 97.9  F (36.6  C) Temp src: Oral Temp  Min: 97.6  F (36.4  C)  Max: 97.9  F (36.6  C) BP: 98/56 Pulse: 91   Resp: 16 SpO2: 95 % O2 Device: None (Room air)    Vital Signs with Ranges  Temp:  [97.6  F (36.4  C)-97.9  F (36.6  C)] 97.9  F (36.6  C)  Pulse:  [88-96] 91  Resp:  [16] 16  BP: ()/(56-67) 98/56  SpO2:  [95 %-98 %] 95 %  174 lbs 6.14 oz    EXAM:  Constitutional: no distress, flat effect  Cardiovascular: negative  Respiratory: negative  Psychiatric: flat affect  Abdomen: distended, non tender with palpation  Pain: complains of abdominal pain             Current Problem List:   Principal Problem:    Leukocytosis, unspecified type  Active Problems:    Hypercalcemia    Metabolic encephalopathy    Acute kidney failure, unspecified (H)      No Known Allergies         Data Reviewed:       Results for orders placed or performed during the hospital encounter of 01/10/24 (from the past 24 hour(s))   Glucose by meter    Result Value Ref Range    GLUCOSE BY METER POCT 151 (H) 70 - 99 mg/dL          Medical Decision Making       45 MINUTES SPENT BY ME on the date of service doing chart review, history, exam, documentation & further activities per the note.

## 2024-01-18 NOTE — CONSULTS
"SPIRITUAL HEALTH SERVICES - Consutl Note  RH Med/Surg 5    Referral Source: [facility] consult; follow-up encounter to assess pt's spiritual needs after she had expressed a desire to be anointed earlier in her admission.    Pt Lani's daughter Phyllis was a the bedside.  Lani presented as being confused and had difficulty tracking the conversation.  Phyllis reported that she is working with her cousin to contact a  who knows Lani to request anointing.    Phyllis reported that she is coping with her mother's decline and affirmed that she has \"lots of people\" who are supportive.    Plan: Reviewed with pt's daughter how she can request further  support.  This author and other chaplains remain available per pt/family request.     Narayan Lay M.Div., [facility]  Staff     SHS available 24/7 for emergent requests/referrals, either by paging the on-call  or by entering an ASAP/STAT consult in [facility], which will also page the on-call .   "

## 2024-01-18 NOTE — PROGRESS NOTES
Oncology Chart Check:    I visited Lani in the hospital today.  No new symptoms but she complained of nausea this morning.  Awaiting discharge to hospice.    We will continue to follow along    Pramod Melara M.D.  [facility]  [phone]

## 2024-01-18 NOTE — PROGRESS NOTES
[facility]  Medicine Progress Note - Hospitalist Service  Date of Admission:  1/10/2024    Assessment & Plan   Ms. Lani Walker is a 63 year old female with PMHx of metastatic pancreatic cancer (contemplating 3rd line therapy under management of Dr. Melara), who was admitted on 1/10/2024 with confusion and weakness, FTH hypercalcemia. Now with worsening lactcic acidosis and leukocytosis pending further infectious work up, nephrology consulted for worsening STEPHANIE. Ultimately, patient's cancer was found to be progressing rapidly. Goals of care discussion was held with patient, family, oncology and palliative care and patient made decision to pursue hospice upon discharge from the hospital. She does appear to be quite weak today and has not been eating or drinking much, so concern that patient may decline prior to placement. For time being continuing antibiotics for possible SBP, but if continues to decline due to poor nutrition/hydration will discuss with family about moving towards comfort focused cares. Does appear to be declining rather quickly.    Encephalopathy, likely multifactorial, improving   Hypercalcemia, improved to stable   Leukocytosis, worsening   Anion gap metabolic acidosis, Lactic acidosis, persistent   Presenting with 1 day of confusion and weakness. Workup notable for a calcium of 13.9 (ionized 7.8), WBC of 40.4 with more than 80% neutrophils, no acute pathology on CT head. Received IVF and calcitonin in ER. Mental status had improvement after IVF. Ca responding to calcitonin (13.9->12->11.1->10.7).   WBC has been rising based on review of labs from [place] [facility] with most recent count 23.2 on 1/8/24. Unclear etiology, but possible infection must be considered. UA negative, COVID/flu/RSV negative, US abdomen unrevealing. Patient did report some increased dyspnea. Had recent CT CAP 1/3/24 that did not show specific cause of dyspnea, but does document marked interval  progression of disease. TTE was hyperdynamic but also did not reveal cause of dyspnea.   Given elevated WBC and bilirubin, H/O consulted GI for possible cholangitis. GI felt ERCP is unlikely to benefit patient as there is no ductal dilation and could potentially worsen the case as bacteria would be injected up into the bile duct. MRCP was obtained 1/13/24 with findings as below including increased ascites. Patient remained confused and was becoming weaker, despite improvement in calcium. Per her oncology team on 1/13/24, she had new RUQ pain relative to a week ago and given that WBC and bilirubin continue to rise infectious work up was pursued  - s/p Calcitonin and Zoledronic acid   - MRCP with no evidence for infection despite persistently worsening WBC  1.  Hepatomegaly secondary to innumerable hepatic metastases has progressed in the short interval since 01/03/2024. There is a relatively small volume of normal functioning hepatic parenchyma which likely accounts for the rising bilirubin.  2.  Large pancreatic tail mass invades the spleen, left kidney, left adrenal gland, posterior wall proximal stomach, left diaphragmatic adán and occludes the splenic vein.   3.  Severe subcutaneous and intra-abdominal fat edema and moderate volume ascites have all progressed.  - paracentesis 1/15/2024 (therapeutic and diagnotic) ordered  in setting of enlarging ascites and encephalopathy - however has been on pip-tazo which would cover for SBP.   - originally on vanco + zosyn; now off antibiotics    Possible SBP  Patient with significant ascites likely 2/2 cancer. Paracentesis performed with 826 nucleated cells, 520 of which were neutrophils. Technically meets SBP criteria, but without organisms found on gram stain. Final cultures pending. Continuing zosyn as patient does feels slightly clinically improved, as well. Transitioned to augmentin for total 14 days of coverage, or stop sooner if decides on comfort focused cares in  hospital.    STEPHANIE , worsening   Cr now at 1.7 from 1.5. unclear etiology. Has been getting IVF so unlikely hypovolemia. May be related to Zometa. Urine output decreased 1/17/2024; unclear if related to poor oral intake or worsening kidney function as we are not checking labs currently  - avoid nephrotoxins as able - added vancomycin to zosyn today, however creatinine had been rising prior to this   - nephrology consult today as has continued to worsen despite adequate fluid resuscitation   - AM CMP   -Not a dialysis candidate  -No longer checking as not advancing cares at this point    Metastatic pancreatic adenocarcinoma    Innumerable metastasis   Follows with Dr. Melara. Initially did well with FOLFOX, but subsequently recurred. Most recently on gemcitabine and abraxane as of 12/26/23.   - Oncology following, appreciate assistance     Pancreatic insufficiency  Stopping sliding scale insulin and glucose checks in setting of poor oral intake and not requiring insulin >24 hours.    Goals of care  Discussed GOC with patient, daughter, sister, and another family member today. Informed patient that with the rapid progression of cancer, progressive infiltrative liver injury, and now kidney injury patient is exhibiting signs of multiorgan injury/failure related to cancer. Informed patient that she is likely dying, and with continued decline in functional status it is unlikely she will improve enough to receive any further chemotherapy. Discussed that it would be appropriate to start to discuss hospice cares/DNR/DNI, if that patient is open to doing so. Palliative care met with patient and family on 1/16/2024 with decision to discharge to facility for hospice cares. We did not elected to start comfort cares here, however, and will continue all current treatments within reason. Insulin stopped due to patient not eating and not requiring. Stopping lab checks. Continue antibiotics for now, but will discuss comfort measures if  "patient continues to decline rapidly. Can continue SBP treatment with oral medications at discharge if needed.        Diet: Snacks/Supplements Adult: Ensure Enlive; With Meals  Regular Diet Adult    DVT Prophylaxis: Enoxaparin (Lovenox) SQ  Malin Catheter: PRESENT, indication: End of Life  Lines: PRESENT      Port a Cath 06/15/22 Single Lumen Right Chest wall-Site Assessment: WDL      Cardiac Monitoring: None  Code Status: No CPR- Do NOT Intubate    Sister,  and daughter updated at bedside 01/14/24 - all questions were answered     Clinically Significant Risk Factors              # Hypoalbuminemia: Lowest albumin = 2.1 g/dL at 1/15/2024  5:40 AM, will monitor as appropriate       # Hypertension: Noted on problem list        # Overweight: Estimated body mass index is 28.15 kg/m  as calculated from the following:    Height as of this encounter: 1.676 m (5' 6\").    Weight as of this encounter: 79.1 kg (174 lb 6.1 oz).             Disposition Plan      Expected Discharge Date: 01/19/2024,  3:00 PM                  Davin Pena MD  Hospitalist Service  [facility]  Securely message with [employer] (more info)  Text page via [employer] Paging/Directory   ______________________________________________________________________    Interval History    Reports feeling increasingly tired today. Has been sleeping more today. Not taking much by way of oral intake. No pain reported.    Physical Exam   Temp: 97.9  F (36.6  C) Temp src: Oral BP: 98/56 Pulse: 91   Resp: 16 SpO2: 95 % O2 Device: None (Room air)   Height: 167.6 cm (5' 6\") Weight: 79.1 kg (174 lb 6.1 oz)  Estimated body mass index is 28.15 kg/m  as calculated from the following:    Height as of this encounter: 1.676 m (5' 6\").    Weight as of this encounter: 79.1 kg (174 lb 6.1 oz).    General: Very pleasant female resting comfortably in hospital bed.  Awake, alert, interactive but mildly confused and sleeping intermittently. Family at bedside. Very " thin/frail.  Musculoskeletal: Moving all extremities appropriately.  Skin: No rashes or abrasions on exposed skin.  Psychologic: Appropriate mood and affect.      Medical Decision Making       45 MINUTES SPENT BY ME on the date of service doing chart review, history, exam, documentation & further activities per the note.      Data

## 2024-01-18 NOTE — PLAN OF CARE
Goal Outcome Evaluation:       To Do:  End of Shift Summary  For vital signs and complete assessments, please see documentation flowsheets.     Pertinent assessments: Less alert increasingly lethargic----   taking in sips of  liquids -- given Oxy 5 mg x2----navarro draining asad urine ---  body edema +3 ----  jaundice increasing --- repositioned x2-- co nausea x2 with Zofran given  Major Shift Events refuses  mouth care---   Treatment Plan: support  Bedside Nurse: Ene Gonzalez RN

## 2024-01-18 NOTE — PROGRESS NOTES
Oncology/Hematology Follow Up Note:    Assessment and Plan:  #1 Metastatic pancreatic cancer  - Recent disease progression on 2nd line therapy  - Rapid functional decline over the past few weeks makes her ineligible to receive further systemic therapy  - Patient has decided to pursue hospice     PLAN:  -  working on hospice placement  - We will continue to follow along    Pramod Melara M.D.  [facility]  [phone]    Subjective:    She was alert but intermittently confused when I saw her this morning.  Pain was well controlled.  No appetite.  Has not eaten much.      Labs:  All labs reviewed    CBC  Recent Labs   Lab 01/17/24  0556 01/16/24  0602 01/15/24  0540 01/14/24  0538 01/13/24  0535   WBC  --   --  47.5* 46.9* 42.8*   HGB  --   --  8.3* 8.5* 8.4*   MCV  --   --  96 95 96    181 219 259 306  306       CMP  Recent Labs   Lab 01/17/24  1349 01/16/24  2320 01/16/24  1722 01/16/24  1253 01/16/24  0854 01/16/24  0602 01/15/24  0857 01/15/24  0540 01/14/24  1210 01/14/24  0538 01/13/24  1707 01/13/24  0535 01/12/24  0549   NA  --   --   --   --   --   --   --  134*  --  134*  --  136 135   POTASSIUM  --   --   --   --   --   --   --  4.0  --  4.3  --  4.6 4.5   CHLORIDE  --   --   --   --   --   --   --  102  --  103  --  104 102   CO2  --   --   --   --   --   --   --  17*  --  15*  --  17* 20*   ANIONGAP  --   --   --   --   --   --   --  15  --  16*  --  15 13   * 187* 197* 168*   < >  --    < > 179*   < > 131*   < > 124* 103*   BUN  --   --   --   --   --   --   --  43.8*  --  38.4*  --  29.8* 21.1   CR  --   --   --   --   --  2.41*  --  2.02*  --  1.77*  --  1.47*  1.50* 1.16*   GFRESTIMATED  --   --   --   --   --  22*  --  27*  --  32*  --  40*  39* 53*   SKINNY  --   --   --   --   --   --   --  9.4  --  10.1  --  10.7* 11.1*   MAG  --   --   --   --   --   --   --  1.9  --   --   --   --   --    PHOS  --   --   --   --   --   --   --  2.3*  --   --   --   --   --   "  PROTTOTAL  --   --   --   --   --   --   --  4.9*  --  5.0*  --  5.2* 5.1*   ALBUMIN  --   --   --   --   --   --   --  2.1*  --  2.2*  --  2.4* 2.3*   BILITOTAL  --   --   --   --   --   --   --  6.5*  --  5.9*  --  5.2* 4.8*   ALKPHOS  --   --   --   --   --   --   --  239*  --  223*  --  214* 221*   AST  --   --   --   --   --   --   --  128*  --  153*  --  169* 150*   ALT  --   --   --   --   --   --   --  47  --  48  --  48 43    < > = values in this interval not displayed.       INR  Recent Labs   Lab 01/12/24  0937   INR 1.39*       Blood CultureNo results for input(s): \"CULT\" in the last 168 hours.      Pramod Melara MD  [facility]  1/17/2024 8:58 PM        "

## 2024-01-18 NOTE — PLAN OF CARE
Notified provider about indwelling navarro catheter discussed removal or continued need.    Did provider choose to remove indwelling navarro catheter? YES    Provider's navarro indication for keeping indwelling navarro catheter: Indication for continued use: Retention    Is there an order for indwelling navarro catheter? YES    *If there is a plan to keep navarro catheter in place at discharge daily notification with provider is not necessary, but please add a notation in the treatment team sticky note that the patient will be discharging with the catheter.

## 2024-01-18 NOTE — PLAN OF CARE
"Goal Outcome Evaluation:    Pertinent assessments: [facility] cares [id number]. Pt drowsy, but restless. Opens eyes to voice. Oriented to self only. Patient appears unable to communicated own needs; often tried to say something, but then says \"never mind, I am fine\". Conversation distracted; switches subject. Denies SOB; breathing unlabored. LS dim/clear. Vitals stable. Malin with small urine output. Repositioned with assist of 2; patient not changing positions herself. Slept on and off.                         "

## 2024-01-19 NOTE — DISCHARGE SUMMARY
[facility]    Death Summary - Hospitalist Service     Date of Admission:  1/10/2024  Date of Death: 2024  Discharging Provider: Davin Pena MD    Discharge Diagnoses   Metastatic pancreatic adenocarcinoma    Innumerable metastasis   Encephalopathy, likely multifactorial, improving   Hypercalcemia, improved to stable   Leukocytosis, worsening   Anion gap metabolic acidosis, Lactic acidosis, persistent   Possible SBP   STEPHANIE   Pancreatic insufficiency   Hypoalbuminemia   Hypertension   Overweight     Cause of death: Metastatic Pancreatic Adenocarcinoma    Hospital Course   Ms. Lani Walker is a 63 year old female with PMHx of metastatic pancreatic cancer (contemplating 3rd line therapy under management of Dr. Melara), who was admitted on 1/10/2024 with confusion and weakness, FTH hypercalcemia. Now with worsening lactcic acidosis and leukocytosis pending further infectious work up, nephrology consulted for worsening SETPHANIE. Ultimately, patient's cancer was found to be progressing rapidly. Goals of care discussion was held with patient, family, oncology and palliative care and patient made decision to pursue hospice upon discharge from the hospital. Unfortunately, patient declined rather rapidly due to progressive cancer resulting in multiorgan failure and poor oral intake. She  the morning of 2024.      Davin Pena MD  [facility]  ______________________________________________________________________      Significant Results and Procedures   Most Recent 3 CBC's:  Recent Labs   Lab Test 24  0556 24  0602 01/15/24  0540 24  0538 24  0535   WBC  --   --  47.5* 46.9* 42.8*   HGB  --   --  8.3* 8.5* 8.4*   MCV  --   --  96 95 96    181 219 259 306  306     Most Recent 3 BMP's:  Recent Labs   Lab Test 24  1349 24  2320 24  1722 24  0854 24  0602 01/15/24  0857 01/15/24  0540 24  1210 24  0538  01/13/24  1707 01/13/24  0535   NA  --   --   --   --   --   --  134*  --  134*  --  136   POTASSIUM  --   --   --   --   --   --  4.0  --  4.3  --  4.6   CHLORIDE  --   --   --   --   --   --  102  --  103  --  104   CO2  --   --   --   --   --   --  17*  --  15*  --  17*   BUN  --   --   --   --   --   --  43.8*  --  38.4*  --  29.8*   CR  --   --   --   --  2.41*  --  2.02*  --  1.77*  --  1.47*  1.50*   ANIONGAP  --   --   --   --   --   --  15  --  16*  --  15   SKINNY  --   --   --   --   --   --  9.4  --  10.1  --  10.7*   * 187* 197*   < >  --    < > 179*   < > 131*   < > 124*    < > = values in this interval not displayed.     Most Recent 2 LFT's:  Recent Labs   Lab Test 01/15/24  0540 01/14/24  0538   * 153*   ALT 47 48   ALKPHOS 239* 223*   BILITOTAL 6.5* 5.9*   ,   Results for orders placed or performed during the hospital encounter of 01/10/24   CT Head w/o Contrast    Narrative    EXAM: CT HEAD W/O CONTRAST  LOCATION: [facility]  DATE: 1/10/2024    INDICATION: Altered mental status  COMPARISON: None.  TECHNIQUE: Routine CT Head without IV contrast. Multiplanar reformats. Dose reduction techniques were used.    FINDINGS:  INTRACRANIAL CONTENTS: No intracranial hemorrhage, extraaxial collection, or mass effect.  No CT evidence of acute infarct. Normal parenchymal attenuation for age. Normal ventricles and sulci for age.     VISUALIZED ORBITS/SINUSES/MASTOIDS: No intraorbital abnormality. No significant paranasal sinus mucosal disease. No middle ear or mastoid effusion.    BONES/SOFT TISSUES: No acute abnormality.      Impression    IMPRESSION:  1.  Mild age-related changes without acute intracranial abnormality.   US Abdomen Limited (RUQ)    Narrative    EXAM: US ABDOMEN LIMITED  LOCATION: [facility]  DATE: 1/10/2024    INDICATION: Elevated bilirubin.  COMPARISON: CT abdomen pelvis 01/03/2024.  TECHNIQUE: Limited abdominal  ultrasound.    FINDINGS:    GALLBLADDER: Gallbladder contains sludge. Gallbladder wall is mildly thickened, measuring up to 4 mm. No discrete gallstones. No pericholecystic fluid. Sonographic Hinds's sign is negative.    BILE DUCTS: No intrahepatic biliary ductal dilation. Common bile duct is not well visualized.    LIVER: Liver is enlarged, measuring up to 20.5 cm. There are multiple heterogeneous metastases throughout the liver, better visualized on the recent CT. Main portal vein is patent with hepatopedal flow.    RIGHT KIDNEY: No hydronephrosis.    PANCREAS: Visualized portion of the head is normal. Body and tail is obscured by overlying bowel gas.    Small amount of ascites within the right upper quadrant.      Impression    IMPRESSION:    1.  Numerous metastases within both hepatic lobes, better visualized on the recent CT.    2.  No intrahepatic biliary ductal dilation. Common bile duct is not well visualized.    3.  Gallbladder contains sludge. There is mild gallbladder wall thickening, which is nonspecific in the setting of liver disease and ascites. No specific signs of acute cholecystitis.    4.  Small amount of ascites in the right upper quadrant.   MR Abdomen MRCP w/o & w Contrast    Narrative    EXAM: MR ABDOMEN MRCP W/O and W CONTRAST  LOCATION: [facility]  DATE: 1/13/2024    INDICATION: Rising bilirubin, leukocytosis  COMPARISON: 01/10/2024 RUQ US and CTs CAP 1/3/2024 and 10/31/2023  TECHNIQUE: Routine MR liver/pancreas protocol including axial and coronal MRCP sequences. 2D and 3D reconstruction performed by MR technologist including MIP reconstruction and slab cholangiograms. If performed with contrast, additional dynamic T1 post   IV contrast images.  CONTRAST: 8mL Gadavist     FINDINGS:     MRCP: No bile duct dilatation. Mild gallbladder wall edema. No gallstones.    LIVER: Hepatomegaly secondary to innumerable hepatic metastases with relatively small volume of normal  functioning hepatic parenchyma. Craniocaudal dimension right hepatic lobe has increased from 21 cm to 23 cm in the short interval since the 01/03/2024   CT.    PANCREAS: The 8.5 x 3.5 x 8.5 cm pancreatic tail mass invades the spleen, the left kidney, the left adrenal gland, the posterior wall of the proximal stomach, the left diaphragmatic adán and occludes the splenic vein.    ADDITIONAL FINDINGS: Severe generalized subcutaneous edema, moderate volume ascites, and generalized mesenteric edema have increased since 01/03/2024.      Impression    IMPRESSION:  1.  Hepatomegaly secondary to innumerable hepatic metastases has progressed in the short interval since 01/03/2024. There is a relatively small volume of normal functioning hepatic parenchyma which likely accounts for the rising bilirubin.  2.  Large pancreatic tail mass invades the spleen, left kidney, left adrenal gland, posterior wall proximal stomach, left diaphragmatic adán and occludes the splenic vein.   3.  Severe subcutaneous and intra-abdominal fat edema and moderate volume ascites have all progressed.   CT Chest Abdomen Pelvis w/o Contrast    Narrative    EXAM: CT CHEST ABDOMEN PELVIS W/O CONTRAST  LOCATION: [facility]  DATE: 1/14/2024    INDICATION: Persistent encephalopathy, looking for possible infection source.  COMPARISON: MRI 01/13/2024, CT 01/03/2024  TECHNIQUE: CT scan of the chest, abdomen, and pelvis was performed without IV contrast. Multiplanar reformats were obtained. Dose reduction techniques were used.   CONTRAST: None.    FINDINGS:   LUNGS AND PLEURA: Small left and trace right pleural effusions are new from prior CT but stable from more recent MRI. Mild adjacent compressive atelectasis. Mild emphysema. A few subpleural nodules anteriorly similar to previous, for example adjacent to   the anterior mediastinum on image 133 is a 7 mm nodule. Left upper lobe anteriorly image 96 there is a 6 mm  nodule.    MEDIASTINUM/AXILLAE: Right IJ port. Mildly enlarged right paratracheal lymph node near the thoracic inlet is unchanged. Paraesophageal lymphadenopathy also similar to previous.    CORONARY ARTERY CALCIFICATION: Mild.    HEPATOBILIARY: Numerous metastases throughout the liver, similar to previous.    PANCREAS: Large pancreatic tail mass measures 9.5 cm in diameter, and invades the adjacent spleen, adrenal gland, left kidney and stomach.    SPLEEN: Directly invaded by the large pancreatic tail mass as above.    ADRENAL GLANDS: Normal right adrenal gland.    KIDNEYS/BLADDER: Normal right kidney and bladder.    BOWEL: Normal.    LYMPH NODES: Enlarged upper abdominal lymph nodes are stable.    VASCULATURE: Unremarkable.    PELVIC ORGANS: Mixed solid and cystic right adnexal mass measures 5 cm in diameter, not appreciably changed. Small to moderate ascites unchanged.    MUSCULOSKELETAL: Normal.      Impression    IMPRESSION:  1.  Diffuse metastatic disease, not appreciably changed from prior examinations.  2.  No acute infectious or inflammatory processes.   US Paracentesis without Albumin    Narrative    [facility]  LOCATION: [facility]  DATE: 1/15/2024    PROCEDURE: US-GUIDED THERAPEUTIC PARACENTESIS    INTERVENTIONAL RADIOLOGIST: Franky Jacobs MD    INDICATION: Symptomatic large volume ascites.    CONSENT: The risks, benefits and alternatives of the procedure were  discussed with the patient or patient's representative in detail. All  questions were answered. Informed consent was given to proceed with  the procedure.    MODERATE SEDATION: None.    CONTRAST: None.  ANTIBIOTICS: None.  ADDITIONAL MEDICATIONS: None.    FLUOROSCOPIC TIME: None.  RADIATION DOSE: None.    COMPLICATIONS: No immediate complications.    STERILE BARRIER TECHNIQUE: Maximum sterile barrier technique was used.  Cutaneous antisepsis was performed at the operative site with  application of 2% chlorhexidine and large  sterile drape. Prior to the  procedure, the  and assistant performed hand hygiene and wore  hat, mask, sterile gown, and sterile gloves during the entire  procedure.    PROCEDURE:  A limited ultrasound was performed for localization purposes. Using  real-time ultrasound guidance and 1% lidocaine for local anesthesia, a  7 Bh[place] Yueh catheter was advanced from a lateral right lower  quadrant approach into the peritoneal cavity. The catheter was placed  to suction to allow for drainage. The catheter was withdrawn and  manual pressure applied to the puncture site. A sterile bandage was  applied.     FINDINGS:  The initial ultrasound demonstrates moderate-volume peritoneal  ascites. A total of 2900 mL of clear serous ascitic fluid was removed.        Impression    IMPRESSION:    Successful ultrasound-guided therapeutic paracentesis.    ADRIANA WEINSTEIN MD         SYSTEM ID:  [id number]       Consultations This Hospital Stay   PALLIATIVE CARE ADULT IP CONSULT  HEMATOLOGY & ONCOLOGY IP CONSULT  NUTRITION SERVICES ADULT IP CONSULT  SPIRITUAL HEALTH SERVICES IP CONSULT  SPIRITUAL HEALTH SERVICES IP CONSULT  GASTROENTEROLOGY IP CONSULT  PHYSICAL THERAPY ADULT IP CONSULT  OCCUPATIONAL THERAPY ADULT IP CONSULT  NEPHROLOGY IP CONSULT  PHARMACY TO DOSE [facility]  SPIRITUAL HEALTH SERVICES IP CONSULT  CARE MANAGEMENT / SOCIAL WORK IP CONSULT  SPIRITUAL HEALTH SERVICES IP CONSULT  SPIRITUAL HEALTH SERVICES IP CONSULT    Primary Care Physician   Francine Lombardi    Time Spent on this Encounter   I, Davin Pena MD, discharged this patient today but I did not personally see the patient today and will not be billing for the patient's discharge.

## 2024-01-19 NOTE — PLAN OF CARE
Goal Outcome Evaluation:    Pertinent assessments: Assumed cares [id number]. Patient more restless, progressively agitated. Appears in pain. Confused. Breathing more labored. Unable to swallow pills safely. So, crosscover paged for IV meds. Prn Dilaudid and Valium ordered. Dilaudid given x2 and Valiumx1 with some improvement.  Afbrile, BP soft 94/40s.      At 0430 BP 50s/30s. Patient more lethargic, eyes rolling back. Skin dusky. MD notified. And, daughter Phyllis called and notified on status change. Family on the way to the hospital.

## 2024-01-19 NOTE — PROGRESS NOTES
Cross Cover    Called for increasing agitation and discomfort.  Here with metastatic pancreatic cancer who is now DNR/I and will be discharging on hospice.  On oral prn lorazepam and oxycodone.  Unable to swallow effectively at this time    Ordered prn IV diazepam and hydromorphone overnight   Requested bladder scan be done to ensure urinary retention not the problem

## 2024-01-19 NOTE — CARE PLAN
Patient found not breathing at 0655. And family said she stopped breathing at 0645. Day MD will be notified. Daughter Atia,  and another family are at bedside.

## 2024-01-19 NOTE — PROGRESS NOTES
I was called to pronounce death. Patient stopped breathing at 6:45 am. Family was present. I examined patient and found no pulse, heart sounds, respiratory effort, or response to noxious stimulus. Defer death summary to Dr. Pena.

## 2024-01-20 LAB
BACTERIA FLD CULT: NO GROWTH
GRAM STAIN RESULT: NORMAL
GRAM STAIN RESULT: NORMAL

## 2024-02-13 LAB
BKR LAB AP ADD'L TEST STATUS: NORMAL
BKR PATH ADDL TEST FINAL COMMENTS: NORMAL

## (undated) DEVICE — KIT ENDO TURNOVER/PROCEDURE W/CLEAN A SCOPE LINERS 103888

## (undated) DEVICE — ESU GROUND PAD ADULT W/CORD E7507

## (undated) DEVICE — PACK ERCP CUSTOM RIDGES

## (undated) DEVICE — BAG CLEAR TRASH 1.3M 39X33" P4040C

## (undated) DEVICE — LINEN HALF SHEET 5512

## (undated) DEVICE — SOL WATER IRRIG 1000ML BOTTLE 2F7114

## (undated) DEVICE — SOL NACL 0.9% INJ 250ML BAG 2B1322Q

## (undated) DEVICE — GOWN XLG DISP 9545

## (undated) DEVICE — SUCTION CANISTER MEDIVAC LINER 3000ML W/LID 65651-530

## (undated) DEVICE — DECANTER BAG 2002S

## (undated) DEVICE — KIT PROCEDURE W/CLEAN-A-SCOPE LINERS V2 200800

## (undated) DEVICE — RAD RX ISOVUE 300 (50ML) 61% IOPAMIDOL CHARGE PER ML

## (undated) DEVICE — LINEN FULL SHEET 5511

## (undated) RX ORDER — FENTANYL CITRATE 0.05 MG/ML
INJECTION, SOLUTION INTRAMUSCULAR; INTRAVENOUS
Status: DISPENSED
Start: 2022-04-21

## (undated) RX ORDER — FENTANYL CITRATE 50 UG/ML
INJECTION, SOLUTION INTRAMUSCULAR; INTRAVENOUS
Status: DISPENSED
Start: 2022-06-08